# Patient Record
Sex: FEMALE | Race: WHITE | Employment: OTHER | URBAN - METROPOLITAN AREA
[De-identification: names, ages, dates, MRNs, and addresses within clinical notes are randomized per-mention and may not be internally consistent; named-entity substitution may affect disease eponyms.]

---

## 2019-09-10 ENCOUNTER — HOSPITAL ENCOUNTER (EMERGENCY)
Age: 64
Discharge: HOME | End: 2019-09-10
Attending: INTERNAL MEDICINE
Payer: MEDICARE

## 2019-09-10 ENCOUNTER — APPOINTMENT (OUTPATIENT)
Dept: GENERAL RADIOLOGY | Age: 64
End: 2019-09-10
Payer: MEDICARE

## 2019-09-10 VITALS
WEIGHT: 200 LBS | DIASTOLIC BLOOD PRESSURE: 58 MMHG | TEMPERATURE: 97.8 F | HEART RATE: 81 BPM | SYSTOLIC BLOOD PRESSURE: 126 MMHG | RESPIRATION RATE: 17 BRPM | HEIGHT: 62 IN | OXYGEN SATURATION: 100 % | BODY MASS INDEX: 36.8 KG/M2

## 2019-09-10 DIAGNOSIS — T45.1X5A CHEMOTHERAPY-INDUCED NEUTROPENIA (HCC): ICD-10-CM

## 2019-09-10 DIAGNOSIS — E16.2 HYPOGLYCEMIA: Primary | ICD-10-CM

## 2019-09-10 DIAGNOSIS — D70.1 CHEMOTHERAPY-INDUCED NEUTROPENIA (HCC): ICD-10-CM

## 2019-09-10 LAB
ANION GAP SERPL CALCULATED.3IONS-SCNC: 18 MEQ/L (ref 9–15)
ANISOCYTOSIS: PRESENT
BASOPHILS ABSOLUTE: 0 K/UL (ref 0–0.2)
BASOPHILS RELATIVE PERCENT: 0 %
BUN BLDV-MCNC: 16 MG/DL (ref 8–23)
CALCIUM SERPL-MCNC: 8.8 MG/DL (ref 8.5–9.9)
CHLORIDE BLD-SCNC: 100 MEQ/L (ref 95–107)
CHP ED QC CHECK: YES
CO2: 20 MEQ/L (ref 20–31)
CREAT SERPL-MCNC: 0.96 MG/DL (ref 0.5–0.9)
EKG ATRIAL RATE: 81 BPM
EKG P AXIS: 41 DEGREES
EKG P-R INTERVAL: 162 MS
EKG Q-T INTERVAL: 416 MS
EKG QRS DURATION: 82 MS
EKG QTC CALCULATION (BAZETT): 483 MS
EKG R AXIS: -38 DEGREES
EKG T AXIS: 29 DEGREES
EKG VENTRICULAR RATE: 81 BPM
EOSINOPHILS ABSOLUTE: 0 K/UL (ref 0–0.7)
EOSINOPHILS RELATIVE PERCENT: 2.2 %
GFR AFRICAN AMERICAN: >60
GFR NON-AFRICAN AMERICAN: 58.7
GLUCOSE BLD-MCNC: 112 MG/DL (ref 60–115)
GLUCOSE BLD-MCNC: 116 MG/DL (ref 60–115)
GLUCOSE BLD-MCNC: 160 MG/DL (ref 60–115)
GLUCOSE BLD-MCNC: 194 MG/DL
GLUCOSE BLD-MCNC: 194 MG/DL (ref 60–115)
GLUCOSE BLD-MCNC: 86 MG/DL (ref 70–99)
HCT VFR BLD CALC: 30.9 % (ref 37–47)
HEMOGLOBIN: 10.2 G/DL (ref 12–16)
LYMPHOCYTES ABSOLUTE: 0.7 K/UL (ref 1–4.8)
LYMPHOCYTES RELATIVE PERCENT: 32.2 %
MCH RBC QN AUTO: 28 PG (ref 27–31.3)
MCHC RBC AUTO-ENTMCNC: 33 % (ref 33–37)
MCV RBC AUTO: 84.7 FL (ref 82–100)
MONOCYTES ABSOLUTE: 0.6 K/UL (ref 0.2–0.8)
MONOCYTES RELATIVE PERCENT: 25.7 %
NEUTROPHILS ABSOLUTE: 0.9 K/UL (ref 1.4–6.5)
NEUTROPHILS RELATIVE PERCENT: 39.9 %
PDW BLD-RTO: 22.3 % (ref 11.5–14.5)
PERFORMED ON: ABNORMAL
PERFORMED ON: NORMAL
PLATELET # BLD: 178 K/UL (ref 130–400)
POTASSIUM SERPL-SCNC: 3.8 MEQ/L (ref 3.4–4.9)
RBC # BLD: 3.65 M/UL (ref 4.2–5.4)
SODIUM BLD-SCNC: 138 MEQ/L (ref 135–144)
WBC # BLD: 2.3 K/UL (ref 4.8–10.8)

## 2019-09-10 PROCEDURE — 96360 HYDRATION IV INFUSION INIT: CPT

## 2019-09-10 PROCEDURE — 93005 ELECTROCARDIOGRAM TRACING: CPT

## 2019-09-10 PROCEDURE — 36415 COLL VENOUS BLD VENIPUNCTURE: CPT

## 2019-09-10 PROCEDURE — 80048 BASIC METABOLIC PNL TOTAL CA: CPT

## 2019-09-10 PROCEDURE — 85025 COMPLETE CBC W/AUTO DIFF WBC: CPT

## 2019-09-10 PROCEDURE — 2580000003 HC RX 258: Performed by: INTERNAL MEDICINE

## 2019-09-10 PROCEDURE — 2580000003 HC RX 258: Performed by: EMERGENCY MEDICINE

## 2019-09-10 PROCEDURE — 99285 EMERGENCY DEPT VISIT HI MDM: CPT

## 2019-09-10 RX ORDER — 0.9 % SODIUM CHLORIDE 0.9 %
1000 INTRAVENOUS SOLUTION INTRAVENOUS ONCE
Status: COMPLETED | OUTPATIENT
Start: 2019-09-10 | End: 2019-09-10

## 2019-09-10 RX ORDER — GABAPENTIN 800 MG/1
800 TABLET ORAL 2 TIMES DAILY
COMMUNITY

## 2019-09-10 RX ORDER — DIAZEPAM 10 MG/1
10 TABLET ORAL EVERY 8 HOURS PRN
COMMUNITY

## 2019-09-10 RX ORDER — OMEPRAZOLE 20 MG/1
20 CAPSULE, DELAYED RELEASE ORAL DAILY
COMMUNITY

## 2019-09-10 RX ORDER — 0.9 % SODIUM CHLORIDE 0.9 %
500 INTRAVENOUS SOLUTION INTRAVENOUS ONCE
Status: COMPLETED | OUTPATIENT
Start: 2019-09-10 | End: 2019-09-10

## 2019-09-10 RX ADMIN — SODIUM CHLORIDE 500 ML: 9 INJECTION, SOLUTION INTRAVENOUS at 20:22

## 2019-09-10 RX ADMIN — SODIUM CHLORIDE 1000 ML: 9 INJECTION, SOLUTION INTRAVENOUS at 20:21

## 2019-09-10 SDOH — HEALTH STABILITY: MENTAL HEALTH: HOW OFTEN DO YOU HAVE A DRINK CONTAINING ALCOHOL?: NEVER

## 2019-09-10 NOTE — ED TRIAGE NOTES
Patient was camping and became unresponsive. Patient was found thrashing around in brush. Upon EMS arrival, gluc was 50. One amp D50 was given and patient became more alert, blood sugar up to 130. Gluc 112 upon arrival. Patient currently on chemo for lung ca, last treatment was 3 weeks ago. Patient is taking eliquis daily. A/0 x3, resps even and unlabored on room air. Answering questions appropriately. Reports right knee pain.

## 2019-09-10 NOTE — ED NOTES
Bed: 08  Expected date:   Expected time:   Means of arrival:   Comments:  REJI   63 YO F bill Wooten RN  09/10/19 [id number]

## 2019-09-11 PROCEDURE — 93010 ELECTROCARDIOGRAM REPORT: CPT | Performed by: INTERNAL MEDICINE

## 2019-09-11 NOTE — ED NOTES
Patient refuses right knee xray at this time. Patient states \"It's not necessary\".       Marti Ren RN  09/10/19 2126

## 2019-09-12 NOTE — ED PROVIDER NOTES
[facility] ED  EMERGENCY DEPARTMENT ENCOUNTER      Pt Name: Neno Ceja  MRN: [id number]  [place] 1955  Date of evaluation: 9/10/2019  Provider: Jenny Fernandez MD    CHIEF COMPLAINT       Chief Complaint   Patient presents with    Blood Sugar Problem         HISTORY OF PRESENT ILLNESS   (Location/Symptom, Timing/Onset, Context/Setting, Quality, Duration, Modifying Factors, Severity)  Note limiting factors. Neno Ceja is a 62 y.o. female who has a history of lung cancer undergoing chemo therapy, presents to the emergency department for evaluation and management of unresponsiveness and hypoglycemic episode while camping at [facility]. She was found thrashing in the brush. Her blood sugar dropped to 50. When EMS arrived they gave her D50. She stated that earlier today she had a cheeseburger and 2 coffees. Currently her blood sugar is 112. She sees an oncologist at the [facility] by the name of Dr. Rodo Finley. Her last chemotherapy treatment was 3 weeks ago. REVIEW OF SYSTEMS    Constitutional: Negative for fatigue and fever. Respiratory: Negative for cough, chest tightness and shortness of breath. Cardiovascular: Negative for chest pain, palpitations and leg swelling. Gastrointestinal: Negative for abdominal distention, abdominal pain, diarrhea, nausea and vomiting. Endocrine: Positive for hypoglycemia, negative for cold intolerance, heat intolerance, polydipsia, polyphagia and polyuria. Genitourinary: Negative for difficulty urinating, dysuria, hematuria and urgency. Musculoskeletal: Negative for arthralgias, back pain and neck pain. Skin: Negative for color change, pallor, rash and wound. Neurological: Positive for weakness, mental status change, negative for dizziness, speech difficulty, numbness and headaches. All other systems reviewed and are negative.     Except as noted above theremainder of the review of systems was reviewed and
Yosef Mckeon MD           Summation    Patient care transferred to Dr. Bowen Rose. New Prescriptions from this visit:    Discharge Medication List as of 9/10/2019  8:18 PM          Follow-up:  Usha Drummond, [place] [id number]    In 2 days      See your oncologist tomorrow the next day               (Please note that portions of this note werecompleted with a voice recognition program.  Efforts were made to edit the dictations but occasionally words are mis-transcribed.)    FINAL IMPRESSION      1. Hypoglycemia Stable   2.  Chemotherapy-induced neutropenia [facility])          DISPOSITION/PLAN   DISPOSITION Decision To Discharge 09/10/2019 08:17:21 PM      PATIENT REFERRED TO:  Usha Drummond MD  [address] [place] [address] [phone]    In 2 days      See your oncologist tomorrow the next day          Yosef Mckeon MD (electronically signed)  Attending Emergency Physician      ** Mary Lou Villegas MD  09/12/19 130 Tamy Carpio MD  09/12/19 0408       Yosef Mckeon MD  09/12/19 130 Tamy Carpio MD  09/12/19 0410       Yosef Mckeon MD  09/12/19 0553       Yosef Mckeon MD  09/12/19 1069       Yosef Mckeon MD  09/12/19 1936       Yosef Mckeon MD  09/12/19 130 Tamy Carpio MD  09/12/19 9256

## 2025-02-21 ENCOUNTER — APPOINTMENT (OUTPATIENT)
Dept: RADIOLOGY | Facility: HOSPITAL | Age: 70
End: 2025-02-21
Payer: MEDICARE

## 2025-02-21 ENCOUNTER — APPOINTMENT (OUTPATIENT)
Dept: CARDIOLOGY | Facility: HOSPITAL | Age: 70
End: 2025-02-21
Payer: MEDICARE

## 2025-02-21 ENCOUNTER — HOSPITAL ENCOUNTER (INPATIENT)
Facility: HOSPITAL | Age: 70
End: 2025-02-21
Attending: EMERGENCY MEDICINE | Admitting: INTERNAL MEDICINE
Payer: MEDICARE

## 2025-02-21 DIAGNOSIS — R41.82 ALTERED MENTAL STATUS, UNSPECIFIED ALTERED MENTAL STATUS TYPE: ICD-10-CM

## 2025-02-21 DIAGNOSIS — R65.20 SEPSIS WITH ENCEPHALOPATHY WITHOUT SEPTIC SHOCK, DUE TO UNSPECIFIED ORGANISM (MULTI): Primary | ICD-10-CM

## 2025-02-21 DIAGNOSIS — A41.9 SEPSIS WITH ENCEPHALOPATHY WITHOUT SEPTIC SHOCK, DUE TO UNSPECIFIED ORGANISM (MULTI): Primary | ICD-10-CM

## 2025-02-21 DIAGNOSIS — N30.00 ACUTE CYSTITIS WITHOUT HEMATURIA: ICD-10-CM

## 2025-02-21 DIAGNOSIS — I72.8 ANEURYSM OF OTHER SPECIFIED ARTERIES: ICD-10-CM

## 2025-02-21 DIAGNOSIS — G93.41 SEPSIS WITH ENCEPHALOPATHY WITHOUT SEPTIC SHOCK, DUE TO UNSPECIFIED ORGANISM (MULTI): Primary | ICD-10-CM

## 2025-02-21 DIAGNOSIS — R09.89 OTHER SPECIFIED SYMPTOMS AND SIGNS INVOLVING THE CIRCULATORY AND RESPIRATORY SYSTEMS: ICD-10-CM

## 2025-02-21 DIAGNOSIS — I48.91 ATRIAL FIBRILLATION, UNSPECIFIED TYPE (MULTI): ICD-10-CM

## 2025-02-21 DIAGNOSIS — Z51.5 HOSPICE CARE: ICD-10-CM

## 2025-02-21 DIAGNOSIS — I47.19 ATRIAL TACHYCARDIA (CMS-HCC): ICD-10-CM

## 2025-02-21 PROBLEM — J10.1 INFLUENZA A: Status: ACTIVE | Noted: 2025-02-21

## 2025-02-21 PROBLEM — I50.30 (HFPEF) HEART FAILURE WITH PRESERVED EJECTION FRACTION: Status: ACTIVE | Noted: 2025-02-21

## 2025-02-21 PROBLEM — I27.20 PULMONARY HTN (MULTI): Status: ACTIVE | Noted: 2025-02-21

## 2025-02-21 PROBLEM — R79.89 ELEVATED TROPONIN I LEVEL: Status: ACTIVE | Noted: 2025-02-21

## 2025-02-21 PROBLEM — I35.0 AS (AORTIC STENOSIS): Status: ACTIVE | Noted: 2025-02-21

## 2025-02-21 PROBLEM — J96.01 ACUTE HYPOXIC RESPIRATORY FAILURE (MULTI): Status: ACTIVE | Noted: 2025-02-21

## 2025-02-21 PROBLEM — Z87.09 HISTORY OF COPD: Status: ACTIVE | Noted: 2025-02-21

## 2025-02-21 PROBLEM — N39.0 UTI (URINARY TRACT INFECTION): Status: ACTIVE | Noted: 2025-02-21

## 2025-02-21 LAB
ALBUMIN SERPL BCP-MCNC: 3.5 G/DL (ref 3.4–5)
ALP SERPL-CCNC: 97 U/L (ref 33–136)
ALT SERPL W P-5'-P-CCNC: 16 U/L (ref 7–45)
ANION GAP BLDV CALCULATED.4IONS-SCNC: 6 MMOL/L (ref 10–25)
ANION GAP BLDV CALCULATED.4IONS-SCNC: 6 MMOL/L (ref 10–25)
ANION GAP SERPL CALC-SCNC: 15 MMOL/L (ref 10–20)
APPEARANCE UR: ABNORMAL
AST SERPL W P-5'-P-CCNC: 17 U/L (ref 9–39)
BACTERIA #/AREA URNS AUTO: ABNORMAL /HPF
BASE EXCESS BLDV CALC-SCNC: 5.5 MMOL/L (ref -2–3)
BASE EXCESS BLDV CALC-SCNC: 7.4 MMOL/L (ref -2–3)
BASOPHILS # BLD AUTO: 0.04 X10*3/UL (ref 0–0.1)
BASOPHILS NFR BLD AUTO: 0.2 %
BILIRUB SERPL-MCNC: 0.8 MG/DL (ref 0–1.2)
BILIRUB UR STRIP.AUTO-MCNC: NEGATIVE MG/DL
BNP SERPL-MCNC: 337 PG/ML (ref 0–99)
BODY TEMPERATURE: ABNORMAL
BODY TEMPERATURE: ABNORMAL
BUN SERPL-MCNC: 27 MG/DL (ref 6–23)
CA-I BLDV-SCNC: 1.38 MMOL/L (ref 1.1–1.33)
CA-I BLDV-SCNC: 1.43 MMOL/L (ref 1.1–1.33)
CALCIUM SERPL-MCNC: 10.8 MG/DL (ref 8.6–10.3)
CARDIAC TROPONIN I PNL SERPL HS: 43 NG/L (ref 0–13)
CARDIAC TROPONIN I PNL SERPL HS: 43 NG/L (ref 0–13)
CARDIAC TROPONIN I PNL SERPL HS: 46 NG/L (ref 0–13)
CHLORIDE BLDV-SCNC: 102 MMOL/L (ref 98–107)
CHLORIDE BLDV-SCNC: 103 MMOL/L (ref 98–107)
CHLORIDE SERPL-SCNC: 98 MMOL/L (ref 98–107)
CO2 SERPL-SCNC: 27 MMOL/L (ref 21–32)
COLOR UR: ABNORMAL
CREAT SERPL-MCNC: 1.36 MG/DL (ref 0.5–1.05)
EGFRCR SERPLBLD CKD-EPI 2021: 43 ML/MIN/1.73M*2
EOSINOPHIL # BLD AUTO: 0 X10*3/UL (ref 0–0.7)
EOSINOPHIL NFR BLD AUTO: 0 %
ERYTHROCYTE [DISTWIDTH] IN BLOOD BY AUTOMATED COUNT: 18.2 % (ref 11.5–14.5)
FLUAV RNA RESP QL NAA+PROBE: DETECTED
FLUBV RNA RESP QL NAA+PROBE: NOT DETECTED
GLUCOSE BLD MANUAL STRIP-MCNC: 288 MG/DL (ref 74–99)
GLUCOSE BLD MANUAL STRIP-MCNC: 340 MG/DL (ref 74–99)
GLUCOSE BLDV-MCNC: 318 MG/DL (ref 74–99)
GLUCOSE BLDV-MCNC: 349 MG/DL (ref 74–99)
GLUCOSE SERPL-MCNC: 290 MG/DL (ref 74–99)
GLUCOSE UR STRIP.AUTO-MCNC: NORMAL MG/DL
HCO3 BLDV-SCNC: 29.9 MMOL/L (ref 22–26)
HCO3 BLDV-SCNC: 32 MMOL/L (ref 22–26)
HCT VFR BLD AUTO: 39.5 % (ref 36–46)
HCT VFR BLD EST: 35 % (ref 36–46)
HCT VFR BLD EST: 37 % (ref 36–46)
HGB BLD-MCNC: 12 G/DL (ref 12–16)
HGB BLDV-MCNC: 11.7 G/DL (ref 12–16)
HGB BLDV-MCNC: 12.3 G/DL (ref 12–16)
HOLD SPECIMEN: NORMAL
IMM GRANULOCYTES # BLD AUTO: 0.09 X10*3/UL (ref 0–0.7)
IMM GRANULOCYTES NFR BLD AUTO: 0.5 % (ref 0–0.9)
INHALED O2 CONCENTRATION: 21 %
INHALED O2 CONCENTRATION: 21 %
KETONES UR STRIP.AUTO-MCNC: NEGATIVE MG/DL
LACTATE BLDV-SCNC: 1.3 MMOL/L (ref 0.4–2)
LACTATE BLDV-SCNC: 1.4 MMOL/L (ref 0.4–2)
LACTATE SERPL-SCNC: 1.2 MMOL/L (ref 0.4–2)
LEUKOCYTE ESTERASE UR QL STRIP.AUTO: ABNORMAL
LYMPHOCYTES # BLD AUTO: 0.71 X10*3/UL (ref 1.2–4.8)
LYMPHOCYTES NFR BLD AUTO: 4.2 %
MCH RBC QN AUTO: 26.3 PG (ref 26–34)
MCHC RBC AUTO-ENTMCNC: 30.4 G/DL (ref 32–36)
MCV RBC AUTO: 86 FL (ref 80–100)
MONOCYTES # BLD AUTO: 1.2 X10*3/UL (ref 0.1–1)
MONOCYTES NFR BLD AUTO: 7 %
MUCOUS THREADS #/AREA URNS AUTO: ABNORMAL /LPF
NEUTROPHILS # BLD AUTO: 15 X10*3/UL (ref 1.2–7.7)
NEUTROPHILS NFR BLD AUTO: 88.1 %
NITRITE UR QL STRIP.AUTO: NEGATIVE
NRBC BLD-RTO: 0 /100 WBCS (ref 0–0)
OXYHGB MFR BLDV: 88.6 % (ref 45–75)
OXYHGB MFR BLDV: 93.5 % (ref 45–75)
PCO2 BLDV: 42 MM HG (ref 41–51)
PCO2 BLDV: 44 MM HG (ref 41–51)
PH BLDV: 7.46 PH (ref 7.33–7.43)
PH BLDV: 7.47 PH (ref 7.33–7.43)
PH UR STRIP.AUTO: 6.5 [PH]
PLATELET # BLD AUTO: 225 X10*3/UL (ref 150–450)
PO2 BLDV: 63 MM HG (ref 35–45)
PO2 BLDV: 74 MM HG (ref 35–45)
POTASSIUM BLDV-SCNC: 4.8 MMOL/L (ref 3.5–5.3)
POTASSIUM BLDV-SCNC: 5.3 MMOL/L (ref 3.5–5.3)
POTASSIUM SERPL-SCNC: 4.3 MMOL/L (ref 3.5–5.3)
PROT SERPL-MCNC: 7.4 G/DL (ref 6.4–8.2)
PROT UR STRIP.AUTO-MCNC: ABNORMAL MG/DL
RBC # BLD AUTO: 4.57 X10*6/UL (ref 4–5.2)
RBC # UR STRIP.AUTO: ABNORMAL MG/DL
RBC #/AREA URNS AUTO: >20 /HPF
SAO2 % BLDV: 91 % (ref 45–75)
SAO2 % BLDV: 96 % (ref 45–75)
SARS-COV-2 RNA RESP QL NAA+PROBE: NOT DETECTED
SODIUM BLDV-SCNC: 134 MMOL/L (ref 136–145)
SODIUM BLDV-SCNC: 135 MMOL/L (ref 136–145)
SODIUM SERPL-SCNC: 136 MMOL/L (ref 136–145)
SP GR UR STRIP.AUTO: 1.02
SQUAMOUS #/AREA URNS AUTO: ABNORMAL /HPF
UROBILINOGEN UR STRIP.AUTO-MCNC: ABNORMAL MG/DL
WBC # BLD AUTO: 17 X10*3/UL (ref 4.4–11.3)
WBC #/AREA URNS AUTO: >50 /HPF
WBC CLUMPS #/AREA URNS AUTO: ABNORMAL /HPF
YEAST BUDDING #/AREA UR COMP ASSIST: PRESENT /HPF

## 2025-02-21 PROCEDURE — 84484 ASSAY OF TROPONIN QUANT: CPT | Performed by: EMERGENCY MEDICINE

## 2025-02-21 PROCEDURE — 87899 AGENT NOS ASSAY W/OPTIC: CPT | Mod: STJLAB | Performed by: PHYSICIAN ASSISTANT

## 2025-02-21 PROCEDURE — 87636 SARSCOV2 & INF A&B AMP PRB: CPT | Performed by: NURSE PRACTITIONER

## 2025-02-21 PROCEDURE — 36415 COLL VENOUS BLD VENIPUNCTURE: CPT | Performed by: EMERGENCY MEDICINE

## 2025-02-21 PROCEDURE — 82947 ASSAY GLUCOSE BLOOD QUANT: CPT

## 2025-02-21 PROCEDURE — 81001 URINALYSIS AUTO W/SCOPE: CPT | Performed by: EMERGENCY MEDICINE

## 2025-02-21 PROCEDURE — 83880 ASSAY OF NATRIURETIC PEPTIDE: CPT | Performed by: EMERGENCY MEDICINE

## 2025-02-21 PROCEDURE — 2060000001 HC INTERMEDIATE ICU ROOM DAILY

## 2025-02-21 PROCEDURE — 99291 CRITICAL CARE FIRST HOUR: CPT | Performed by: EMERGENCY MEDICINE

## 2025-02-21 PROCEDURE — 2500000005 HC RX 250 GENERAL PHARMACY W/O HCPCS: Performed by: PHYSICIAN ASSISTANT

## 2025-02-21 PROCEDURE — 96365 THER/PROPH/DIAG IV INF INIT: CPT | Mod: 59

## 2025-02-21 PROCEDURE — 2500000001 HC RX 250 WO HCPCS SELF ADMINISTERED DRUGS (ALT 637 FOR MEDICARE OP): Performed by: EMERGENCY MEDICINE

## 2025-02-21 PROCEDURE — 36415 COLL VENOUS BLD VENIPUNCTURE: CPT | Performed by: PHYSICIAN ASSISTANT

## 2025-02-21 PROCEDURE — 87086 URINE CULTURE/COLONY COUNT: CPT | Mod: STJLAB | Performed by: EMERGENCY MEDICINE

## 2025-02-21 PROCEDURE — 2500000002 HC RX 250 W HCPCS SELF ADMINISTERED DRUGS (ALT 637 FOR MEDICARE OP, ALT 636 FOR OP/ED): Performed by: EMERGENCY MEDICINE

## 2025-02-21 PROCEDURE — 96367 TX/PROPH/DG ADDL SEQ IV INF: CPT

## 2025-02-21 PROCEDURE — 84132 ASSAY OF SERUM POTASSIUM: CPT | Performed by: EMERGENCY MEDICINE

## 2025-02-21 PROCEDURE — 99285 EMERGENCY DEPT VISIT HI MDM: CPT | Mod: 25 | Performed by: EMERGENCY MEDICINE

## 2025-02-21 PROCEDURE — 83605 ASSAY OF LACTIC ACID: CPT | Performed by: EMERGENCY MEDICINE

## 2025-02-21 PROCEDURE — 99222 1ST HOSP IP/OBS MODERATE 55: CPT | Performed by: PHYSICIAN ASSISTANT

## 2025-02-21 PROCEDURE — 71045 X-RAY EXAM CHEST 1 VIEW: CPT

## 2025-02-21 PROCEDURE — 70450 CT HEAD/BRAIN W/O DYE: CPT | Performed by: STUDENT IN AN ORGANIZED HEALTH CARE EDUCATION/TRAINING PROGRAM

## 2025-02-21 PROCEDURE — 87449 NOS EACH ORGANISM AG IA: CPT | Mod: STJLAB | Performed by: PHYSICIAN ASSISTANT

## 2025-02-21 PROCEDURE — 84145 PROCALCITONIN (PCT): CPT | Mod: STJLAB | Performed by: PHYSICIAN ASSISTANT

## 2025-02-21 PROCEDURE — 96360 HYDRATION IV INFUSION INIT: CPT

## 2025-02-21 PROCEDURE — 96361 HYDRATE IV INFUSION ADD-ON: CPT

## 2025-02-21 PROCEDURE — 71045 X-RAY EXAM CHEST 1 VIEW: CPT | Performed by: RADIOLOGY

## 2025-02-21 PROCEDURE — 93005 ELECTROCARDIOGRAM TRACING: CPT

## 2025-02-21 PROCEDURE — 94640 AIRWAY INHALATION TREATMENT: CPT

## 2025-02-21 PROCEDURE — 84484 ASSAY OF TROPONIN QUANT: CPT | Performed by: PHYSICIAN ASSISTANT

## 2025-02-21 PROCEDURE — 84295 ASSAY OF SERUM SODIUM: CPT | Performed by: EMERGENCY MEDICINE

## 2025-02-21 PROCEDURE — 2500000004 HC RX 250 GENERAL PHARMACY W/ HCPCS (ALT 636 FOR OP/ED): Performed by: PHYSICIAN ASSISTANT

## 2025-02-21 PROCEDURE — 70450 CT HEAD/BRAIN W/O DYE: CPT

## 2025-02-21 PROCEDURE — 85025 COMPLETE CBC W/AUTO DIFF WBC: CPT | Performed by: EMERGENCY MEDICINE

## 2025-02-21 PROCEDURE — 87040 BLOOD CULTURE FOR BACTERIA: CPT | Mod: STJLAB | Performed by: EMERGENCY MEDICINE

## 2025-02-21 PROCEDURE — 2500000002 HC RX 250 W HCPCS SELF ADMINISTERED DRUGS (ALT 637 FOR MEDICARE OP, ALT 636 FOR OP/ED): Performed by: PHYSICIAN ASSISTANT

## 2025-02-21 PROCEDURE — 2500000005 HC RX 250 GENERAL PHARMACY W/O HCPCS: Performed by: EMERGENCY MEDICINE

## 2025-02-21 PROCEDURE — 2500000004 HC RX 250 GENERAL PHARMACY W/ HCPCS (ALT 636 FOR OP/ED): Performed by: EMERGENCY MEDICINE

## 2025-02-21 RX ORDER — ALUMINUM HYDROXIDE, MAGNESIUM HYDROXIDE, AND SIMETHICONE 1200; 120; 1200 MG/30ML; MG/30ML; MG/30ML
30 SUSPENSION ORAL EVERY 6 HOURS PRN
Status: DISCONTINUED | OUTPATIENT
Start: 2025-02-21 | End: 2025-02-27

## 2025-02-21 RX ORDER — ACETAMINOPHEN 325 MG/1
650 TABLET ORAL EVERY 4 HOURS PRN
Status: DISCONTINUED | OUTPATIENT
Start: 2025-02-21 | End: 2025-02-27

## 2025-02-21 RX ORDER — DEXTROMETHORPHAN HYDROBROMIDE, GUAIFENESIN 5; 100 MG/5ML; MG/5ML
650 LIQUID ORAL EVERY 6 HOURS PRN
COMMUNITY
End: 2025-02-27 | Stop reason: HOSPADM

## 2025-02-21 RX ORDER — NYSTATIN 100000 [USP'U]/ML
5 SUSPENSION ORAL 4 TIMES DAILY
Status: DISCONTINUED | OUTPATIENT
Start: 2025-02-21 | End: 2025-02-27 | Stop reason: HOSPADM

## 2025-02-21 RX ORDER — POTASSIUM CHLORIDE 750 MG/1
10 TABLET, FILM COATED, EXTENDED RELEASE ORAL DAILY
Status: DISCONTINUED | OUTPATIENT
Start: 2025-02-22 | End: 2025-02-27

## 2025-02-21 RX ORDER — BISACODYL 10 MG/1
10 SUPPOSITORY RECTAL DAILY PRN
COMMUNITY

## 2025-02-21 RX ORDER — INSULIN LISPRO 100 [IU]/ML
0-10 INJECTION, SOLUTION INTRAVENOUS; SUBCUTANEOUS
Status: DISCONTINUED | OUTPATIENT
Start: 2025-02-21 | End: 2025-02-21

## 2025-02-21 RX ORDER — BISACODYL 10 MG/1
10 SUPPOSITORY RECTAL DAILY PRN
Status: DISCONTINUED | OUTPATIENT
Start: 2025-02-21 | End: 2025-02-27 | Stop reason: HOSPADM

## 2025-02-21 RX ORDER — IPRATROPIUM BROMIDE AND ALBUTEROL SULFATE 2.5; .5 MG/3ML; MG/3ML
3 SOLUTION RESPIRATORY (INHALATION)
Status: DISCONTINUED | OUTPATIENT
Start: 2025-02-22 | End: 2025-02-27

## 2025-02-21 RX ORDER — ACETAMINOPHEN 650 MG/1
650 SUPPOSITORY RECTAL EVERY 4 HOURS PRN
Status: DISCONTINUED | OUTPATIENT
Start: 2025-02-21 | End: 2025-02-27

## 2025-02-21 RX ORDER — IPRATROPIUM BROMIDE AND ALBUTEROL SULFATE 2.5; .5 MG/3ML; MG/3ML
3 SOLUTION RESPIRATORY (INHALATION)
Status: DISCONTINUED | OUTPATIENT
Start: 2025-02-21 | End: 2025-02-21

## 2025-02-21 RX ORDER — ADHESIVE BANDAGE
30 BANDAGE TOPICAL DAILY PRN
COMMUNITY
End: 2025-02-27 | Stop reason: HOSPADM

## 2025-02-21 RX ORDER — DIAZEPAM 10 MG/1
10 TABLET ORAL EVERY 8 HOURS PRN
COMMUNITY
End: 2025-02-27 | Stop reason: HOSPADM

## 2025-02-21 RX ORDER — DEXTROSE 50 % IN WATER (D50W) INTRAVENOUS SYRINGE
25
Status: DISCONTINUED | OUTPATIENT
Start: 2025-02-21 | End: 2025-02-27

## 2025-02-21 RX ORDER — IPRATROPIUM BROMIDE 42 UG/1
2 SPRAY, METERED NASAL EVERY 6 HOURS PRN
Status: DISCONTINUED | OUTPATIENT
Start: 2025-02-21 | End: 2025-02-27

## 2025-02-21 RX ORDER — NITROGLYCERIN 0.4 MG/1
0.4 TABLET SUBLINGUAL EVERY 5 MIN PRN
COMMUNITY
End: 2025-02-27 | Stop reason: HOSPADM

## 2025-02-21 RX ORDER — ASPIRIN 81 MG/1
81 TABLET ORAL DAILY
Status: DISCONTINUED | OUTPATIENT
Start: 2025-02-22 | End: 2025-02-27

## 2025-02-21 RX ORDER — ADHESIVE BANDAGE
30 BANDAGE TOPICAL DAILY PRN
Status: DISCONTINUED | OUTPATIENT
Start: 2025-02-21 | End: 2025-02-27

## 2025-02-21 RX ORDER — IPRATROPIUM BROMIDE 42 UG/1
2 SPRAY, METERED NASAL EVERY 6 HOURS PRN
COMMUNITY
End: 2025-02-27 | Stop reason: HOSPADM

## 2025-02-21 RX ORDER — ATORVASTATIN CALCIUM 80 MG/1
80 TABLET, FILM COATED ORAL NIGHTLY
COMMUNITY
End: 2025-02-27 | Stop reason: HOSPADM

## 2025-02-21 RX ORDER — LOPERAMIDE HYDROCHLORIDE 2 MG/1
2 CAPSULE ORAL 4 TIMES DAILY PRN
COMMUNITY
End: 2025-02-27 | Stop reason: HOSPADM

## 2025-02-21 RX ORDER — FUROSEMIDE 10 MG/ML
20 INJECTION INTRAMUSCULAR; INTRAVENOUS ONCE
Status: DISCONTINUED | OUTPATIENT
Start: 2025-02-21 | End: 2025-02-21

## 2025-02-21 RX ORDER — ONDANSETRON HYDROCHLORIDE 2 MG/ML
4 INJECTION, SOLUTION INTRAVENOUS EVERY 8 HOURS PRN
Status: DISCONTINUED | OUTPATIENT
Start: 2025-02-21 | End: 2025-02-27

## 2025-02-21 RX ORDER — CEFTRIAXONE 1 G/50ML
1 INJECTION, SOLUTION INTRAVENOUS EVERY 24 HOURS
Status: DISCONTINUED | OUTPATIENT
Start: 2025-02-22 | End: 2025-02-22

## 2025-02-21 RX ORDER — POLYETHYLENE GLYCOL 3350 17 G/17G
17 POWDER, FOR SOLUTION ORAL DAILY PRN
Status: DISCONTINUED | OUTPATIENT
Start: 2025-02-21 | End: 2025-02-27

## 2025-02-21 RX ORDER — OSELTAMIVIR PHOSPHATE 30 MG/1
30 CAPSULE ORAL 2 TIMES DAILY
Status: DISCONTINUED | OUTPATIENT
Start: 2025-02-22 | End: 2025-02-26

## 2025-02-21 RX ORDER — POLYETHYLENE GLYCOL 3350 17 G/17G
17 POWDER, FOR SOLUTION ORAL DAILY PRN
COMMUNITY
End: 2025-02-27 | Stop reason: HOSPADM

## 2025-02-21 RX ORDER — POTASSIUM CHLORIDE 750 MG/1
10 TABLET, FILM COATED, EXTENDED RELEASE ORAL DAILY
COMMUNITY
End: 2025-02-27 | Stop reason: HOSPADM

## 2025-02-21 RX ORDER — BACLOFEN 5 MG/1
5 TABLET ORAL 2 TIMES DAILY
Status: DISCONTINUED | OUTPATIENT
Start: 2025-02-21 | End: 2025-02-26

## 2025-02-21 RX ORDER — ATORVASTATIN CALCIUM 80 MG/1
80 TABLET, FILM COATED ORAL NIGHTLY
Status: DISCONTINUED | OUTPATIENT
Start: 2025-02-21 | End: 2025-02-27

## 2025-02-21 RX ORDER — DIAZEPAM 5 MG/1
10 TABLET ORAL EVERY 8 HOURS PRN
Status: DISCONTINUED | OUTPATIENT
Start: 2025-02-21 | End: 2025-02-27

## 2025-02-21 RX ORDER — BACLOFEN 5 MG/1
5 TABLET ORAL 2 TIMES DAILY
COMMUNITY
End: 2025-02-27 | Stop reason: HOSPADM

## 2025-02-21 RX ORDER — PANTOPRAZOLE SODIUM 40 MG/1
40 TABLET, DELAYED RELEASE ORAL
Status: DISCONTINUED | OUTPATIENT
Start: 2025-02-22 | End: 2025-02-26

## 2025-02-21 RX ORDER — UMECLIDINIUM BROMIDE AND VILANTEROL TRIFENATATE 62.5; 25 UG/1; UG/1
1 POWDER RESPIRATORY (INHALATION) DAILY
COMMUNITY
End: 2025-02-27 | Stop reason: HOSPADM

## 2025-02-21 RX ORDER — DEXTROSE 50 % IN WATER (D50W) INTRAVENOUS SYRINGE
12.5
Status: DISCONTINUED | OUTPATIENT
Start: 2025-02-21 | End: 2025-02-27

## 2025-02-21 RX ORDER — LURASIDONE HYDROCHLORIDE 40 MG/1
120 TABLET, FILM COATED ORAL
Status: DISCONTINUED | OUTPATIENT
Start: 2025-02-22 | End: 2025-02-27

## 2025-02-21 RX ORDER — PANTOPRAZOLE SODIUM 40 MG/1
40 TABLET, DELAYED RELEASE ORAL
COMMUNITY
End: 2025-02-27 | Stop reason: HOSPADM

## 2025-02-21 RX ORDER — ALBUTEROL SULFATE 0.83 MG/ML
2.5 SOLUTION RESPIRATORY (INHALATION) EVERY 2 HOUR PRN
Status: DISCONTINUED | OUTPATIENT
Start: 2025-02-21 | End: 2025-02-27 | Stop reason: HOSPADM

## 2025-02-21 RX ORDER — CHOLECALCIFEROL (VITAMIN D3) 25 MCG
2000 TABLET ORAL DAILY
Status: DISCONTINUED | OUTPATIENT
Start: 2025-02-22 | End: 2025-02-27

## 2025-02-21 RX ORDER — ALUMINUM HYDROXIDE, MAGNESIUM HYDROXIDE, AND SIMETHICONE 1200; 120; 1200 MG/30ML; MG/30ML; MG/30ML
30 SUSPENSION ORAL EVERY 6 HOURS PRN
COMMUNITY
End: 2025-02-27 | Stop reason: HOSPADM

## 2025-02-21 RX ORDER — GABAPENTIN 100 MG/1
100 CAPSULE ORAL 2 TIMES DAILY
Status: DISCONTINUED | OUTPATIENT
Start: 2025-02-21 | End: 2025-02-26

## 2025-02-21 RX ORDER — ONDANSETRON 4 MG/1
4 TABLET, FILM COATED ORAL EVERY 6 HOURS PRN
COMMUNITY

## 2025-02-21 RX ORDER — TALC
3 POWDER (GRAM) TOPICAL NIGHTLY PRN
Status: DISCONTINUED | OUTPATIENT
Start: 2025-02-21 | End: 2025-02-27

## 2025-02-21 RX ORDER — CHOLECALCIFEROL (VITAMIN D3) 50 MCG
50 TABLET ORAL DAILY
COMMUNITY
End: 2025-02-27 | Stop reason: HOSPADM

## 2025-02-21 RX ORDER — ACETAMINOPHEN 650 MG/1
650 SUPPOSITORY RECTAL ONCE
Status: COMPLETED | OUTPATIENT
Start: 2025-02-21 | End: 2025-02-21

## 2025-02-21 RX ORDER — FUROSEMIDE 20 MG/1
20 TABLET ORAL DAILY
COMMUNITY
End: 2025-02-27 | Stop reason: HOSPADM

## 2025-02-21 RX ORDER — LURASIDONE HYDROCHLORIDE 120 MG/1
120 TABLET, FILM COATED ORAL
COMMUNITY
End: 2025-02-27 | Stop reason: HOSPADM

## 2025-02-21 RX ORDER — FUROSEMIDE 20 MG/1
20 TABLET ORAL DAILY
Status: DISCONTINUED | OUTPATIENT
Start: 2025-02-22 | End: 2025-02-27

## 2025-02-21 RX ORDER — INSULIN LISPRO 100 [IU]/ML
0-10 INJECTION, SOLUTION INTRAVENOUS; SUBCUTANEOUS EVERY 4 HOURS
Status: DISCONTINUED | OUTPATIENT
Start: 2025-02-21 | End: 2025-02-27

## 2025-02-21 RX ORDER — SODIUM CHLORIDE 9 MG/ML
75 INJECTION, SOLUTION INTRAVENOUS CONTINUOUS
Status: ACTIVE | OUTPATIENT
Start: 2025-02-21 | End: 2025-02-22

## 2025-02-21 RX ORDER — GABAPENTIN 100 MG/1
100 CAPSULE ORAL 2 TIMES DAILY
COMMUNITY
End: 2025-02-27 | Stop reason: HOSPADM

## 2025-02-21 RX ORDER — GLIMEPIRIDE 4 MG/1
4 TABLET ORAL
COMMUNITY
End: 2025-02-27 | Stop reason: HOSPADM

## 2025-02-21 RX ORDER — PANTOPRAZOLE SODIUM 40 MG/10ML
40 INJECTION, POWDER, LYOPHILIZED, FOR SOLUTION INTRAVENOUS
Status: DISCONTINUED | OUTPATIENT
Start: 2025-02-22 | End: 2025-02-26

## 2025-02-21 RX ORDER — HEPARIN SODIUM 5000 [USP'U]/ML
5000 INJECTION, SOLUTION INTRAVENOUS; SUBCUTANEOUS EVERY 8 HOURS
Status: DISCONTINUED | OUTPATIENT
Start: 2025-02-21 | End: 2025-02-24

## 2025-02-21 RX ORDER — OSELTAMIVIR PHOSPHATE 75 MG/1
75 CAPSULE ORAL ONCE
Status: COMPLETED | OUTPATIENT
Start: 2025-02-21 | End: 2025-02-21

## 2025-02-21 RX ORDER — ALBUTEROL SULFATE 0.83 MG/ML
2.5 SOLUTION RESPIRATORY (INHALATION) EVERY 6 HOURS PRN
COMMUNITY

## 2025-02-21 RX ORDER — ONDANSETRON 4 MG/1
4 TABLET, ORALLY DISINTEGRATING ORAL EVERY 8 HOURS PRN
Status: DISCONTINUED | OUTPATIENT
Start: 2025-02-21 | End: 2025-02-27 | Stop reason: HOSPADM

## 2025-02-21 RX ORDER — ASPIRIN 81 MG/1
81 TABLET ORAL DAILY
COMMUNITY
End: 2025-02-27 | Stop reason: HOSPADM

## 2025-02-21 RX ADMIN — POLYVINYL ALCOHOL, POVIDONE 1 DROP: 14; 6 SOLUTION/ DROPS OPHTHALMIC at 20:43

## 2025-02-21 RX ADMIN — INSULIN LISPRO 6 UNITS: 100 INJECTION, SOLUTION INTRAVENOUS; SUBCUTANEOUS at 20:43

## 2025-02-21 RX ADMIN — Medication 3 L/MIN: at 12:26

## 2025-02-21 RX ADMIN — OSELTAMAVIR PHOSPHATE 75 MG: 75 CAPSULE ORAL at 15:32

## 2025-02-21 RX ADMIN — AZITHROMYCIN MONOHYDRATE 500 MG: 500 INJECTION, POWDER, LYOPHILIZED, FOR SOLUTION INTRAVENOUS at 12:24

## 2025-02-21 RX ADMIN — ACETAMINOPHEN 650 MG: 650 SUPPOSITORY RECTAL at 12:36

## 2025-02-21 RX ADMIN — HEPARIN SODIUM 5000 UNITS: 5000 INJECTION, SOLUTION INTRAVENOUS; SUBCUTANEOUS at 20:44

## 2025-02-21 RX ADMIN — SODIUM CHLORIDE, POTASSIUM CHLORIDE, SODIUM LACTATE AND CALCIUM CHLORIDE 1000 ML: 600; 310; 30; 20 INJECTION, SOLUTION INTRAVENOUS at 11:29

## 2025-02-21 RX ADMIN — Medication 4 L/MIN: at 19:50

## 2025-02-21 RX ADMIN — CEFTRIAXONE 2 G: 2 INJECTION, POWDER, FOR SOLUTION INTRAMUSCULAR; INTRAVENOUS at 11:40

## 2025-02-21 RX ADMIN — IPRATROPIUM BROMIDE AND ALBUTEROL SULFATE 3 ML: 2.5; .5 SOLUTION RESPIRATORY (INHALATION) at 19:48

## 2025-02-21 RX ADMIN — SODIUM CHLORIDE 75 ML/HR: 9 INJECTION, SOLUTION INTRAVENOUS at 20:44

## 2025-02-21 SDOH — SOCIAL STABILITY: SOCIAL INSECURITY: DO YOU FEEL UNSAFE GOING BACK TO THE PLACE WHERE YOU ARE LIVING?: UNABLE TO ASSESS

## 2025-02-21 SDOH — SOCIAL STABILITY: SOCIAL INSECURITY: WITHIN THE LAST YEAR, HAVE YOU BEEN AFRAID OF YOUR PARTNER OR EX-PARTNER?: PATIENT UNABLE TO ANSWER

## 2025-02-21 SDOH — ECONOMIC STABILITY: FOOD INSECURITY
WITHIN THE PAST 12 MONTHS, THE FOOD YOU BOUGHT JUST DIDN'T LAST AND YOU DIDN'T HAVE MONEY TO GET MORE.: PATIENT UNABLE TO ANSWER

## 2025-02-21 SDOH — SOCIAL STABILITY: SOCIAL INSECURITY: HAVE YOU HAD THOUGHTS OF HARMING ANYONE ELSE?: NO

## 2025-02-21 SDOH — SOCIAL STABILITY: SOCIAL INSECURITY: DOES ANYONE TRY TO KEEP YOU FROM HAVING/CONTACTING OTHER FRIENDS OR DOING THINGS OUTSIDE YOUR HOME?: UNABLE TO ASSESS

## 2025-02-21 SDOH — SOCIAL STABILITY: SOCIAL INSECURITY: ARE YOU OR HAVE YOU BEEN THREATENED OR ABUSED PHYSICALLY, EMOTIONALLY, OR SEXUALLY BY ANYONE?: UNABLE TO ASSESS

## 2025-02-21 SDOH — ECONOMIC STABILITY: FOOD INSECURITY
WITHIN THE PAST 12 MONTHS, YOU WORRIED THAT YOUR FOOD WOULD RUN OUT BEFORE YOU GOT THE MONEY TO BUY MORE.: PATIENT UNABLE TO ANSWER

## 2025-02-21 SDOH — ECONOMIC STABILITY: HOUSING INSECURITY: AT ANY TIME IN THE PAST 12 MONTHS, WERE YOU HOMELESS OR LIVING IN A SHELTER (INCLUDING NOW)?: PATIENT UNABLE TO ANSWER

## 2025-02-21 SDOH — SOCIAL STABILITY: SOCIAL INSECURITY: ARE THERE ANY APPARENT SIGNS OF INJURIES/BEHAVIORS THAT COULD BE RELATED TO ABUSE/NEGLECT?: UNABLE TO ASSESS

## 2025-02-21 SDOH — ECONOMIC STABILITY: HOUSING INSECURITY
IN THE LAST 12 MONTHS, WAS THERE A TIME WHEN YOU WERE NOT ABLE TO PAY THE MORTGAGE OR RENT ON TIME?: PATIENT UNABLE TO ANSWER

## 2025-02-21 SDOH — ECONOMIC STABILITY: INCOME INSECURITY
IN THE PAST 12 MONTHS HAS THE ELECTRIC, GAS, OIL, OR WATER COMPANY THREATENED TO SHUT OFF SERVICES IN YOUR HOME?: PATIENT UNABLE TO ANSWER

## 2025-02-21 SDOH — SOCIAL STABILITY: SOCIAL INSECURITY: HAS ANYONE EVER THREATENED TO HURT YOUR FAMILY OR YOUR PETS?: UNABLE TO ASSESS

## 2025-02-21 SDOH — ECONOMIC STABILITY: HOUSING INSECURITY: IN THE PAST 12 MONTHS, HOW MANY TIMES HAVE YOU MOVED WHERE YOU WERE LIVING?: 0

## 2025-02-21 SDOH — SOCIAL STABILITY: SOCIAL INSECURITY
WITHIN THE LAST YEAR, HAVE YOU BEEN HUMILIATED OR EMOTIONALLY ABUSED IN OTHER WAYS BY YOUR PARTNER OR EX-PARTNER?: PATIENT UNABLE TO ANSWER

## 2025-02-21 SDOH — SOCIAL STABILITY: SOCIAL INSECURITY
WITHIN THE LAST YEAR, HAVE YOU BEEN KICKED, HIT, SLAPPED, OR OTHERWISE PHYSICALLY HURT BY YOUR PARTNER OR EX-PARTNER?: PATIENT UNABLE TO ANSWER

## 2025-02-21 SDOH — SOCIAL STABILITY: SOCIAL INSECURITY: HAVE YOU HAD ANY THOUGHTS OF HARMING ANYONE ELSE?: UNABLE TO ASSESS

## 2025-02-21 SDOH — SOCIAL STABILITY: SOCIAL INSECURITY: DO YOU FEEL ANYONE HAS EXPLOITED OR TAKEN ADVANTAGE OF YOU FINANCIALLY OR OF YOUR PERSONAL PROPERTY?: UNABLE TO ASSESS

## 2025-02-21 SDOH — ECONOMIC STABILITY: FOOD INSECURITY
HOW HARD IS IT FOR YOU TO PAY FOR THE VERY BASICS LIKE FOOD, HOUSING, MEDICAL CARE, AND HEATING?: PATIENT UNABLE TO ANSWER

## 2025-02-21 SDOH — SOCIAL STABILITY: SOCIAL INSECURITY: ABUSE: ADULT

## 2025-02-21 SDOH — SOCIAL STABILITY: SOCIAL INSECURITY: WERE YOU ABLE TO COMPLETE ALL THE BEHAVIORAL HEALTH SCREENINGS?: NO

## 2025-02-21 SDOH — ECONOMIC STABILITY: TRANSPORTATION INSECURITY
IN THE PAST 12 MONTHS, HAS LACK OF TRANSPORTATION KEPT YOU FROM MEDICAL APPOINTMENTS OR FROM GETTING MEDICATIONS?: PATIENT UNABLE TO ANSWER

## 2025-02-21 SDOH — SOCIAL STABILITY: SOCIAL INSECURITY
WITHIN THE LAST YEAR, HAVE YOU BEEN RAPED OR FORCED TO HAVE ANY KIND OF SEXUAL ACTIVITY BY YOUR PARTNER OR EX-PARTNER?: PATIENT UNABLE TO ANSWER

## 2025-02-21 ASSESSMENT — ACTIVITIES OF DAILY LIVING (ADL)
HEARING - LEFT EAR: FUNCTIONAL
BATHING: DEPENDENT
ADEQUATE_TO_COMPLETE_ADL: UNABLE TO ASSESS
JUDGMENT_ADEQUATE_SAFELY_COMPLETE_DAILY_ACTIVITIES: UNABLE TO ASSESS
TOILETING: DEPENDENT
LACK_OF_TRANSPORTATION: PATIENT UNABLE TO ANSWER
PATIENT'S MEMORY ADEQUATE TO SAFELY COMPLETE DAILY ACTIVITIES?: UNABLE TO ASSESS
WALKS IN HOME: DEPENDENT
HEARING - RIGHT EAR: FUNCTIONAL
LACK_OF_TRANSPORTATION: PATIENT UNABLE TO ANSWER
DRESSING YOURSELF: DEPENDENT
FEEDING YOURSELF: UNABLE TO ASSESS
GROOMING: DEPENDENT

## 2025-02-21 ASSESSMENT — COLUMBIA-SUICIDE SEVERITY RATING SCALE - C-SSRS
1. IN THE PAST MONTH, HAVE YOU WISHED YOU WERE DEAD OR WISHED YOU COULD GO TO SLEEP AND NOT WAKE UP?: NO
2. HAVE YOU ACTUALLY HAD ANY THOUGHTS OF KILLING YOURSELF?: NO
6. HAVE YOU EVER DONE ANYTHING, STARTED TO DO ANYTHING, OR PREPARED TO DO ANYTHING TO END YOUR LIFE?: NO

## 2025-02-21 ASSESSMENT — PAIN SCALES - GENERAL
PAINLEVEL_OUTOF10: 0 - NO PAIN
PAINLEVEL_OUTOF10: 0 - NO PAIN

## 2025-02-21 ASSESSMENT — PAIN - FUNCTIONAL ASSESSMENT
PAIN_FUNCTIONAL_ASSESSMENT: WONG-BAKER FACES
PAIN_FUNCTIONAL_ASSESSMENT: 0-10
PAIN_FUNCTIONAL_ASSESSMENT: 0-10

## 2025-02-21 ASSESSMENT — PATIENT HEALTH QUESTIONNAIRE - PHQ9
1. LITTLE INTEREST OR PLEASURE IN DOING THINGS: NOT AT ALL
2. FEELING DOWN, DEPRESSED OR HOPELESS: NOT AT ALL
SUM OF ALL RESPONSES TO PHQ9 QUESTIONS 1 & 2: 0

## 2025-02-21 ASSESSMENT — COGNITIVE AND FUNCTIONAL STATUS - GENERAL
HELP NEEDED FOR BATHING: TOTAL
PATIENT BASELINE BEDBOUND: UNABLE TO ASSESS AT THIS TIME
MOVING TO AND FROM BED TO CHAIR: TOTAL
DAILY ACTIVITIY SCORE: 6
MOVING FROM LYING ON BACK TO SITTING ON SIDE OF FLAT BED WITH BEDRAILS: TOTAL
TOILETING: TOTAL
DRESSING REGULAR LOWER BODY CLOTHING: TOTAL
WALKING IN HOSPITAL ROOM: TOTAL
CLIMB 3 TO 5 STEPS WITH RAILING: TOTAL
TURNING FROM BACK TO SIDE WHILE IN FLAT BAD: TOTAL
MOBILITY SCORE: 6
PERSONAL GROOMING: TOTAL
EATING MEALS: TOTAL
STANDING UP FROM CHAIR USING ARMS: TOTAL
DRESSING REGULAR UPPER BODY CLOTHING: TOTAL

## 2025-02-21 ASSESSMENT — LIFESTYLE VARIABLES
HOW OFTEN DO YOU HAVE A DRINK CONTAINING ALCOHOL: NEVER
HOW OFTEN DO YOU HAVE 6 OR MORE DRINKS ON ONE OCCASION: NEVER
AUDIT-C TOTAL SCORE: 0
HOW MANY STANDARD DRINKS CONTAINING ALCOHOL DO YOU HAVE ON A TYPICAL DAY: PATIENT DOES NOT DRINK
AUDIT-C TOTAL SCORE: 0
SKIP TO QUESTIONS 9-10: 1

## 2025-02-21 ASSESSMENT — PAIN SCALES - WONG BAKER: WONGBAKER_NUMERICALRESPONSE: NO HURT

## 2025-02-21 NOTE — CARE PLAN
The patient's goals for the shift include    Problem: Respiratory  Goal: Clear secretions with interventions this shift  Outcome: Progressing  Goal: No signs of respiratory distress (eg. Use of accessory muscles. Peds grunting)  Outcome: Progressing  Goal: Patent airway maintained this shift  Outcome: Progressing  Goal: Tolerate pulmonary toileting this shift  Outcome: Progressing     Problem: Safety - Adult  Goal: Free from fall injury  Outcome: Progressing     Problem: Chronic Conditions and Co-morbidities  Goal: Patient's chronic conditions and co-morbidity symptoms are monitored and maintained or improved  Outcome: Progressing     Problem: Nutrition  Goal: Nutrient intake appropriate for maintaining nutritional needs  Outcome: Progressing       The clinical goals for the shift include patient will remain hemodynamically stable

## 2025-02-21 NOTE — H&P
"History Of Present Illness  Adela Barrett is a 69 y.o. female  presented to [facility] on 2/21/2025 via EMS from [facility] at [facility] with a complaint of acute hypoxia and altered mental status with a recent diagnosis of Influenza A on 2/20/25. The patient's past medical history is significant for multiple ischemic CVAs (10/6/23) with left sided hemiplegia & hemiparesis, mild aortic stenosis, pulmonary HTN, carotid artery disease, intracranial atherosclerosis, cognitive communication deficit, CKD 2, non small cell lung cancer of LLL (Dx 2019, s/p carboplatin/paclitaxel/pembrolizumab and RT), former smoker history,  incidental PE (4/2020), COPD on PRN oxygen (2 liters nasal cannula prn), HFpEF, prolonged QT interval, type II diabetes (Hem A1C 6.4% April 2024), HTN, HLD, Bipolar affective disorder, anxiety with depression, contracture left elbow, oropharyngeal phase dysphagia, spinal stenosis, carpal tunnel syndrome, UTI, pneumonia, difficulty walking, falls, and neuropathy.    During my evaluation, the patient is very lethargic and sleepy. She is arousable but then falls back asleep. She was able to tell me her name and date of birth. When I asked her where she is, she initially said [facility] then said she is at the hospital. Her speech is slowed. When I asked her what brought her to the hospital, she just stared blankly at me and didn't give a verbal response.     The history is obtained from a review of nursing home paperwork, ED report, EMS report, and a review of the EMR. Per the ED provider note, the patient is normally A&O x4 and combative. She is now lethargic. She was found to be satting 80% on room air and placed on 2 liters nasal cannula by nursing home staff. She was diagnosed with Influenza A on 2/20/25.     EMS Report: \"[facility] M72 responded to 69hoF c/c tachycardic. UA pt found laying in bed. PT A&Ox2 to person and place but slow to respond. PT ABC's intact. Pt lungs clear " "bilaterally. PT skin warm, dry, and pink with MSP's intact. PT did not appear to be in any obvious distress. Staff sts pt tested positive for Influenza A yesterday. Staff sts pt is more lethargic today and typically is fighting and cursing at staff. Staff sts pt was in the 80's on her pulse ox this mornign and is not win the 90's on 2LPM. Staff sts pt is also tachycardic and the NP wants her sent out. Pt had no complaints. Pt denied any pain -cp -sob -abd pain -n/v -dizziness -headache -blurred vision. Upon further assessment, pt found to be tachycardic  with all other vitals wnl. ALS care provided as noted. IV attempted twice with both infiltrating when flushed. PT vitals remained stable en route. 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     Anxiety and depression     Bipolar 1 disorder (Multi)     Calculus of gallbladder without cholecystitis     Carotid artery disease (CMS-HCC)     Carpal tunnel syndrome     Cervical spondylosis     CHF (congestive heart failure)     CKD (chronic kidney disease)     CKD (chronic kidney disease), stage II     Contracture of left elbow     COPD (chronic obstructive pulmonary disease) (Multi)     CVA (cerebral vascular accident) (Multi)     Diabetes mellitus (Multi)     Hemiplegia and hemiparesis following cerebral infarction affecting left dominant side (Multi)     History of chemotherapy     History of therapeutic radiation     HLD (hyperlipidemia)     HTN (hypertension)     Intracranial atherosclerosis     Lung cancer (Multi)     Mild aortic stenosis     Neuropathy     Non-small cell lung cancer (Multi)     Left lower lobe dx 2019 got chemo and RT    Osteopenia     PNA (pneumonia)     Prolonged QT interval     Pulmonary air embolism (Multi)     Pulmonary HTN (Multi)     Spinal stenosis     Type II " diabetes mellitus (Multi)     UTI (urinary tract infection)        Surgical History  Past Surgical History:   Procedure Laterality Date    BREAST SURGERY      breast reduction    CARPAL TUNNEL RELEASE Left     CATARACT EXTRACTION      INJECTION      cervical spine    TONSILLECTOMY          Social History  Social History     Tobacco Use    Smoking status: Former     Types: Cigarettes   Vaping Use    Vaping status: Unknown   Substance Use Topics    Alcohol use: Not Currently    Drug use: Never        Family History  Family History   Adopted: Yes   Family history unknown: Yes        Allergies  Bee venom protein (honey bee), Carisoprodol, Diclofenac, Lamotrigine, Metformin, and Venom-wasp    Review of Systems   Unable to perform ROS: Mental status change       Physical Exam  Constitutional:       General: She is not in acute distress.     Appearance: She is overweight. She is ill-appearing. She is not toxic-appearing or diaphoretic.   HENT:      Head: Normocephalic and atraumatic.      Nose: Nose normal.      Mouth/Throat:      Mouth: Mucous membranes are dry.      Comments: Whitish film over roof of mouth and partially covering tongue; Possibly thrush?  Eyes:      General: No scleral icterus.     Comments: Eye lashes have some dried up debris and patient has difficulty opening left eye as the lashes are stuck to each other, able to open eye with using my fingers then patient's eye stayed open as the debris holding the lashes together had been loosened    Cardiovascular:      Rate and Rhythm: Regular rhythm. Tachycardia present.      Heart sounds: No murmur heard.  Pulmonary:      Effort: No respiratory distress.      Breath sounds: No stridor. No wheezing or rhonchi.   Abdominal:      General: There is no distension.      Palpations: Abdomen is soft.      Tenderness: There is no abdominal tenderness.      Comments: + bowel sounds  Abdominal striae    Musculoskeletal:         General: No swelling.      Cervical back:  "Neck supple.      Comments: Moves all extremities x4   Skin:     General: Skin is warm and dry.   Neurological:      General: No focal deficit present.      Mental Status: She is easily aroused. She is lethargic.      Comments: Very lethargic   Alert to person   Initially says [facility] is at [facility] then says she is at the hospital   When asked why she came to the hospital, she stares blankly and doesn't give an answer   Psychiatric:         Attention and Perception: She is inattentive.         Behavior: Behavior is slowed.         Cognition and Memory: Cognition is impaired.      Comments: Lethargic          Last Recorded Vitals  Visit Vitals  /62   Pulse (!) 120   Temp 36.9 °C (98.4 °F) (Temporal)   Resp 20   Ht 1.651 m (5' 5\")   Wt 91 kg (200 lb 9.9 oz)   SpO2 97%   BMI 33.38 kg/m²   Smoking Status Former   BSA 2.04 m²        Relevant Results  Results for orders placed or performed during the hospital encounter of 02/21/25 (from the past 24 hours)   CBC and Auto Differential   Result Value Ref Range    WBC 17.0 (H) 4.4 - 11.3 x10*3/uL    nRBC 0.0 0.0 - 0.0 /100 WBCs    RBC 4.57 4.00 - 5.20 x10*6/uL    Hemoglobin 12.0 12.0 - 16.0 g/dL    Hematocrit 39.5 36.0 - 46.0 %    MCV 86 80 - 100 fL    MCH 26.3 26.0 - 34.0 pg    MCHC 30.4 (L) 32.0 - 36.0 g/dL    RDW 18.2 (H) 11.5 - 14.5 %    Platelets 225 150 - 450 x10*3/uL    Neutrophils % 88.1 40.0 - 80.0 %    Immature Granulocytes %, Automated 0.5 0.0 - 0.9 %    Lymphocytes % 4.2 13.0 - 44.0 %    Monocytes % 7.0 2.0 - 10.0 %    Eosinophils % 0.0 0.0 - 6.0 %    Basophils % 0.2 0.0 - 2.0 %    Neutrophils Absolute 15.00 (H) 1.20 - 7.70 x10*3/uL    Immature Granulocytes Absolute, Automated 0.09 0.00 - 0.70 x10*3/uL    Lymphocytes Absolute 0.71 (L) 1.20 - 4.80 x10*3/uL    Monocytes Absolute 1.20 (H) 0.10 - 1.00 x10*3/uL    Eosinophils Absolute 0.00 0.00 - 0.70 x10*3/uL    Basophils Absolute 0.04 0.00 - 0.10 x10*3/uL   Comprehensive Metabolic Panel   Result Value Ref " Range    Glucose 290 (H) 74 - 99 mg/dL    Sodium 136 136 - 145 mmol/L    Potassium 4.3 3.5 - 5.3 mmol/L    Chloride 98 98 - 107 mmol/L    Bicarbonate 27 21 - 32 mmol/L    Anion Gap 15 10 - 20 mmol/L    Urea Nitrogen 27 (H) 6 - 23 mg/dL    Creatinine 1.36 (H) 0.50 - 1.05 mg/dL    eGFR 43 (L) >60 mL/min/1.73m*2    Calcium 10.8 (H) 8.6 - 10.3 mg/dL    Albumin 3.5 3.4 - 5.0 g/dL    Alkaline Phosphatase 97 33 - 136 U/L    Total Protein 7.4 6.4 - 8.2 g/dL    AST 17 9 - 39 U/L    Bilirubin, Total 0.8 0.0 - 1.2 mg/dL    ALT 16 7 - 45 U/L   Lactate   Result Value Ref Range    Lactate 1.2 0.4 - 2.0 mmol/L   Blood Culture    Specimen: Peripheral Venipuncture; Blood culture   Result Value Ref Range    Blood Culture Loaded on Instrument - Culture in progress    Blood Culture    Specimen: Peripheral Venipuncture; Blood culture   Result Value Ref Range    Blood Culture Loaded on Instrument - Culture in progress    Blood Gas Venous Full Panel   Result Value Ref Range    POCT pH, Venous 7.47 (H) 7.33 - 7.43 pH    POCT pCO2, Venous 44 41 - 51 mm Hg    POCT pO2, Venous 63 (H) 35 - 45 mm Hg    POCT SO2, Venous 91 (H) 45 - 75 %    POCT Oxy Hemoglobin, Venous 88.6 (H) 45.0 - 75.0 %    POCT Hematocrit Calculated, Venous 37.0 36.0 - 46.0 %    POCT Sodium, Venous 135 (L) 136 - 145 mmol/L    POCT Potassium, Venous 5.3 3.5 - 5.3 mmol/L    POCT Chloride, Venous 102 98 - 107 mmol/L    POCT Ionized Calicum, Venous 1.43 (H) 1.10 - 1.33 mmol/L    POCT Glucose, Venous 318 (H) 74 - 99 mg/dL    POCT Lactate, Venous 1.4 0.4 - 2.0 mmol/L    POCT Base Excess, Venous 7.4 (H) -2.0 - 3.0 mmol/L    POCT HCO3 Calculated, Venous 32.0 (H) 22.0 - 26.0 mmol/L    POCT Hemoglobin, Venous 12.3 12.0 - 16.0 g/dL    POCT Anion Gap, Venous 6.0 (L) 10.0 - 25.0 mmol/L    Patient Temperature      FiO2 21 %   Troponin I, High Sensitivity   Result Value Ref Range    Troponin I, High Sensitivity 46 (H) 0 - 13 ng/L   Light Blue Top   Result Value Ref Range    Extra Tube Hold  for add-ons.    B-type natriuretic peptide   Result Value Ref Range     (H) 0 - 99 pg/mL   BLOOD GAS VENOUS FULL PANEL   Result Value Ref Range    POCT pH, Venous 7.46 (H) 7.33 - 7.43 pH    POCT pCO2, Venous 42 41 - 51 mm Hg    POCT pO2, Venous 74 (H) 35 - 45 mm Hg    POCT SO2, Venous 96 (H) 45 - 75 %    POCT Oxy Hemoglobin, Venous 93.5 (H) 45.0 - 75.0 %    POCT Hematocrit Calculated, Venous 35.0 (L) 36.0 - 46.0 %    POCT Sodium, Venous 134 (L) 136 - 145 mmol/L    POCT Potassium, Venous 4.8 3.5 - 5.3 mmol/L    POCT Chloride, Venous 103 98 - 107 mmol/L    POCT Ionized Calicum, Venous 1.38 (H) 1.10 - 1.33 mmol/L    POCT Glucose, Venous 349 (H) 74 - 99 mg/dL    POCT Lactate, Venous 1.3 0.4 - 2.0 mmol/L    POCT Base Excess, Venous 5.5 (H) -2.0 - 3.0 mmol/L    POCT HCO3 Calculated, Venous 29.9 (H) 22.0 - 26.0 mmol/L    POCT Hemoglobin, Venous 11.7 (L) 12.0 - 16.0 g/dL    POCT Anion Gap, Venous 6.0 (L) 10.0 - 25.0 mmol/L    Patient Temperature      FiO2 21 %   Troponin I, High Sensitivity   Result Value Ref Range    Troponin I, High Sensitivity 43 (H) 0 - 13 ng/L   Urinalysis with Reflex Culture and Microscopic   Result Value Ref Range    Color, Urine Orange (N) Light-Yellow, Yellow, Dark-Yellow    Appearance, Urine Ex.Turbid (N) Clear    Specific Gravity, Urine 1.021 1.005 - 1.035    pH, Urine 6.5 5.0, 5.5, 6.0, 6.5, 7.0, 7.5, 8.0    Protein, Urine 200 (2+) (A) NEGATIVE, 10 (TRACE), 20 (TRACE) mg/dL    Glucose, Urine Normal Normal mg/dL    Blood, Urine OVER (3+) (A) NEGATIVE mg/dL    Ketones, Urine NEGATIVE NEGATIVE mg/dL    Bilirubin, Urine NEGATIVE NEGATIVE mg/dL    Urobilinogen, Urine 2 (1+) (A) Normal mg/dL    Nitrite, Urine NEGATIVE NEGATIVE    Leukocyte Esterase, Urine 500 Siddhartha/uL (A) NEGATIVE   Microscopic Only, Urine   Result Value Ref Range    WBC, Urine >50 (A) 1-5, NONE /HPF    WBC Clumps, Urine MANY Reference range not established. /HPF    RBC, Urine >20 (A) NONE, 1-2, 3-5 /HPF    Squamous Epithelial  Cells, Urine 1-9 (SPARSE) Reference range not established. /HPF    Bacteria, Urine 4+ (A) NONE SEEN /HPF    Budding Yeast, Urine PRESENT (A) NONE /HPF    Mucus, Urine 2+ Reference range not established. /LPF   Sars-CoV-2 and Influenza A/B PCR   Result Value Ref Range    Flu A Result Detected (A) Not Detected    Flu B Result Not Detected Not Detected    Coronavirus 2019, PCR Not Detected Not Detected   POCT GLUCOSE   Result Value Ref Range    POCT Glucose 340 (H) 74 - 99 mg/dL   Troponin I, High Sensitivity   Result Value Ref Range    Troponin I, High Sensitivity 43 (H) 0 - 13 ng/L   POCT GLUCOSE   Result Value Ref Range    POCT Glucose 288 (H) 74 - 99 mg/dL      Imaging:  CT head wo IV contrast   Final Result   Bilateral chronic parietal lobe ischemic infarct.        Moderate burden of supratentorial and infratentorial chronic small   vessel ischemic disease.        No acute intracranial abnormality.        MACRO:   None.        Signed by: Al Flores 2/21/2025 6:44 PM   Dictation workstation:   [id number]      XR chest 1 view   Final Result   Patient rotated to the left.        Findings suspicious for mild CHF. Clinical correlation needed.        MACRO:   None        Signed by: Al Best 2/21/2025 12:04 PM   Dictation workstation:   [id number]          EKG:  No results found for this or any previous visit (from the past 4464 hours).  Echo:  No results found for this or any previous visit.    Home Medications  Prior to Admission medications    Medication Sig Start Date End Date Taking? Authorizing Provider   acetaminophen (Tylenol 8 HOUR) 650 mg ER tablet Take 1 tablet (650 mg) by mouth every 6 hours if needed for mild pain (1 - 3).   Yes Historical Provider, MD   albuterol 2.5 mg /3 mL (0.083 %) nebulizer solution Take 3 mL (2.5 mg) by nebulization every 6 hours if needed for shortness of breath or wheezing.   Yes Historical Provider, MD   alum-mag hydroxide-simeth (Mylanta) 200-200-20 mg/5 mL oral  suspension Take 30 mL by mouth every 6 hours if needed for indigestion or heartburn.   Yes Historical Provider, MD   atorvastatin (Lipitor) 80 mg tablet Take 1 tablet (80 mg) by mouth once daily at bedtime.   Yes Historical Provider, MD   baclofen (Lioresal) 5 mg tablet Take 1 tablet (5 mg) by mouth 2 times a day.   Yes Historical Provider, MD   bisacodyl (Dulcolax) 10 mg suppository Insert 1 suppository (10 mg) into the rectum once daily as needed for constipation.   Yes Historical Provider, MD   cholecalciferol (Vitamin D-3) 50 MCG (2000 UT) tablet Take 1 tablet (50 mcg) by mouth once daily.   Yes Historical Provider, MD   diazePAM (Valium) 10 mg tablet Take 1 tablet (10 mg) by mouth every 8 hours if needed for anxiety.   Yes Historical Provider, MD   furosemide (Lasix) 20 mg tablet Take 1 tablet (20 mg) by mouth once daily.   Yes Historical Provider, MD   gabapentin (Neurontin) 100 mg capsule Take 1 capsule (100 mg) by mouth 2 times a day.   Yes Historical Provider, MD   glimepiride (Amaryl) 4 mg tablet Take 1 tablet (4 mg) by mouth once daily in the morning. Take before meals.   Yes Historical Provider, MD   ipratropium (Atrovent) 42 mcg (0.06 %) nasal spray Administer 2 sprays into each nostril every 6 hours if needed for rhinitis.   Yes Historical Provider, MD   loperamide (Imodium) 2 mg capsule Take 1 capsule (2 mg) by mouth 4 times a day as needed for diarrhea.   Yes Historical Provider, MD   lurasidone (Latuda) 120 mg tablet Take 1 tablet (120 mg) by mouth once daily with breakfast.   Yes Historical Provider, MD   magnesium hydroxide (Milk of Magnesia) 400 mg/5 mL suspension Take 30 mL by mouth once daily as needed for constipation.   Yes Historical Provider, MD   nitroglycerin (Nitrostat) 0.4 mg SL tablet Place 1 tablet (0.4 mg) under the tongue every 5 minutes if needed for chest pain.   Yes Historical Provider, MD   ondansetron (Zofran) 4 mg tablet Take 1 tablet (4 mg) by mouth every 6 hours if needed  for nausea or vomiting.   Yes Historical Provider, MD   pantoprazole (ProtoNix) 40 mg EC tablet Take 1 tablet (40 mg) by mouth once daily in the morning. Take before meals.   Yes Historical Provider, MD   polyethylene glycol (Glycolax, Miralax) 17 gram packet Take 17 g by mouth once daily as needed (for constipation).   Yes Historical Provider, MD   potassium chloride CR 10 mEq ER tablet Take 1 tablet (10 mEq) by mouth once daily.   Yes Historical Provider, MD   SITagliptin phosphate (Januvia) 25 mg tablet Take 1 tablet (25 mg) by mouth once daily.   Yes Historical Provider, MD   umeclidinium-vilanteroL (Anoro Ellipta) 62.5-25 mcg/actuation blister with device Inhale 1 puff once daily.   Yes Historical Provider, MD   vortioxetine (Trintellix) 20 mg tablet tablet Take 1 tablet (20 mg) by mouth once daily.   Yes Historical Provider, MD   aspirin 81 mg EC tablet Take 1 tablet (81 mg) by mouth once daily.   Yes Historical Provider, MD       Medications  Scheduled medications  [START ON 2/22/2025] aspirin, 81 mg, oral, Daily  atorvastatin, 80 mg, oral, Nightly  [START ON 2/22/2025] azithromycin, 500 mg, intravenous, q24h  [START ON 2/22/2025] azithromycin, 500 mg, intravenous, q24h  [Held by provider] baclofen, 5 mg, oral, BID  [START ON 2/22/2025] cefTRIAXone, 1 g, intravenous, q24h  [START ON 2/22/2025] cefTRIAXone, 2 g, intravenous, q24h  [START ON 2/22/2025] cholecalciferol, 2,000 Units, oral, Daily  [Held by provider] furosemide, 20 mg, oral, Daily  [Held by provider] gabapentin, 100 mg, oral, BID  heparin (porcine), 5,000 Units, subcutaneous, q8h  insulin lispro, 0-10 Units, subcutaneous, q4h  [START ON 2/22/2025] ipratropium-albuteroL, 3 mL, nebulization, TID  lubricating eye drops, 1 drop, Both Eyes, BID  [START ON 2/22/2025] lurasidone, 120 mg, oral, Daily with breakfast  nystatin, 5 mL, Swish & Swallow, 4x daily  [START ON 2/22/2025] oseltamivir, 30 mg, oral, BID  [START ON 2/22/2025] pantoprazole, 40 mg, oral,  Daily before breakfast   Or  [START ON 2/22/2025] pantoprazole, 40 mg, intravenous, Daily before breakfast  [START ON 2/22/2025] potassium chloride CR, 10 mEq, oral, Daily  [START ON 2/22/2025] vortioxetine, 20 mg, oral, Daily      Continuous medications  sodium chloride 0.9%, 75 mL/hr, Last Rate: 75 mL/hr (02/21/25 2044)      PRN medications  acetaminophen, 650 mg, q4h PRN   Or  acetaminophen, 650 mg, q4h PRN  albuterol, 2.5 mg, q2h PRN  alum-mag hydroxide-simeth, 30 mL, q6h PRN  bisacodyl, 10 mg, Daily PRN  dextrose, 12.5 g, q15 min PRN  dextrose, 25 g, q15 min PRN  [Held by provider] diazePAM, 10 mg, q8h PRN  glucagon, 1 mg, q15 min PRN  glucagon, 1 mg, q15 min PRN  [Held by provider] ipratropium, 2 spray, q6h PRN  magnesium hydroxide, 30 mL, Daily PRN  melatonin, 3 mg, Nightly PRN  ondansetron ODT, 4 mg, q8h PRN   Or  ondansetron, 4 mg, q8h PRN  oxygen, , Continuous PRN - O2/gases  polyethylene glycol, 17 g, Daily PRN        Assessment/Plan   Assessment & Plan  Sepsis with encephalopathy without septic shock, due to unspecified organism (Multi)    UTI (urinary tract infection)    Influenza A    Acute hypoxic respiratory failure (Multi)    History of COPD    (HFpEF) heart failure with preserved ejection fraction    Pulmonary HTN (Multi)    AS (aortic stenosis)    Altered mental status    Elevated troponin I level    Plan:    Consider ID, pulmonology, and cardiology consults  Trend troponin  Tele  Avoid nephrotoxic agents; Lasix held for now  Neurontin, baclofen, and valium held given Altered mental status  Pro-calcitonin   Treat empirically for PNA  Rocephin/azithro for PNA and UTI  Urinary antigens (legionella and strep pneum)  Tamiflu   Respiratory care consult   Bronchodilators   SLP consult; At nursing facility patient is on No added salt/consistent carb diet regular texture thin consistency   Patient failed RN bedside swallow so will keep NPO for now   Nystatin oral swish and swallow ordered for possible  thrush   Dry eye drops   Consider ordering Polymyxin BP and trimethoprim eye drops   PT/OT   Fall, aspiration, skin care precautions  AM labs  BNP elevated at 337        VTE prophylaxis:   DVT prophylaxis: subcutaneous Heparin and SCDs      See additional orders for further plan of care.   Further evaluation and management per attending and consulting physicians.      Code Status  Full code PER NURSING HOME PAPERWORK       I spent a total of 65 minutes on the date of the service in the professional and overall care of this patient.which included preparing to see the patient, face-to-face patient care, completing clinical documentation, and obtaining and/or reviewing separately obtained history.     Cali Ervin PA-C  [facility]  Pager [phone]             *Please note this report has been produced using speech recognition software and may contain errors related to that system including grammar, punctuation and spelling as well as words and phrases that may be inappropriate. If there are questions or concerns, please feel free to contact me to clarify.

## 2025-02-21 NOTE — ED PROVIDER NOTES
EMERGENCY DEPARTMENT ENCOUNTER      Pt Name: Adela Barrett  MRN: [id number]  Birthdate 1955  Date of evaluation: 2/21/2025  Provider: Victor Hugo Antoine DO    CHIEF COMPLAINT       Chief Complaint   Patient presents with    Altered Mental Status     Per EMS, patient is usually combative and A&O X4. Patient now lethargic and not acting like self    Influenza     Patient tested positive for flu A on 02/20/2025    Shortness of Breath     Patient found to be 80% on RA. Placed on 2L by SNF. Not normally on 02.     HISTORY OF PRESENT ILLNESS    Adela Barrett is a 69 y.o. year old female who presents to the ER for altetred mental status. Reportedly has flu. Usually combative, today less responsive. Satting 80's when EMS arrived. History limited by patient's altered mental status    PMH stroke, CKD 3A, lung cancer, DM2, bipolar, CHF, COPD on 2L PRN shortness of breath, HLD, obesity    Allergic to carisoprodol, diclofenac, lamotrigine, metformin     PAST MEDICAL HISTORY     Past Medical History:   Diagnosis Date    Bipolar 1 disorder (Multi)     CHF (congestive heart failure)     CKD (chronic kidney disease)     COPD (chronic obstructive pulmonary disease) (Multi)     CVA (cerebral vascular accident) (Multi)     Diabetes mellitus (Multi)     Lung cancer (Multi)      CURRENT MEDICATIONS       Current Discharge Medication List        CONTINUE these medications which have NOT CHANGED    Details   acetaminophen (Tylenol 8 HOUR) 650 mg ER tablet Take 1 tablet (650 mg) by mouth every 6 hours if needed for mild pain (1 - 3).      albuterol 2.5 mg /3 mL (0.083 %) nebulizer solution Take 3 mL (2.5 mg) by nebulization every 6 hours if needed for shortness of breath or wheezing.      alum-mag hydroxide-simeth (Mylanta) 200-200-20 mg/5 mL oral suspension Take 30 mL by mouth every 6 hours if needed for indigestion or heartburn.      aspirin 81 mg EC tablet Take 1 tablet (81 mg) by mouth once daily.      atorvastatin (Lipitor) 80 mg  tablet Take 1 tablet (80 mg) by mouth once daily at bedtime.      baclofen (Lioresal) 5 mg tablet Take 1 tablet (5 mg) by mouth 2 times a day.      bisacodyl (Dulcolax) 10 mg suppository Insert 1 suppository (10 mg) into the rectum once daily as needed for constipation.      cholecalciferol (Vitamin D-3) 50 MCG (2000 UT) tablet Take 1 tablet (50 mcg) by mouth once daily.      diazePAM (Valium) 10 mg tablet Take 1 tablet (10 mg) by mouth every 8 hours if needed for anxiety.      furosemide (Lasix) 20 mg tablet Take 1 tablet (20 mg) by mouth once daily.      gabapentin (Neurontin) 100 mg capsule Take 1 capsule (100 mg) by mouth 2 times a day.      glimepiride (Amaryl) 4 mg tablet Take 1 tablet (4 mg) by mouth once daily in the morning. Take before meals.      ipratropium (Atrovent) 42 mcg (0.06 %) nasal spray Administer 2 sprays into each nostril every 6 hours if needed for rhinitis.      loperamide (Imodium) 2 mg capsule Take 1 capsule (2 mg) by mouth 4 times a day as needed for diarrhea.      lurasidone (Latuda) 120 mg tablet Take 1 tablet (120 mg) by mouth once daily with breakfast.      magnesium hydroxide (Milk of Magnesia) 400 mg/5 mL suspension Take 30 mL by mouth once daily as needed for constipation.      nitroglycerin (Nitrostat) 0.4 mg SL tablet Place 1 tablet (0.4 mg) under the tongue every 5 minutes if needed for chest pain.      ondansetron (Zofran) 4 mg tablet Take 1 tablet (4 mg) by mouth every 6 hours if needed for nausea or vomiting.      pantoprazole (ProtoNix) 40 mg EC tablet Take 1 tablet (40 mg) by mouth once daily in the morning. Take before meals.      polyethylene glycol (Glycolax, Miralax) 17 gram packet Take 17 g by mouth once daily as needed (for constipation).      potassium chloride CR 10 mEq ER tablet Take 1 tablet (10 mEq) by mouth once daily.      SITagliptin phosphate (Januvia) 25 mg tablet Take 1 tablet (25 mg) by mouth once daily.      umeclidinium-vilanteroL (Anoro Ellipta) 62.5-25  mcg/actuation blister with device Inhale 1 puff once daily.      vortioxetine (Trintellix) 20 mg tablet tablet Take 1 tablet (20 mg) by mouth once daily.           SURGICAL HISTORY       Past Surgical History:   Procedure Laterality Date    CARPAL TUNNEL RELEASE Left     CATARACT EXTRACTION      TONSILLECTOMY       ALLERGIES     Bee venom protein (honey bee), Carisoprodol, Diclofenac, Lamotrigine, and Metformin  FAMILY HISTORY       Family History   Family history unknown: Yes     SOCIAL HISTORY       Social History     Tobacco Use    Smoking status: Former     Types: Cigarettes    Smokeless tobacco: Not on file   Substance Use Topics    Alcohol use: Not Currently    Drug use: Never     PHYSICAL EXAM  (up to 7 for level 4, 8 or more for level 5)     ED Triage Vitals [02/21/25 1110]   Temperature Heart Rate Respirations BP   37.7 °C (99.9 °F) (!) 125 (!) 25 135/70      Pulse Ox Temp Source Heart Rate Source Patient Position   96 % Tympanic Monitor Lying      BP Location FiO2 (%)     Right arm --       Physical Exam  Vitals and nursing note reviewed.   Constitutional:       General: She is in acute distress.      Appearance: Normal appearance. She is obese. She is ill-appearing and toxic-appearing.   HENT:      Head: Normocephalic and atraumatic. No raccoon eyes, Gutierrez's sign, contusion or laceration.      Jaw: No trismus or malocclusion.      Right Ear: External ear normal.      Left Ear: External ear normal.      Mouth/Throat:      Mouth: Mucous membranes are dry.      Pharynx: Oropharynx is clear.   Eyes:      Extraocular Movements: Extraocular movements intact.      Pupils: Pupils are equal, round, and reactive to light.   Neck:      Trachea: No tracheal deviation.   Cardiovascular:      Rate and Rhythm: Regular rhythm. Tachycardia present.      Pulses: Normal pulses.   Pulmonary:      Effort: Tachypnea and accessory muscle usage present.      Breath sounds: Rales (bibasilar, L>R) present. No wheezing or rhonchi.    Chest:      Chest wall: No tenderness.   Abdominal:      General: Abdomen is flat.      Palpations: Abdomen is soft. There is no mass.      Tenderness: There is no abdominal tenderness.   Musculoskeletal:         General: No tenderness or signs of injury.      Right lower leg: No edema.      Left lower leg: No edema.   Skin:     General: Skin is warm.      Capillary Refill: Capillary refill takes less than 2 seconds.      Coloration: Skin is not jaundiced or pale.      Findings: No petechiae, rash or wound.   Neurological:      Mental Status: She is lethargic.      GCS: GCS eye subscore is 3. GCS verbal subscore is 4. GCS motor subscore is 6.      Comments: Oriented to self        DIAGNOSTIC RESULTS   LABS:  Labs Reviewed   CBC WITH AUTO DIFFERENTIAL - Abnormal       Result Value    WBC 17.0 (*)     nRBC 0.0      RBC 4.57      Hemoglobin 12.0      Hematocrit 39.5      MCV 86      MCH 26.3      MCHC 30.4 (*)     RDW 18.2 (*)     Platelets 225      Neutrophils % 88.1      Immature Granulocytes %, Automated 0.5      Lymphocytes % 4.2      Monocytes % 7.0      Eosinophils % 0.0      Basophils % 0.2      Neutrophils Absolute 15.00 (*)     Immature Granulocytes Absolute, Automated 0.09      Lymphocytes Absolute 0.71 (*)     Monocytes Absolute 1.20 (*)     Eosinophils Absolute 0.00      Basophils Absolute 0.04     COMPREHENSIVE METABOLIC PANEL - Abnormal    Glucose 290 (*)     Sodium 136      Potassium 4.3      Chloride 98      Bicarbonate 27      Anion Gap 15      Urea Nitrogen 27 (*)     Creatinine 1.36 (*)     eGFR 43 (*)     Calcium 10.8 (*)     Albumin 3.5      Alkaline Phosphatase 97      Total Protein 7.4      AST 17      Bilirubin, Total 0.8      ALT 16     BLOOD GAS VENOUS FULL PANEL - Abnormal    POCT pH, Venous 7.47 (*)     POCT pCO2, Venous 44      POCT pO2, Venous 63 (*)     POCT SO2, Venous 91 (*)     POCT Oxy Hemoglobin, Venous 88.6 (*)     POCT Hematocrit Calculated, Venous 37.0      POCT Sodium, Venous  135 (*)     POCT Potassium, Venous 5.3      POCT Chloride, Venous 102      POCT Ionized Calicum, Venous 1.43 (*)     POCT Glucose, Venous 318 (*)     POCT Lactate, Venous 1.4      POCT Base Excess, Venous 7.4 (*)     POCT HCO3 Calculated, Venous 32.0 (*)     POCT Hemoglobin, Venous 12.3      POCT Anion Gap, Venous 6.0 (*)     Patient Temperature        FiO2 21     TROPONIN I, HIGH SENSITIVITY - Abnormal    Troponin I, High Sensitivity 46 (*)     Narrative:     Less than 99th percentile of normal range cutoff-  Female and children under 18 years old <14 ng/L; Male <21 ng/L: Negative  Repeat testing should be performed if clinically indicated.     Female and children under 18 years old 14-50 ng/L; Male 21-50 ng/L:  Consistent with possible cardiac damage and possible increased clinical   risk. Serial measurements may help to assess extent of myocardial damage.     >50 ng/L: Consistent with cardiac damage, increased clinical risk and  myocardial infarction. Serial measurements may help assess extent of   myocardial damage.      NOTE: Children less than 1 year old may have higher baseline troponin   levels and results should be interpreted in conjunction with the overall   clinical context.     NOTE: Troponin I testing is performed using a different   testing methodology at [facility] than at other   [facility]. Direct result comparisons should only   be made within the same method.   URINALYSIS WITH REFLEX CULTURE AND MICROSCOPIC - Abnormal    Color, Urine Orange (*)     Appearance, Urine Ex.Turbid (*)     Specific Gravity, Urine 1.021      pH, Urine 6.5      Protein, Urine 200 (2+) (*)     Glucose, Urine Normal      Blood, Urine OVER (3+) (*)     Ketones, Urine NEGATIVE      Bilirubin, Urine NEGATIVE      Urobilinogen, Urine 2 (1+) (*)     Nitrite, Urine NEGATIVE      Leukocyte Esterase, Urine 500 Siddhartha/uL (*)     Narrative:     OVER is reported when the result is greater than the clinically  reportable range.   B-TYPE NATRIURETIC PEPTIDE - Abnormal     (*)     Narrative:        <100 pg/mL - Heart failure unlikely  100-299 pg/mL - Intermediate probability of acute heart                  failure exacerbation. Correlate with clinical                  context and patient history.    >=300 pg/mL - Heart Failure likely. Correlate with clinical                  context and patient history.    BNP testing is performed using different testing methodology at [facility] than at other [facility]. Direct result comparisons should only be made within the same method.      BLOOD GAS VENOUS FULL PANEL - Abnormal    POCT pH, Venous 7.46 (*)     POCT pCO2, Venous 42      POCT pO2, Venous 74 (*)     POCT SO2, Venous 96 (*)     POCT Oxy Hemoglobin, Venous 93.5 (*)     POCT Hematocrit Calculated, Venous 35.0 (*)     POCT Sodium, Venous 134 (*)     POCT Potassium, Venous 4.8      POCT Chloride, Venous 103      POCT Ionized Calicum, Venous 1.38 (*)     POCT Glucose, Venous 349 (*)     POCT Lactate, Venous 1.3      POCT Base Excess, Venous 5.5 (*)     POCT HCO3 Calculated, Venous 29.9 (*)     POCT Hemoglobin, Venous 11.7 (*)     POCT Anion Gap, Venous 6.0 (*)     Patient Temperature        FiO2 21     TROPONIN I, HIGH SENSITIVITY - Abnormal    Troponin I, High Sensitivity 43 (*)     Narrative:     Less than 99th percentile of normal range cutoff-  Female and children under 18 years old <14 ng/L; Male <21 ng/L: Negative  Repeat testing should be performed if clinically indicated.     Female and children under 18 years old 14-50 ng/L; Male 21-50 ng/L:  Consistent with possible cardiac damage and possible increased clinical   risk. Serial measurements may help to assess extent of myocardial damage.     >50 ng/L: Consistent with cardiac damage, increased clinical risk and  myocardial infarction. Serial measurements may help assess extent of   myocardial damage.      NOTE: Children less than 1 year old  may have higher baseline troponin   levels and results should be interpreted in conjunction with the overall   clinical context.     NOTE: Troponin I testing is performed using a different   testing methodology at [facility] than at other   [facility]. Direct result comparisons should only   be made within the same method.   MICROSCOPIC ONLY, URINE - Abnormal    WBC, Urine >50 (*)     WBC Clumps, Urine MANY      RBC, Urine >20 (*)     Squamous Epithelial Cells, Urine 1-9 (SPARSE)      Bacteria, Urine 4+ (*)     Budding Yeast, Urine PRESENT (*)     Mucus, Urine 2+     SARS-COV-2 AND INFLUENZA A/B PCR - Abnormal    Flu A Result Detected (*)     Flu B Result Not Detected      Coronavirus 2019, PCR Not Detected      Narrative:     This assay is an FDA-cleared, in vitro diagnostic nucleic acid amplification test for the qualitative detection and differentiation of SARS CoV-2/ Influenza A/B from nasopharyngeal specimens collected from individuals with signs and symptoms of respiratory tract infections, and has been validated for use at [facility]. Negative results do not preclude COVID-19/ Influenza A/B infections and should not be used as the sole basis for diagnosis, treatment, or other management decisions. Testing for SARS CoV-2 is recommended only for patients who meet current clinical and/or epidemiological criteria defined by federal, state, or local public health directives.   POCT GLUCOSE - Abnormal    POCT Glucose 340 (*)    BLOOD CULTURE - Normal    Blood Culture Loaded on Instrument - Culture in progress     BLOOD CULTURE - Normal    Blood Culture Loaded on Instrument - Culture in progress     LACTATE - Normal    Lactate 1.2      Narrative:     Venipuncture immediately after or during the administration of Metamizole may lead to falsely low results. Testing should be performed immediately prior to Metamizole dosing.   URINE CULTURE   URINALYSIS WITH REFLEX CULTURE  AND MICROSCOPIC    Narrative:     The following orders were created for panel order Urinalysis with Reflex Culture and Microscopic.  Procedure                               Abnormality         Status                     ---------                               -----------         ------                     Urinalysis with Reflex C...[[id number]]  Abnormal            Final result               Extra Urine Gray Tube[[id number]]                            In process                   Please view results for these tests on the individual orders.   EXTRA URINE GRAY TUBE     All other labs were within normal range or not returned as of this dictation.  Imaging  XR chest 1 view   Final Result   Patient rotated to the left.        Findings suspicious for mild CHF. Clinical correlation needed.        MACRO:   None        Signed by: Al Best 2/21/2025 12:04 PM   Dictation workstation:   [id number]      CT head wo IV contrast    (Results Pending)      Procedure  Procedures  EMERGENCY DEPARTMENT COURSE/MDM:   Medical Decision Making    Vitals:    Vitals:    02/21/25 1400 02/21/25 1425 02/21/25 1559 02/21/25 1600   BP:  142/76 115/57 115/57   BP Location:  Left arm Left arm    Patient Position:  Lying Lying    Pulse: (!) 118 (!) 118 106    Resp: (!) 30 (!) 22 24    Temp:  36.7 °C (98.1 °F) 36.2 °C (97.2 °F)    TempSrc:  Tympanic Axillary    SpO2: 96% 94% 94% 93%   Weight:       Height:         Adela Barrett is a female 69 y.o. who presents to the ER for altered mental status. On arrival the patients vital signs were: Afebrile, Tachycardic, Normotensive, Tachypneic, and Normoxic on supplemental oxygen. History obtained from:  EMS .  Given altered mental status, recent flu diagnosis, plan for acetaminophen for antipyresis, altered mental status workup, empiric pneumonia coverage including Tamiflu, septic workup, 1 L LR for rehydration. Patient does appear to be protecting her airway, will obtain VBG to assess for type 2  respiratory failure.     ED Course as of 02/21/25 1805 Fri Feb 21, 2025   1126 CBC and Auto Differential(!)  Leukocytosis, otherwise no acute thrombocytosis, thrombocytopenia, or anemia [CB]   1348 Troponin I, High Sensitivity(!): 43  Not elevated up to 15 points relative to initial troponin, doubt doubt acs or myopericarditis [CB]   1349 XR chest 1 view  IMPRESSION:  Patient rotated to the left.      Findings suspicious for mild CHF. Clinical correlation needed.   [CB]   1349 Urinalysis with Reflex Culture and Microscopic(!)  4+ bacteria, greater than 50 WBCs, 500 leukocyte esterase consistent with UTI.  Will be covered by Rocephin [CB]   1351 On reassessment remains normotensive, tachycardic, tachypneic [CB]   1425 BLOOD GAS VENOUS FULL PANEL(!)  CO2 44->42, stable, BiPAP deferred. Not in respiratory failure at this time.  [CB]   1746 Flu A Result(!): Detected [CB]   1747 Flu B Result: Not Detected [CB]   1747 Coronavirus 2019, PCR: Not Detected [CB]   1803 Comprehensive Metabolic Panel(!)  Elevated glucose and renal function, no acute electrolyte or hepatic abnormality [CB]   1804 BNP(!): 337  Nonspecific elevation in the setting of known heart failure history, less than prior, doubt CHF exacerbation [CB]      ED Course User Index  [CB] Victor Hugo Antoine, DO         Diagnoses as of 02/21/25 1805   Sepsis with encephalopathy without septic shock, due to unspecified organism (Multi)   Acute cystitis without hematuria     External Records Reviewed: I reviewed recent and relevant outside records including inpatient notes, outpatient records    Shared decision making for disposition  Patient and/or patient´s representative was counseled regarding labs, imaging, likely diagnosis. All questions were answered. Recommendation was made   for Admission given the need for further escalation of care to inpatient management. Patient agreed and was admitted in stable condition. Admitting team was notified of any pending labs or  imaging to ensure continuity of care.     ED Medications administered this visit:    Medications   oxygen (O2) therapy (3 L/min inhalation Rate Verify Medical Gas 2/21/25 1226)   azithromycin (Zithromax) 500 mg in dextrose 5%  mL (has no administration in time range)   cefTRIAXone (Rocephin) 1 g in dextrose (iso) IV 50 mL (has no administration in time range)   oseltamivir (Tamiflu) capsule 30 mg (has no administration in time range)   lactated Ringer's bolus 1,000 mL (0 mL intravenous Stopped 2/21/25 1354)   cefTRIAXone (Rocephin) 2 g in sodium chloride 0.9% IV 50 mL (0 g intravenous Stopped 2/21/25 1228)   azithromycin (Zithromax) 500 mg in dextrose 5%  mL (0 mg intravenous Stopped 2/21/25 1354)   oseltamivir (Tamiflu) capsule 75 mg (75 mg oral Given 2/21/25 1532)   acetaminophen (Tylenol) suppository 650 mg (650 mg rectal Given 2/21/25 1236)        Final Impression:   1. Sepsis with encephalopathy without septic shock, due to unspecified organism (Multi)    2. Acute cystitis without hematuria          Please excuse any misspellings or unintended errors related to the Dragon speech recognition software used to dictate this note.    I reviewed the case with the attending ED physician. The attending ED physician agrees with the plan.      Victor Hugo Antoine DO  Resident  02/21/25 6616     errors related to the Dragon speech recognition software used to dictate this note.    I reviewed the case with the attending ED physician. The attending ED physician agrees with the plan.      Victor Hugo Antoine,   Resident  02/21/25 1642    I was present for the entirety of the procedure(s).     The patient was seen by the resident/fellow.  I have personally performed a substantive portion of the encounter.  I have seen and examined the patient; agree with the workup, evaluation, MDM, management and diagnosis.  The care plan has been discussed with the resident; I have reviewed the resident’s note and agree with the documented findings.    69-year-old female presenting to the emergency department.  Patient does have history of dementia, but has been more somnolent than normal.  On arrival she is tachycardic, tachypneic and hypoxic.  She is normotensive.  She is placed on oxygen via nasal cannula.  IV established septic workup was initiated.  We did start broad-spectrum antibiotics.  She was recently diagnosed with influenza.  She is given Tamiflu as well.  Patient would respond to commands and is protecting her airway at the current moment.    She is noted to be influenza A positive as was already reported.  She does have a positive urinalysis, she is already started on broad-spectrum antibiotics.  No evidence of CO2 retention on her VBG.  All other labs reviewed.  She is also noted to have leukocytosis with left shift consistent with her clinical presentation of sepsis.    Patient's sepsis with encephalopathy she will require admission to hospital for further evaluation management.                                              Jose Becker,   02/25/25 2194

## 2025-02-22 ENCOUNTER — APPOINTMENT (OUTPATIENT)
Dept: RADIOLOGY | Facility: HOSPITAL | Age: 70
End: 2025-02-22
Payer: MEDICARE

## 2025-02-22 LAB
ALBUMIN SERPL BCP-MCNC: 3.1 G/DL (ref 3.4–5)
ALP SERPL-CCNC: 73 U/L (ref 33–136)
ALT SERPL W P-5'-P-CCNC: 18 U/L (ref 7–45)
AMMONIA PLAS-SCNC: 20 UMOL/L (ref 16–53)
ANION GAP BLDA CALCULATED.4IONS-SCNC: 8 MMO/L (ref 10–25)
ANION GAP SERPL CALC-SCNC: 14 MMOL/L (ref 10–20)
ANION GAP SERPL CALC-SCNC: 15 MMOL/L (ref 10–20)
AST SERPL W P-5'-P-CCNC: 18 U/L (ref 9–39)
ATRIAL RATE: 127 BPM
BASE EXCESS BLDA CALC-SCNC: 6.4 MMOL/L (ref -2–3)
BASOPHILS # BLD AUTO: 0.03 X10*3/UL (ref 0–0.1)
BASOPHILS NFR BLD AUTO: 0.4 %
BILIRUB SERPL-MCNC: 0.5 MG/DL (ref 0–1.2)
BODY TEMPERATURE: ABNORMAL
BUN SERPL-MCNC: 32 MG/DL (ref 6–23)
BUN SERPL-MCNC: 32 MG/DL (ref 6–23)
CA-I BLDA-SCNC: 1.43 MMOL/L (ref 1.1–1.33)
CALCIUM SERPL-MCNC: 10.3 MG/DL (ref 8.6–10.3)
CALCIUM SERPL-MCNC: 9.9 MG/DL (ref 8.6–10.3)
CARDIAC TROPONIN I PNL SERPL HS: 38 NG/L (ref 0–13)
CARDIAC TROPONIN I PNL SERPL HS: 39 NG/L (ref 0–13)
CHLORIDE BLDA-SCNC: 105 MMOL/L (ref 98–107)
CHLORIDE SERPL-SCNC: 102 MMOL/L (ref 98–107)
CHLORIDE SERPL-SCNC: 105 MMOL/L (ref 98–107)
CO2 SERPL-SCNC: 27 MMOL/L (ref 21–32)
CO2 SERPL-SCNC: 29 MMOL/L (ref 21–32)
CREAT SERPL-MCNC: 1.21 MG/DL (ref 0.5–1.05)
CREAT SERPL-MCNC: 1.24 MG/DL (ref 0.5–1.05)
EGFRCR SERPLBLD CKD-EPI 2021: 48 ML/MIN/1.73M*2
EGFRCR SERPLBLD CKD-EPI 2021: 49 ML/MIN/1.73M*2
EOSINOPHIL # BLD AUTO: 0.01 X10*3/UL (ref 0–0.7)
EOSINOPHIL NFR BLD AUTO: 0.1 %
ERYTHROCYTE [DISTWIDTH] IN BLOOD BY AUTOMATED COUNT: 18.2 % (ref 11.5–14.5)
ERYTHROCYTE [DISTWIDTH] IN BLOOD BY AUTOMATED COUNT: 18.2 % (ref 11.5–14.5)
GLUCOSE BLD MANUAL STRIP-MCNC: 135 MG/DL (ref 74–99)
GLUCOSE BLD MANUAL STRIP-MCNC: 138 MG/DL (ref 74–99)
GLUCOSE BLD MANUAL STRIP-MCNC: 142 MG/DL (ref 74–99)
GLUCOSE BLD MANUAL STRIP-MCNC: 145 MG/DL (ref 74–99)
GLUCOSE BLD MANUAL STRIP-MCNC: 150 MG/DL (ref 74–99)
GLUCOSE BLD MANUAL STRIP-MCNC: 164 MG/DL (ref 74–99)
GLUCOSE BLD MANUAL STRIP-MCNC: 211 MG/DL (ref 74–99)
GLUCOSE BLD MANUAL STRIP-MCNC: 280 MG/DL (ref 74–99)
GLUCOSE BLDA-MCNC: 164 MG/DL (ref 74–99)
GLUCOSE SERPL-MCNC: 153 MG/DL (ref 74–99)
GLUCOSE SERPL-MCNC: 208 MG/DL (ref 74–99)
HCO3 BLDA-SCNC: 31.3 MMOL/L (ref 22–26)
HCT VFR BLD AUTO: 35.2 % (ref 36–46)
HCT VFR BLD AUTO: 38 % (ref 36–46)
HCT VFR BLD EST: 37 % (ref 36–46)
HGB BLD-MCNC: 10.7 G/DL (ref 12–16)
HGB BLD-MCNC: 11.2 G/DL (ref 12–16)
HGB BLDA-MCNC: 12.2 G/DL (ref 12–16)
HOLD SPECIMEN: NORMAL
IMM GRANULOCYTES # BLD AUTO: 0.03 X10*3/UL (ref 0–0.7)
IMM GRANULOCYTES NFR BLD AUTO: 0.4 % (ref 0–0.9)
INHALED O2 CONCENTRATION: 36 %
LACTATE BLDA-SCNC: 0.7 MMOL/L (ref 0.4–2)
LACTATE SERPL-SCNC: 0.8 MMOL/L (ref 0.4–2)
LEGIONELLA AG UR QL: NEGATIVE
LYMPHOCYTES # BLD AUTO: 0.7 X10*3/UL (ref 1.2–4.8)
LYMPHOCYTES NFR BLD AUTO: 8.3 %
MAGNESIUM SERPL-MCNC: 2.13 MG/DL (ref 1.6–2.4)
MCH RBC QN AUTO: 26.4 PG (ref 26–34)
MCH RBC QN AUTO: 26.4 PG (ref 26–34)
MCHC RBC AUTO-ENTMCNC: 29.5 G/DL (ref 32–36)
MCHC RBC AUTO-ENTMCNC: 30.4 G/DL (ref 32–36)
MCV RBC AUTO: 87 FL (ref 80–100)
MCV RBC AUTO: 90 FL (ref 80–100)
MONOCYTES # BLD AUTO: 0.92 X10*3/UL (ref 0.1–1)
MONOCYTES NFR BLD AUTO: 10.9 %
NEUTROPHILS # BLD AUTO: 6.77 X10*3/UL (ref 1.2–7.7)
NEUTROPHILS NFR BLD AUTO: 79.9 %
NRBC BLD-RTO: 0 /100 WBCS (ref 0–0)
NRBC BLD-RTO: 0 /100 WBCS (ref 0–0)
OXYHGB MFR BLDA: 96.6 % (ref 94–98)
P AXIS: 95 DEGREES
P OFFSET: 188 MS
P ONSET: 141 MS
PCO2 BLDA: 45 MM HG (ref 38–42)
PH BLDA: 7.45 PH (ref 7.38–7.42)
PLATELET # BLD AUTO: 175 X10*3/UL (ref 150–450)
PLATELET # BLD AUTO: 188 X10*3/UL (ref 150–450)
PO2 BLDA: 94 MM HG (ref 85–95)
POTASSIUM BLDA-SCNC: 3.6 MMOL/L (ref 3.5–5.3)
POTASSIUM SERPL-SCNC: 3.5 MMOL/L (ref 3.5–5.3)
POTASSIUM SERPL-SCNC: 3.9 MMOL/L (ref 3.5–5.3)
PR INTERVAL: 152 MS
PROCALCITONIN SERPL-MCNC: 1.75 NG/ML
PROT SERPL-MCNC: 6.6 G/DL (ref 6.4–8.2)
Q ONSET: 217 MS
QRS COUNT: 21 BEATS
QRS DURATION: 74 MS
QT INTERVAL: 302 MS
QTC CALCULATION(BAZETT): 438 MS
QTC FREDERICIA: 388 MS
R AXIS: -57 DEGREES
RBC # BLD AUTO: 4.05 X10*6/UL (ref 4–5.2)
RBC # BLD AUTO: 4.24 X10*6/UL (ref 4–5.2)
S PNEUM AG UR QL: NEGATIVE
SAO2 % BLDA: 99 % (ref 94–100)
SODIUM BLDA-SCNC: 141 MMOL/L (ref 136–145)
SODIUM SERPL-SCNC: 139 MMOL/L (ref 136–145)
SODIUM SERPL-SCNC: 145 MMOL/L (ref 136–145)
T AXIS: 96 DEGREES
T OFFSET: 368 MS
TSH SERPL-ACNC: 1.03 MIU/L (ref 0.44–3.98)
VENTRICULAR RATE: 127 BPM
WBC # BLD AUTO: 10.3 X10*3/UL (ref 4.4–11.3)
WBC # BLD AUTO: 8.5 X10*3/UL (ref 4.4–11.3)

## 2025-02-22 PROCEDURE — 70450 CT HEAD/BRAIN W/O DYE: CPT | Performed by: RADIOLOGY

## 2025-02-22 PROCEDURE — 2500000004 HC RX 250 GENERAL PHARMACY W/ HCPCS (ALT 636 FOR OP/ED): Performed by: PHYSICIAN ASSISTANT

## 2025-02-22 PROCEDURE — 94640 AIRWAY INHALATION TREATMENT: CPT

## 2025-02-22 PROCEDURE — 2500000002 HC RX 250 W HCPCS SELF ADMINISTERED DRUGS (ALT 637 FOR MEDICARE OP, ALT 636 FOR OP/ED): Performed by: INTERNAL MEDICINE

## 2025-02-22 PROCEDURE — 80048 BASIC METABOLIC PNL TOTAL CA: CPT | Performed by: PHYSICIAN ASSISTANT

## 2025-02-22 PROCEDURE — 82140 ASSAY OF AMMONIA: CPT

## 2025-02-22 PROCEDURE — 70450 CT HEAD/BRAIN W/O DYE: CPT

## 2025-02-22 PROCEDURE — 36415 COLL VENOUS BLD VENIPUNCTURE: CPT

## 2025-02-22 PROCEDURE — 85027 COMPLETE CBC AUTOMATED: CPT | Performed by: PHYSICIAN ASSISTANT

## 2025-02-22 PROCEDURE — 84484 ASSAY OF TROPONIN QUANT: CPT | Performed by: PHYSICIAN ASSISTANT

## 2025-02-22 PROCEDURE — 70496 CT ANGIOGRAPHY HEAD: CPT | Performed by: RADIOLOGY

## 2025-02-22 PROCEDURE — 2060000001 HC INTERMEDIATE ICU ROOM DAILY

## 2025-02-22 PROCEDURE — 2500000005 HC RX 250 GENERAL PHARMACY W/O HCPCS: Performed by: PHYSICIAN ASSISTANT

## 2025-02-22 PROCEDURE — 2550000001 HC RX 255 CONTRASTS: Performed by: INTERNAL MEDICINE

## 2025-02-22 PROCEDURE — 83735 ASSAY OF MAGNESIUM: CPT | Performed by: NURSE PRACTITIONER

## 2025-02-22 PROCEDURE — 85025 COMPLETE CBC W/AUTO DIFF WBC: CPT

## 2025-02-22 PROCEDURE — 2500000004 HC RX 250 GENERAL PHARMACY W/ HCPCS (ALT 636 FOR OP/ED): Performed by: NURSE PRACTITIONER

## 2025-02-22 PROCEDURE — 2500000001 HC RX 250 WO HCPCS SELF ADMINISTERED DRUGS (ALT 637 FOR MEDICARE OP): Performed by: NURSE PRACTITIONER

## 2025-02-22 PROCEDURE — 36415 COLL VENOUS BLD VENIPUNCTURE: CPT | Performed by: PHYSICIAN ASSISTANT

## 2025-02-22 PROCEDURE — 70498 CT ANGIOGRAPHY NECK: CPT | Performed by: RADIOLOGY

## 2025-02-22 PROCEDURE — 70498 CT ANGIOGRAPHY NECK: CPT

## 2025-02-22 PROCEDURE — 99291 CRITICAL CARE FIRST HOUR: CPT | Performed by: STUDENT IN AN ORGANIZED HEALTH CARE EDUCATION/TRAINING PROGRAM

## 2025-02-22 PROCEDURE — 83605 ASSAY OF LACTIC ACID: CPT

## 2025-02-22 PROCEDURE — 82947 ASSAY GLUCOSE BLOOD QUANT: CPT

## 2025-02-22 PROCEDURE — 2500000001 HC RX 250 WO HCPCS SELF ADMINISTERED DRUGS (ALT 637 FOR MEDICARE OP): Performed by: PHYSICIAN ASSISTANT

## 2025-02-22 PROCEDURE — 84132 ASSAY OF SERUM POTASSIUM: CPT

## 2025-02-22 PROCEDURE — 84443 ASSAY THYROID STIM HORMONE: CPT

## 2025-02-22 PROCEDURE — 84132 ASSAY OF SERUM POTASSIUM: CPT | Performed by: INTERNAL MEDICINE

## 2025-02-22 PROCEDURE — 83735 ASSAY OF MAGNESIUM: CPT | Performed by: PHYSICIAN ASSISTANT

## 2025-02-22 PROCEDURE — 84484 ASSAY OF TROPONIN QUANT: CPT

## 2025-02-22 PROCEDURE — 2500000002 HC RX 250 W HCPCS SELF ADMINISTERED DRUGS (ALT 637 FOR MEDICARE OP, ALT 636 FOR OP/ED): Performed by: PHYSICIAN ASSISTANT

## 2025-02-22 RX ORDER — FUROSEMIDE 10 MG/ML
20 INJECTION INTRAMUSCULAR; INTRAVENOUS DAILY
Status: DISCONTINUED | OUTPATIENT
Start: 2025-02-22 | End: 2025-02-26

## 2025-02-22 RX ORDER — POLYMYXIN B SULFATE AND TRIMETHOPRIM 1; 10000 MG/ML; [USP'U]/ML
1 SOLUTION OPHTHALMIC
Status: DISCONTINUED | OUTPATIENT
Start: 2025-02-22 | End: 2025-02-27

## 2025-02-22 RX ORDER — CEFTRIAXONE 1 G/50ML
1 INJECTION, SOLUTION INTRAVENOUS EVERY 24 HOURS
Status: DISCONTINUED | OUTPATIENT
Start: 2025-02-23 | End: 2025-02-22

## 2025-02-22 RX ADMIN — HEPARIN SODIUM 5000 UNITS: 5000 INJECTION, SOLUTION INTRAVENOUS; SUBCUTANEOUS at 04:26

## 2025-02-22 RX ADMIN — CEFTRIAXONE SODIUM 1 G: 1 INJECTION, SOLUTION INTRAVENOUS at 00:08

## 2025-02-22 RX ADMIN — HEPARIN SODIUM 5000 UNITS: 5000 INJECTION, SOLUTION INTRAVENOUS; SUBCUTANEOUS at 20:24

## 2025-02-22 RX ADMIN — PANTOPRAZOLE SODIUM 40 MG: 40 INJECTION, POWDER, FOR SOLUTION INTRAVENOUS at 06:13

## 2025-02-22 RX ADMIN — POLYVINYL ALCOHOL, POVIDONE 1 DROP: 14; 6 SOLUTION/ DROPS OPHTHALMIC at 20:24

## 2025-02-22 RX ADMIN — POLYMYXIN B SULFATE AND TRIMETHOPRIM 1 DROP: 10000; 1 SOLUTION OPHTHALMIC at 22:20

## 2025-02-22 RX ADMIN — IPRATROPIUM BROMIDE AND ALBUTEROL SULFATE 3 ML: 2.5; .5 SOLUTION RESPIRATORY (INHALATION) at 14:13

## 2025-02-22 RX ADMIN — Medication 4 L/MIN: at 19:26

## 2025-02-22 RX ADMIN — HEPARIN SODIUM 5000 UNITS: 5000 INJECTION, SOLUTION INTRAVENOUS; SUBCUTANEOUS at 12:56

## 2025-02-22 RX ADMIN — Medication 4 L/MIN: at 08:22

## 2025-02-22 RX ADMIN — IPRATROPIUM BROMIDE AND ALBUTEROL SULFATE 3 ML: 2.5; .5 SOLUTION RESPIRATORY (INHALATION) at 21:01

## 2025-02-22 RX ADMIN — FUROSEMIDE 20 MG: 10 INJECTION, SOLUTION INTRAVENOUS at 12:00

## 2025-02-22 RX ADMIN — INSULIN LISPRO 2 UNITS: 100 INJECTION, SOLUTION INTRAVENOUS; SUBCUTANEOUS at 08:57

## 2025-02-22 RX ADMIN — INSULIN LISPRO 4 UNITS: 100 INJECTION, SOLUTION INTRAVENOUS; SUBCUTANEOUS at 04:26

## 2025-02-22 RX ADMIN — ACETAMINOPHEN 650 MG: 650 SUPPOSITORY RECTAL at 08:59

## 2025-02-22 RX ADMIN — AZITHROMYCIN MONOHYDRATE 500 MG: 500 INJECTION, POWDER, LYOPHILIZED, FOR SOLUTION INTRAVENOUS at 00:49

## 2025-02-22 RX ADMIN — Medication 4 L/MIN: at 14:15

## 2025-02-22 RX ADMIN — IOHEXOL 70 ML: 350 INJECTION, SOLUTION INTRAVENOUS at 16:39

## 2025-02-22 RX ADMIN — POLYMYXIN B SULFATE AND TRIMETHOPRIM 1 DROP: 10000; 1 SOLUTION OPHTHALMIC at 18:30

## 2025-02-22 RX ADMIN — INSULIN LISPRO 6 UNITS: 100 INJECTION, SOLUTION INTRAVENOUS; SUBCUTANEOUS at 00:08

## 2025-02-22 ASSESSMENT — COGNITIVE AND FUNCTIONAL STATUS - GENERAL
WALKING IN HOSPITAL ROOM: TOTAL
MOBILITY SCORE: 6
TOILETING: TOTAL
PERSONAL GROOMING: TOTAL
STANDING UP FROM CHAIR USING ARMS: TOTAL
MOVING FROM LYING ON BACK TO SITTING ON SIDE OF FLAT BED WITH BEDRAILS: TOTAL
MOVING TO AND FROM BED TO CHAIR: TOTAL
DRESSING REGULAR UPPER BODY CLOTHING: TOTAL
TURNING FROM BACK TO SIDE WHILE IN FLAT BAD: TOTAL
HELP NEEDED FOR BATHING: TOTAL
EATING MEALS: TOTAL
DAILY ACTIVITIY SCORE: 6
CLIMB 3 TO 5 STEPS WITH RAILING: TOTAL
DRESSING REGULAR LOWER BODY CLOTHING: TOTAL

## 2025-02-22 ASSESSMENT — PAIN - FUNCTIONAL ASSESSMENT
PAIN_FUNCTIONAL_ASSESSMENT: 0-10

## 2025-02-22 ASSESSMENT — PAIN SCALES - GENERAL
PAINLEVEL_OUTOF10: 0 - NO PAIN

## 2025-02-22 NOTE — H&P
History Of Present Illness  Adela Barrett is a 69 y.o. female presenting with admitted to the hospital from nursing home related to hypoxia and change in mental status patient had a past medical history of multiple CVA with left-sided weakness history of pulmonary hypertension.  Patient seen in emergency room and tested positive for influenza patient continued to be mildly lethargic follow-up simple commands.     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, Anxiety and depression, Bipolar 1 disorder (Multi), Calculus of gallbladder without cholecystitis, Carotid artery disease (CMS-HCC), Carpal tunnel syndrome, Cervical spondylosis, CHF (congestive heart failure), CKD (chronic kidney disease), CKD (chronic kidney disease), stage II, Contracture of left elbow, COPD (chronic obstructive pulmonary disease) (Multi), CVA (cerebral vascular accident) (Multi), Diabetes mellitus (Multi), 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    Surgical History  She has a past surgical history that includes Carpal tunnel release (Left); Tonsillectomy; Cataract extraction; Breast surgery; and NM injection.     Social History  She reports that she has quit smoking. Her smoking use included cigarettes. She does not have any smokeless tobacco history on file. She reports that she does not currently use alcohol. She reports that she does not use  "drugs.    Family History  Family History   Adopted: Yes   Family history unknown: Yes        Allergies  Bee venom protein (honey bee), Carisoprodol, Diclofenac, Lamotrigine, Metformin, and Venom-wasp    Review of Systems   Cannot get review of system patient is mildly lethargic     Physical Exam  HENT:      Right Ear: External ear normal.      Left Ear: External ear normal.      Mouth/Throat:      Mouth: Mucous membranes are moist.   Cardiovascular:      Rate and Rhythm: Normal rate and regular rhythm.      Heart sounds: No murmur heard.     No friction rub. No gallop.   Pulmonary:      Effort: No accessory muscle usage or respiratory distress.      Breath sounds: No stridor. No wheezing or rhonchi.   Chest:      Chest wall: No tenderness.   Abdominal:      General: There is no distension.      Palpations: There is no mass.      Tenderness: There is no abdominal tenderness. There is no guarding or rebound.   Musculoskeletal:         General: No deformity or signs of injury.      Cervical back: No rigidity or tenderness. Normal range of motion.      Right lower leg: No edema.      Left lower leg: No edema.   Skin:     Coloration: Skin is not jaundiced or pale.      Findings: No lesion.   Neurological:   Mildly lethargic but arousable move extremities except the left side the weaker side related to her stroke       Last Recorded Vitals  Blood pressure 142/69, pulse 106, temperature (!) 38.1 °C (100.6 °F), temperature source Temporal, resp. rate 23, height 1.651 m (5' 5\"), weight 83.9 kg (185 lb), SpO2 92%.    Labs    Admission on 02/21/2025   Component Date Value Ref Range Status    WBC 02/21/2025 17.0 (H)  4.4 - 11.3 x10*3/uL Final    nRBC 02/21/2025 0.0  0.0 - 0.0 /100 WBCs Final    RBC 02/21/2025 4.57  4.00 - 5.20 x10*6/uL Final    Hemoglobin 02/21/2025 12.0  12.0 - 16.0 g/dL Final    Hematocrit 02/21/2025 39.5  36.0 - 46.0 % Final    MCV 02/21/2025 86  80 - 100 fL Final    MCH 02/21/2025 26.3  26.0 - 34.0 pg Final "    MCHC 02/21/2025 30.4 (L)  32.0 - 36.0 g/dL Final    RDW 02/21/2025 18.2 (H)  11.5 - 14.5 % Final    Platelets 02/21/2025 225  150 - 450 x10*3/uL Final    Neutrophils % 02/21/2025 88.1  40.0 - 80.0 % Final    Immature Granulocytes %, Automated 02/21/2025 0.5  0.0 - 0.9 % Final    Immature Granulocyte Count (IG) includes promyelocytes, myelocytes and metamyelocytes but does not include bands. Percent differential counts (%) should be interpreted in the context of the absolute cell counts (cells/UL).    Lymphocytes % 02/21/2025 4.2  13.0 - 44.0 % Final    Monocytes % 02/21/2025 7.0  2.0 - 10.0 % Final    Eosinophils % 02/21/2025 0.0  0.0 - 6.0 % Final    Basophils % 02/21/2025 0.2  0.0 - 2.0 % Final    Neutrophils Absolute 02/21/2025 15.00 (H)  1.20 - 7.70 x10*3/uL Final    Percent differential counts (%) should be interpreted in the context of the absolute cell counts (cells/uL).    Immature Granulocytes Absolute, Au* 02/21/2025 0.09  0.00 - 0.70 x10*3/uL Final    Lymphocytes Absolute 02/21/2025 0.71 (L)  1.20 - 4.80 x10*3/uL Final    Monocytes Absolute 02/21/2025 1.20 (H)  0.10 - 1.00 x10*3/uL Final    Eosinophils Absolute 02/21/2025 0.00  0.00 - 0.70 x10*3/uL Final    Basophils Absolute 02/21/2025 0.04  0.00 - 0.10 x10*3/uL Final    Glucose 02/21/2025 290 (H)  74 - 99 mg/dL Final    Sodium 02/21/2025 136  136 - 145 mmol/L Final    Potassium 02/21/2025 4.3  3.5 - 5.3 mmol/L Final    Chloride 02/21/2025 98  98 - 107 mmol/L Final    Bicarbonate 02/21/2025 27  21 - 32 mmol/L Final    Anion Gap 02/21/2025 15  10 - 20 mmol/L Final    Urea Nitrogen 02/21/2025 27 (H)  6 - 23 mg/dL Final    Creatinine 02/21/2025 1.36 (H)  0.50 - 1.05 mg/dL Final    eGFR 02/21/2025 43 (L)  >60 mL/min/1.73m*2 Final    Calculations of estimated GFR are performed using the 2021 CKD-EPI Study Refit equation without the race variable for the IDMS-Traceable creatinine methods.  https://jasn.asnjournals.org/content/early/2021/09/22/ASN.1241916198     Calcium 02/21/2025 10.8 (H)  8.6 - 10.3 mg/dL Final    Albumin 02/21/2025 3.5  3.4 - 5.0 g/dL Final    Alkaline Phosphatase 02/21/2025 97  33 - 136 U/L Final    Total Protein 02/21/2025 7.4  6.4 - 8.2 g/dL Final    AST 02/21/2025 17  9 - 39 U/L Final    Bilirubin, Total 02/21/2025 0.8  0.0 - 1.2 mg/dL Final    ALT 02/21/2025 16  7 - 45 U/L Final    Patients treated with Sulfasalazine may generate falsely decreased results for ALT.    Lactate 02/21/2025 1.2  0.4 - 2.0 mmol/L Final    Blood Culture 02/21/2025 Loaded on Instrument - Culture in progress   Preliminary    Blood Culture 02/21/2025 Loaded on Instrument - Culture in progress   Preliminary    POCT pH, Venous 02/21/2025 7.47 (H)  7.33 - 7.43 pH Final    POCT pCO2, Venous 02/21/2025 44  41 - 51 mm Hg Final    POCT pO2, Venous 02/21/2025 63 (H)  35 - 45 mm Hg Final    POCT SO2, Venous 02/21/2025 91 (H)  45 - 75 % Final    POCT Oxy Hemoglobin, Venous 02/21/2025 88.6 (H)  45.0 - 75.0 % Final    POCT Hematocrit Calculated, Venous 02/21/2025 37.0  36.0 - 46.0 % Final    POCT Sodium, Venous 02/21/2025 135 (L)  136 - 145 mmol/L Final    POCT Potassium, Venous 02/21/2025 5.3  3.5 - 5.3 mmol/L Final    POCT Chloride, Venous 02/21/2025 102  98 - 107 mmol/L Final    POCT Ionized Calicum, Venous 02/21/2025 1.43 (H)  1.10 - 1.33 mmol/L Final    POCT Glucose, Venous 02/21/2025 318 (H)  74 - 99 mg/dL Final    POCT Lactate, Venous 02/21/2025 1.4  0.4 - 2.0 mmol/L Final    POCT Base Excess, Venous 02/21/2025 7.4 (H)  -2.0 - 3.0 mmol/L Final    POCT HCO3 Calculated, Venous 02/21/2025 32.0 (H)  22.0 - 26.0 mmol/L Final    POCT Hemoglobin, Venous 02/21/2025 12.3  12.0 - 16.0 g/dL Final    POCT Anion Gap, Venous 02/21/2025 6.0 (L)  10.0 - 25.0 mmol/L Final    Patient Temperature 02/21/2025    Final    NOTE: Patient Results are Not Corrected for Temperature    FiO2 02/21/2025 21  % Final    Troponin I, High Sensitivity 02/21/2025 46 (H)  0 - 13 ng/L Final    Color, Urine  02/21/2025 Orange (N)  Light-Yellow, Yellow, Dark-Yellow Final    Appearance, Urine 02/21/2025 Ex.Turbid (N)  Clear Final    Specific Gravity, Urine 02/21/2025 1.021  1.005 - 1.035 Final    pH, Urine 02/21/2025 6.5  5.0, 5.5, 6.0, 6.5, 7.0, 7.5, 8.0 Final    Protein, Urine 02/21/2025 200 (2+) (A)  NEGATIVE, 10 (TRACE), 20 (TRACE) mg/dL Final    Glucose, Urine 02/21/2025 Normal  Normal mg/dL Final    Blood, Urine 02/21/2025 OVER (3+) (A)  NEGATIVE mg/dL Final    Ketones, Urine 02/21/2025 NEGATIVE  NEGATIVE mg/dL Final    Bilirubin, Urine 02/21/2025 NEGATIVE  NEGATIVE mg/dL Final    Urobilinogen, Urine 02/21/2025 2 (1+) (A)  Normal mg/dL Final    Due to a manufacturing issue, low positive urobilinogen results may be falsely positive. Correlate with urine bilirubin and additional clinical/laboratory findings to assess the risk of hemolytic anemia or liver disease. If clinically indicated, repeat testing with an alternate method is available by contacting the laboratory within 24 hours.    Some pigments and medications may cause a false positive urobilinogen.    Nitrite, Urine 02/21/2025 NEGATIVE  NEGATIVE Final    Leukocyte Esterase, Urine 02/21/2025 500 Siddhartha/uL (A)  NEGATIVE Final    Extra Tube 02/21/2025 Hold for add-ons.   Final    Auto resulted.    Extra Tube 02/21/2025 Hold for add-ons.   Final    Auto resulted.    BNP 02/21/2025 337 (H)  0 - 99 pg/mL Final    POCT pH, Venous 02/21/2025 7.46 (H)  7.33 - 7.43 pH Final    POCT pCO2, Venous 02/21/2025 42  41 - 51 mm Hg Final    POCT pO2, Venous 02/21/2025 74 (H)  35 - 45 mm Hg Final    POCT SO2, Venous 02/21/2025 96 (H)  45 - 75 % Final    POCT Oxy Hemoglobin, Venous 02/21/2025 93.5 (H)  45.0 - 75.0 % Final    POCT Hematocrit Calculated, Venous 02/21/2025 35.0 (L)  36.0 - 46.0 % Final    POCT Sodium, Venous 02/21/2025 134 (L)  136 - 145 mmol/L Final    POCT Potassium, Venous 02/21/2025 4.8  3.5 - 5.3 mmol/L Final    POCT Chloride, Venous 02/21/2025 103  98 - 107  mmol/L Final    POCT Ionized Calicum, Venous 02/21/2025 1.38 (H)  1.10 - 1.33 mmol/L Final    POCT Glucose, Venous 02/21/2025 349 (H)  74 - 99 mg/dL Final    POCT Lactate, Venous 02/21/2025 1.3  0.4 - 2.0 mmol/L Final    POCT Base Excess, Venous 02/21/2025 5.5 (H)  -2.0 - 3.0 mmol/L Final    POCT HCO3 Calculated, Venous 02/21/2025 29.9 (H)  22.0 - 26.0 mmol/L Final    POCT Hemoglobin, Venous 02/21/2025 11.7 (L)  12.0 - 16.0 g/dL Final    POCT Anion Gap, Venous 02/21/2025 6.0 (L)  10.0 - 25.0 mmol/L Final    Patient Temperature 02/21/2025    Final    NOTE: Patient Results are Not Corrected for Temperature    FiO2 02/21/2025 21  % Final    Troponin I, High Sensitivity 02/21/2025 43 (H)  0 - 13 ng/L Final    WBC, Urine 02/21/2025 >50 (A)  1-5, NONE /HPF Final    WBC Clumps, Urine 02/21/2025 MANY  Reference range not established. /HPF Final    RBC, Urine 02/21/2025 >20 (A)  NONE, 1-2, 3-5 /HPF Final    Squamous Epithelial Cells, Urine 02/21/2025 1-9 (SPARSE)  Reference range not established. /HPF Final    Bacteria, Urine 02/21/2025 4+ (A)  NONE SEEN /HPF Final    Budding Yeast, Urine 02/21/2025 PRESENT (A)  NONE /HPF Final    Mucus, Urine 02/21/2025 2+  Reference range not established. /LPF Final    Flu A Result 02/21/2025 Detected (A)  Not Detected Final    Flu B Result 02/21/2025 Not Detected  Not Detected Final    Coronavirus 2019, PCR 02/21/2025 Not Detected  Not Detected Final    POCT Glucose 02/21/2025 340 (H)  74 - 99 mg/dL Final    WBC 02/22/2025 10.3  4.4 - 11.3 x10*3/uL Final    nRBC 02/22/2025 0.0  0.0 - 0.0 /100 WBCs Final    RBC 02/22/2025 4.24  4.00 - 5.20 x10*6/uL Final    Hemoglobin 02/22/2025 11.2 (L)  12.0 - 16.0 g/dL Final    Hematocrit 02/22/2025 38.0  36.0 - 46.0 % Final    MCV 02/22/2025 90  80 - 100 fL Final    MCH 02/22/2025 26.4  26.0 - 34.0 pg Final    MCHC 02/22/2025 29.5 (L)  32.0 - 36.0 g/dL Final    RDW 02/22/2025 18.2 (H)  11.5 - 14.5 % Final    Platelets 02/22/2025 175  150 - 450 x10*3/uL  Final    Glucose 02/22/2025 208 (H)  74 - 99 mg/dL Final    Sodium 02/22/2025 139  136 - 145 mmol/L Final    Potassium 02/22/2025 3.9  3.5 - 5.3 mmol/L Final    Chloride 02/22/2025 102  98 - 107 mmol/L Final    Bicarbonate 02/22/2025 27  21 - 32 mmol/L Final    Anion Gap 02/22/2025 14  10 - 20 mmol/L Final    Urea Nitrogen 02/22/2025 32 (H)  6 - 23 mg/dL Final    Creatinine 02/22/2025 1.24 (H)  0.50 - 1.05 mg/dL Final    eGFR 02/22/2025 48 (L)  >60 mL/min/1.73m*2 Final    Calculations of estimated GFR are performed using the 2021 CKD-EPI Study Refit equation without the race variable for the IDMS-Traceable creatinine methods.  https://jasn.asnjournals.org/content/early/2021/09/22/ASN.7566023157    Calcium 02/22/2025 10.3  8.6 - 10.3 mg/dL Final    Magnesium 02/22/2025 2.13  1.60 - 2.40 mg/dL Final    L. pneumophila Urine Ag 02/21/2025 Negative  Negative Final    Streptococcus pneumoniae Ag, Urine 02/21/2025 Negative  Negative Final    Troponin I, High Sensitivity 02/21/2025 43 (H)  0 - 13 ng/L Final    Troponin I, High Sensitivity 02/22/2025 38 (H)  0 - 13 ng/L Final    POCT Glucose 02/21/2025 288 (H)  74 - 99 mg/dL Final    POCT Glucose 02/22/2025 280 (H)  74 - 99 mg/dL Final    POCT Glucose 02/22/2025 211 (H)  74 - 99 mg/dL Final    POCT Glucose 02/22/2025 164 (H)  74 - 99 mg/dL Final    Ventricular Rate 02/21/2025 127  BPM Preliminary    Atrial Rate 02/21/2025 127  BPM Preliminary    NC Interval 02/21/2025 152  ms Preliminary    QRS Duration 02/21/2025 74  ms Preliminary    QT Interval 02/21/2025 302  ms Preliminary    QTC Calculation(Bazett) 02/21/2025 438  ms Preliminary    P [place] 02/21/2025 95  degrees Preliminary    R Axis 02/21/2025 -57  degrees Preliminary    T [place] 02/21/2025 96  degrees Preliminary    QRS Count 02/21/2025 21  beats Preliminary    Q Onset 02/21/2025 217  ms Preliminary    P Onset 02/21/2025 141  ms Preliminary    P Offset 02/21/2025 188  ms Preliminary    T Offset 02/21/2025 368  ms  Preliminary    QTC Ha 02/21/2025 388  ms Preliminary         Imaging     Electrocardiogram, 12-lead PRN ACS symptoms  Sinus tachycardia  Left axis deviation  Inferior infarct , age undetermined  Possible Anterior infarct , age undetermined  T wave abnormality, consider lateral ischemia  Abnormal ECG  No previous ECGs available       Patient Active Problem List   Diagnosis    Sepsis with encephalopathy without septic shock, due to unspecified organism (Multi)    UTI (urinary tract infection)    Influenza A    Acute hypoxic respiratory failure (Multi)    History of COPD    (HFpEF) heart failure with preserved ejection fraction    Pulmonary HTN (Multi)    AS (aortic stenosis)    Altered mental status    Elevated troponin I level         Assessment/Plan   Assessment & Plan  Sepsis with encephalopathy without septic shock, due to unspecified organism (Multi)    UTI (urinary tract infection)    Influenza A    Acute hypoxic respiratory failure (Multi)    History of COPD    (HFpEF) heart failure with preserved ejection fraction    Pulmonary HTN (Multi)    AS (aortic stenosis)    Altered mental status    Elevated troponin I level      Acute respiratory failure related to influenza A x-ray was reviewed showed mild CHF start patient on Lasix  Start patient on Tamiflu  Change in mental status related to acute metabolic encephalopathy from acute respiratory failure and influenza A  Acute diastolic congestive heart failure x-ray showed mild pulmonary edema start patient on Lasix  Elevation of troponin and significant supply and demand   Sepsis related to influenza A mild no organ dysfunction    I spent 55 minutes in the professional and overall care of this patient.      CHIN Mercer MD

## 2025-02-22 NOTE — NURSING NOTE
1557 This RN noticed change is mentation from baseline. NP notified and en route.   1600 NP at bedside.   1607 Brain attack called.

## 2025-02-22 NOTE — NURSING NOTE
Abg attempt 2x on right radial with positive allens test, unsuccessful. Compression held and bleeding stopped. Will attempt again after CT as per MD

## 2025-02-22 NOTE — CARE PLAN
Patient remains in ST on tele and on 4LNC. Fatigued and lethargic throughout shift. VSS, no c/o pain. IV rocephin and zithromax given as ordered. NS running at 75 mL/hr. Failed nursing bedside swallow, patient NPO. Safety maintained. Call light within reach.   Problem: Safety - Adult  Goal: Free from fall injury  Outcome: Progressing     Problem: Chronic Conditions and Co-morbidities  Goal: Patient's chronic conditions and co-morbidity symptoms are monitored and maintained or improved  Outcome: Progressing     Problem: Nutrition  Goal: Nutrient intake appropriate for maintaining nutritional needs  Outcome: Progressing     Problem: Pain - Adult  Goal: Verbalizes/displays adequate comfort level or baseline comfort level  Outcome: Progressing     Problem: Discharge Planning  Goal: Discharge to home or other facility with appropriate resources  Outcome: Progressing     Problem: Skin  Goal: Participates in plan/prevention/treatment measures  Outcome: Progressing  Flowsheets (Taken 2/21/2025 2314)  Participates in plan/prevention/treatment measures: Elevate heels  Goal: Prevent/manage excess moisture  Outcome: Progressing  Flowsheets (Taken 2/21/2025 2314)  Prevent/manage excess moisture:   Cleanse incontinence/protect with barrier cream   Monitor for/manage infection if present  Goal: Prevent/minimize sheer/friction injuries  Outcome: Progressing  Flowsheets (Taken 2/21/2025 2314)  Prevent/minimize sheer/friction injuries:   Use pull sheet   HOB 30 degrees or less  Goal: Promote/optimize nutrition  Outcome: Progressing  Flowsheets (Taken 2/21/2025 2314)  Promote/optimize nutrition: Discuss with provider if NPO > 2 days  Goal: Promote skin healing  Outcome: Progressing  Flowsheets (Taken 2/21/2025 2314)  Promote skin healing:   Assess skin/pad under line(s)/device(s)   Protective dressings over bony prominences   Turn/reposition every 2 hours/use positioning/transfer devices   Ensure correct size (line/device) and apply  per  instructions   Rotate device position/do not position patient on device

## 2025-02-22 NOTE — CARE PLAN
The patient's goals for the shift include    Problem: Respiratory  Goal: Clear secretions with interventions this shift  Outcome: Progressing  Goal: No signs of respiratory distress (eg. Use of accessory muscles. Peds grunting)  Outcome: Progressing  Goal: Patent airway maintained this shift  Outcome: Progressing  Goal: Tolerate pulmonary toileting this shift  Outcome: Progressing     Problem: Safety - Adult  Goal: Free from fall injury  Outcome: Progressing     Problem: Chronic Conditions and Co-morbidities  Goal: Patient's chronic conditions and co-morbidity symptoms are monitored and maintained or improved  Outcome: Progressing     Problem: Nutrition  Goal: Nutrient intake appropriate for maintaining nutritional needs  Outcome: Progressing     Problem: Pain - Adult  Goal: Verbalizes/displays adequate comfort level or baseline comfort level  Outcome: Progressing     Problem: Discharge Planning  Goal: Discharge to home or other facility with appropriate resources  Outcome: Progressing     Problem: Skin  Goal: Participates in plan/prevention/treatment measures  Outcome: Progressing  Goal: Prevent/manage excess moisture  Outcome: Progressing  Goal: Prevent/minimize sheer/friction injuries  Outcome: Progressing  Goal: Promote/optimize nutrition  Outcome: Progressing  Goal: Promote skin healing  Outcome: Progressing       The clinical goals for the shift include Patient will remain hds by end of shift 2/22/25 by 0700.

## 2025-02-22 NOTE — PROGRESS NOTES
"Nutrition Initial Assessment:   Nutrition Assessment    Reason for Assessment: Admission nursing screening (MST of 2)    Patient is a 69 y.o. female presenting with lethargy at LT and found to be influenza A positive.    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, mild aortic stenosis, pulmonary HTN, carotid artery disease, intracranial atherosclerosis, cognitive communication deficit, CKD 2, non small cell lung cancer of LLL (Dx 2019, s/p carboplatin/paclitaxel/pembrolizumab and RT), former smoker history,  incidental PE (4/2020), COPD on PRN oxygen (2 liters nasal cannula prn), HFpEF, prolonged QT interval, type II diabetes (Hem A1C 6.4% April 2024), HTN, HLD, Bipolar affective disorder, anxiety with depression, contracture left elbow, oropharyngeal phase dysphagia, spinal stenosis, carpal tunnel syndrome, UTI, pneumonia, difficulty walking, falls, and neuropathy     Nutrition History:  Energy Intake:  (NPO)  Food and Nutrient History: Per floor nurse pt failed RN bedside swallow last evening.  [facility] transfer paperwork indicates pt was on WILL/carb consistent regular texture diet prior to admission.  Past medical history indicating history of dysphagia dating back to 2023 in archives however after talking to SNF it does not appear she has recently been on a dysphagia diet at the facility.  They did note that she typicall eats best at breakfast and eats/drinks very slowly.  Typically takes meds whole in pudding.  Vitamin/Herbal Supplement Use: januvia, vit D       Anthropometrics:  Height: 165.1 cm (5' 5\")   Weight: 83.9 kg (185 lb)   BMI (Calculated): 30.79             Weight History:    02/21/25 02/22/25 04:00   Weight 91 kg (200 lb 9.9 oz) 83.9 kg (185 lb)   Discrepancies noted since admission, called SNF to obtain more weights and could only get most recent weight of 218.1# on 2/10/25.  Most other weights in archives are >200#.  Weight Change %:  Weight History / % " Weight Change: unknown due to weight discrepencies however it appears weight had recently trended down    Nutrition Focused Physical Exam Findings:    Subcutaneous Fat Loss:   Defer Subcutaneous Fat Loss Assessment: Defer all  Defer All Reason: not appropriate at this time  Muscle Wasting:  Defer Muscle Wasting Assessment: Defer all  Defer All Reason: not appropriate at this time  Edema:  Edema: +1 trace  Edema Location: BLE per nursing assessment  Physical Findings:  Hair: Negative  Eyes: Negative  Nails: Negative  Skin: Negative  Mouth Findings: Dysphagia  Teeth Findings: Edentulous    Nutrition Significant Labs:  BMP Trend:   Results from last 7 days   Lab Units 02/22/25  0510 02/21/25  1117   GLUCOSE mg/dL 208* 290*   CALCIUM mg/dL 10.3 10.8*   SODIUM mmol/L 139 136   POTASSIUM mmol/L 3.9 4.3   CO2 mmol/L 27 27   CHLORIDE mmol/L 102 98   BUN mg/dL 32* 27*   CREATININE mg/dL 1.24* 1.36*    , A1C:  Lab Results   Component Value Date    HGBA1C 6.4 (H) 04/30/2024       Nutrition Specific Medications:  aspirin, 81 mg, oral, Daily  atorvastatin, 80 mg, oral, Nightly  [Held by provider] baclofen, 5 mg, oral, BID  [START ON 2/23/2025] cefTRIAXone, 2 g, intravenous, q24h  cholecalciferol, 2,000 Units, oral, Daily  furosemide, 20 mg, intravenous, Daily  [Held by provider] furosemide, 20 mg, oral, Daily  [Held by provider] gabapentin, 100 mg, oral, BID  heparin (porcine), 5,000 Units, subcutaneous, q8h  insulin lispro, 0-10 Units, subcutaneous, q4h  ipratropium-albuteroL, 3 mL, nebulization, TID  lubricating eye drops, 1 drop, Both Eyes, BID  lurasidone, 120 mg, oral, Daily with breakfast  nystatin, 5 mL, Swish & Swallow, 4x daily  oseltamivir, 30 mg, oral, BID  pantoprazole, 40 mg, oral, Daily before breakfast   Or  pantoprazole, 40 mg, intravenous, Daily before breakfast  potassium chloride CR, 10 mEq, oral, Daily  vortioxetine, 20 mg, oral, Daily      Dietary Orders (From admission, onward)       Start     Ordered     02/21/25 1902  NPO Diet; Effective now  Diet effective now        Comments: Please let medical house know if she passes RN bedside swallow    02/21/25 1904 02/21/25 1639  May Not Participate in Room Service  ( ROOM SERVICE MAY NOT PARTICIPATE)  Once        Question:  .  Answer:  Yes    02/21/25 1638                     Estimated Needs:   Method for Estimating Needs: 1710-2000kcal or 30-35kcal/kg IBW of 57kg  Method for Estimating 24 Hour Protein Needs: 68-86g or 1.2-1.5g/kg IBW  Method for Estimating 24 Hour Fluid Needs: 1ml/kcal or per MD recs  Ideal body weight: 57 kg (125 lb 10.6 oz)  Adjusted ideal body weight: 67.8 kg (149 lb 6.4 oz)          Nutrition Diagnosis   Malnutrition Diagnosis  Patient has Malnutrition Diagnosis: No    Nutrition Diagnosis  Patient has Nutrition Diagnosis: Yes  Diagnosis Status (1): New  Nutrition Diagnosis 1: Swallowing difficulty  Related to (1): history of dysphagia  As Evidenced by (1): pt with failed nursing swallow eval, ST eval pending.  Additional Nutrition Diagnosis: Diagnosis 2  Diagnosis Status (2): New  Nutrition Diagnosis 2: Inadequate energy intake  Related to (2): history of poor intakes after breakfast  As Evidenced by (2): currently inability to consume safe PO diet.       Nutrition Interventions/Recommendations   Nutrition prescription for enteral nutrition    Nutrition Recommendations:  Individualized Nutrition Prescription Provided for : Continue NPO orders, if warranted and aligns with established goals of care would suggest obtaining enteral access and initiation of the regimen outlined below.    Nutrition Interventions/Goals:   Interventions: Enteral intake  Enteral Intake: Other (Comment)  Goal: Recommend Jevity 1.5 at goal of  55mL/hr x 24 hours to provide 1980kcal, 84g pro, and 1003mL formula free water. --- consider initiate EN at 15ml/hr and advance by 10ml/hr every 6-8hrs until goal rate is met  - adjust water flush pending fluid needs. Recommend water  flush of 165mL 6x/day to provide 1993mL volume (formula + flush)  Coordination of Care with Providers: Nursing, Provider  Goal: Dipti RN, Ellen Yee APRN-CNP  Additional Interventions: Recommend ST consult, will follow for recommendations       Nutrition Monitoring and Evaluation   Food/Nutrient Related History Monitoring  Monitoring and Evaluation Plan: Enteral and parenteral nutrition intake determination  Enteral and Parenteral Nutrition Intake Determination: Enteral nutrition intake - To meet > 75% estimated energy needs (if warranted and utilized)         Biochemical Data, Medical Tests and Procedures  Monitoring and Evaluation Plan: Electrolyte/renal panel  Electrolyte and Renal Panel: Phosphorus, Magnesium, Chloride, Sodium, BUN  Criteria: Maintain within acceptable range         Goal Status: New goal(s) identified    Time Spent (min): 60 minutes

## 2025-02-22 NOTE — SIGNIFICANT EVENT
SIGNIFICANT EVENT  BRIEF BRAIN ATTACK DOCUMENTATION:     History/Exam limitations: mental status.   Additional history was obtained from per chart review    SUBJECTIVE:     Adela Barrett is a 69 y.o. with a history of multiple CVA with left-sided weakness was found to have altered mental status.  She is admitted due to acute hypoxic respiratory failure and altered mental status.  we were called by bedside nurse because patient was found obtunded, lethargic.  Upon my assessment, she was able to squeeze my hands but unable to open up her eyes.  Unable to obtain an NIH score due to altered mental status.  Initial vitals 37.2, , /74 and saturating well on 4 L.      Last Known Well Time: 3:30     ------------------------------------------------------------------------------------------------------------------------------------------     Physical Exam:    Vitals:    02/22/25 1613   BP: 148/74   Pulse: (!) 116   Resp: 17   Temp:    SpO2: 97%       Neurological Exam  Mental Status  Arousable to verbal stimuli. Orientation: Unable to assess. Patient is nonverbal. Language: Unable to assess.    Cranial Nerves  CN II: Vision test: Pupils are responsive and reactive.  - Unable to assess neurological status.  Has left-sided weakness as a baseline deficits..    Physical Exam  Constitutional:       Appearance: She is ill-appearing and toxic-appearing.   HENT:      Head: Normocephalic and atraumatic.   Eyes:      Comments: Pupils are responsive and reactive   Cardiovascular:      Rate and Rhythm: Tachycardia present. Rhythm irregular.      Pulses: Normal pulses.   Skin:     General: Skin is warm.      Capillary Refill: Capillary refill takes less than 2 seconds.   Neurological:      Comments: - Unable to assess neurological status.  Has left-sided weakness as a baseline deficits.          Interval: Baseline  Time: 6:06 PM  Person Administering Scale: Chilo Villa MD     NIH score: We were unable to obtain any  examination due to obtunded state.  VAN: VAN: Negative     ------------------------------------------------------------------------------------------------------------------------------------------     Medical Decision Making:   Metabolic encephalopathy    Discussion of Management with Other Providers:   I discussed the patient/results with: Dr. Jasso neurology     IV Thrombolysis:    No Thrombolysis contraindication reason: Working diagnosis is NOT a suspected ischemic stroke    Plan:   Upon further assessment a brain attack was called and patient was taken to CT head and CT head and neck to rule out large vessel occlusion.  Accu-Chek showed glucose of 153.  Workup for metabolic encephalopathy was obtained with CBC, CMP, lactate, troponin, TSH, ammonia.  ABG was obtained that showed pH 7.45, pCO2 45, pO2 94.  We spoke with neurologist on-call Dr. Jasso who reviewed the scan and deemed that the patient is not a candidate for TNK.  Repeated scan showed no large vessel occlusion and with no hemorrhagic or acute ischemic stroke findings.    Dr. Subramanian was present throughout the brain attack.    Assessment and plan discussed with my attending.   Chilo Villa MD   Internal Medicine, PGY-2 .

## 2025-02-23 ENCOUNTER — APPOINTMENT (OUTPATIENT)
Dept: CARDIOLOGY | Facility: HOSPITAL | Age: 70
End: 2025-02-23
Payer: MEDICARE

## 2025-02-23 ENCOUNTER — APPOINTMENT (OUTPATIENT)
Dept: RADIOLOGY | Facility: HOSPITAL | Age: 70
End: 2025-02-23
Payer: MEDICARE

## 2025-02-23 LAB
ANION GAP SERPL CALC-SCNC: 16 MMOL/L (ref 10–20)
APPARATUS: ABNORMAL
ARTERIAL PATENCY WRIST A: POSITIVE
BACTERIA BLD CULT: NORMAL
BACTERIA BLD CULT: NORMAL
BASE EXCESS BLDA CALC-SCNC: 6.8 MMOL/L (ref -2–3)
BODY TEMPERATURE: ABNORMAL
BUN SERPL-MCNC: 30 MG/DL (ref 6–23)
CALCIUM SERPL-MCNC: 10.2 MG/DL (ref 8.6–10.3)
CHLORIDE SERPL-SCNC: 106 MMOL/L (ref 98–107)
CO2 SERPL-SCNC: 28 MMOL/L (ref 21–32)
CREAT SERPL-MCNC: 1.05 MG/DL (ref 0.5–1.05)
EGFRCR SERPLBLD CKD-EPI 2021: 58 ML/MIN/1.73M*2
ERYTHROCYTE [DISTWIDTH] IN BLOOD BY AUTOMATED COUNT: 18.4 % (ref 11.5–14.5)
GLUCOSE BLD MANUAL STRIP-MCNC: 125 MG/DL (ref 74–99)
GLUCOSE BLD MANUAL STRIP-MCNC: 127 MG/DL (ref 74–99)
GLUCOSE BLD MANUAL STRIP-MCNC: 152 MG/DL (ref 74–99)
GLUCOSE BLD MANUAL STRIP-MCNC: 174 MG/DL (ref 74–99)
GLUCOSE SERPL-MCNC: 130 MG/DL (ref 74–99)
HCO3 BLDA-SCNC: 31.3 MMOL/L (ref 22–26)
HCT VFR BLD AUTO: 37.8 % (ref 36–46)
HGB BLD-MCNC: 11.3 G/DL (ref 12–16)
INHALED O2 CONCENTRATION: 40 %
MAGNESIUM SERPL-MCNC: 2.04 MG/DL (ref 1.6–2.4)
MAGNESIUM SERPL-MCNC: 2.12 MG/DL (ref 1.6–2.4)
MCH RBC QN AUTO: 26.3 PG (ref 26–34)
MCHC RBC AUTO-ENTMCNC: 29.9 G/DL (ref 32–36)
MCV RBC AUTO: 88 FL (ref 80–100)
NRBC BLD-RTO: 0 /100 WBCS (ref 0–0)
OXYHGB MFR BLDA: 95.2 % (ref 94–98)
PCO2 BLDA: 43 MM HG (ref 38–42)
PH BLDA: 7.47 PH (ref 7.38–7.42)
PLATELET # BLD AUTO: 216 X10*3/UL (ref 150–450)
PO2 BLDA: 79 MM HG (ref 85–95)
POTASSIUM SERPL-SCNC: 3.9 MMOL/L (ref 3.5–5.3)
RBC # BLD AUTO: 4.3 X10*6/UL (ref 4–5.2)
SAO2 % BLDA: 98 % (ref 94–100)
SODIUM SERPL-SCNC: 146 MMOL/L (ref 136–145)
SPECIMEN DRAWN FROM PATIENT: ABNORMAL
WBC # BLD AUTO: 8.1 X10*3/UL (ref 4.4–11.3)

## 2025-02-23 PROCEDURE — 80048 BASIC METABOLIC PNL TOTAL CA: CPT | Performed by: PHYSICIAN ASSISTANT

## 2025-02-23 PROCEDURE — 2500000002 HC RX 250 W HCPCS SELF ADMINISTERED DRUGS (ALT 637 FOR MEDICARE OP, ALT 636 FOR OP/ED): Performed by: INTERNAL MEDICINE

## 2025-02-23 PROCEDURE — 83735 ASSAY OF MAGNESIUM: CPT | Performed by: PHYSICIAN ASSISTANT

## 2025-02-23 PROCEDURE — 82947 ASSAY GLUCOSE BLOOD QUANT: CPT

## 2025-02-23 PROCEDURE — 2060000001 HC INTERMEDIATE ICU ROOM DAILY

## 2025-02-23 PROCEDURE — 85027 COMPLETE CBC AUTOMATED: CPT | Performed by: PHYSICIAN ASSISTANT

## 2025-02-23 PROCEDURE — 93005 ELECTROCARDIOGRAM TRACING: CPT

## 2025-02-23 PROCEDURE — 71045 X-RAY EXAM CHEST 1 VIEW: CPT | Performed by: RADIOLOGY

## 2025-02-23 PROCEDURE — 94640 AIRWAY INHALATION TREATMENT: CPT

## 2025-02-23 PROCEDURE — 82805 BLOOD GASES W/O2 SATURATION: CPT | Performed by: NURSE PRACTITIONER

## 2025-02-23 PROCEDURE — 2500000004 HC RX 250 GENERAL PHARMACY W/ HCPCS (ALT 636 FOR OP/ED): Performed by: NURSE PRACTITIONER

## 2025-02-23 PROCEDURE — 92610 EVALUATE SWALLOWING FUNCTION: CPT | Mod: GN | Performed by: SPEECH-LANGUAGE PATHOLOGIST

## 2025-02-23 PROCEDURE — 2500000005 HC RX 250 GENERAL PHARMACY W/O HCPCS: Performed by: PHYSICIAN ASSISTANT

## 2025-02-23 PROCEDURE — 71045 X-RAY EXAM CHEST 1 VIEW: CPT

## 2025-02-23 PROCEDURE — 2500000004 HC RX 250 GENERAL PHARMACY W/ HCPCS (ALT 636 FOR OP/ED): Performed by: PHYSICIAN ASSISTANT

## 2025-02-23 PROCEDURE — 36415 COLL VENOUS BLD VENIPUNCTURE: CPT | Performed by: PHYSICIAN ASSISTANT

## 2025-02-23 PROCEDURE — 2500000002 HC RX 250 W HCPCS SELF ADMINISTERED DRUGS (ALT 637 FOR MEDICARE OP, ALT 636 FOR OP/ED): Performed by: PHYSICIAN ASSISTANT

## 2025-02-23 RX ORDER — METOPROLOL TARTRATE 1 MG/ML
5 INJECTION, SOLUTION INTRAVENOUS EVERY 6 HOURS
Status: DISCONTINUED | OUTPATIENT
Start: 2025-02-23 | End: 2025-02-24

## 2025-02-23 RX ORDER — METOPROLOL TARTRATE 25 MG/1
25 TABLET, FILM COATED ORAL 2 TIMES DAILY
Status: DISCONTINUED | OUTPATIENT
Start: 2025-02-23 | End: 2025-02-27

## 2025-02-23 RX ORDER — POTASSIUM CHLORIDE 14.9 MG/ML
20 INJECTION INTRAVENOUS
Status: COMPLETED | OUTPATIENT
Start: 2025-02-23 | End: 2025-02-23

## 2025-02-23 RX ORDER — FUROSEMIDE 10 MG/ML
20 INJECTION INTRAMUSCULAR; INTRAVENOUS ONCE
Status: COMPLETED | OUTPATIENT
Start: 2025-02-23 | End: 2025-02-23

## 2025-02-23 RX ADMIN — METOPROLOL TARTRATE 5 MG: 5 INJECTION INTRAVENOUS at 17:22

## 2025-02-23 RX ADMIN — HEPARIN SODIUM 5000 UNITS: 5000 INJECTION, SOLUTION INTRAVENOUS; SUBCUTANEOUS at 12:34

## 2025-02-23 RX ADMIN — INSULIN LISPRO 2 UNITS: 100 INJECTION, SOLUTION INTRAVENOUS; SUBCUTANEOUS at 21:21

## 2025-02-23 RX ADMIN — POLYMYXIN B SULFATE AND TRIMETHOPRIM 1 DROP: 10000; 1 SOLUTION OPHTHALMIC at 10:01

## 2025-02-23 RX ADMIN — INSULIN LISPRO 2 UNITS: 100 INJECTION, SOLUTION INTRAVENOUS; SUBCUTANEOUS at 16:25

## 2025-02-23 RX ADMIN — POLYMYXIN B SULFATE AND TRIMETHOPRIM 1 DROP: 10000; 1 SOLUTION OPHTHALMIC at 17:22

## 2025-02-23 RX ADMIN — INSULIN LISPRO 2 UNITS: 100 INJECTION, SOLUTION INTRAVENOUS; SUBCUTANEOUS at 12:34

## 2025-02-23 RX ADMIN — POLYMYXIN B SULFATE AND TRIMETHOPRIM 1 DROP: 10000; 1 SOLUTION OPHTHALMIC at 02:20

## 2025-02-23 RX ADMIN — POLYMYXIN B SULFATE AND TRIMETHOPRIM 1 DROP: 10000; 1 SOLUTION OPHTHALMIC at 21:22

## 2025-02-23 RX ADMIN — POTASSIUM CHLORIDE 20 MEQ: 14.9 INJECTION, SOLUTION INTRAVENOUS at 05:38

## 2025-02-23 RX ADMIN — PANTOPRAZOLE SODIUM 40 MG: 40 INJECTION, POWDER, FOR SOLUTION INTRAVENOUS at 06:20

## 2025-02-23 RX ADMIN — Medication 4 L/MIN: at 19:54

## 2025-02-23 RX ADMIN — POTASSIUM CHLORIDE 20 MEQ: 14.9 INJECTION, SOLUTION INTRAVENOUS at 03:48

## 2025-02-23 RX ADMIN — METOPROLOL TARTRATE 5 MG: 5 INJECTION INTRAVENOUS at 12:34

## 2025-02-23 RX ADMIN — HEPARIN SODIUM 5000 UNITS: 5000 INJECTION, SOLUTION INTRAVENOUS; SUBCUTANEOUS at 04:22

## 2025-02-23 RX ADMIN — FUROSEMIDE 20 MG: 10 INJECTION, SOLUTION INTRAVENOUS at 04:21

## 2025-02-23 RX ADMIN — IPRATROPIUM BROMIDE AND ALBUTEROL SULFATE 3 ML: 2.5; .5 SOLUTION RESPIRATORY (INHALATION) at 14:47

## 2025-02-23 RX ADMIN — POLYMYXIN B SULFATE AND TRIMETHOPRIM 1 DROP: 10000; 1 SOLUTION OPHTHALMIC at 13:26

## 2025-02-23 RX ADMIN — CEFTRIAXONE 2 G: 2 INJECTION, POWDER, FOR SOLUTION INTRAMUSCULAR; INTRAVENOUS at 05:05

## 2025-02-23 RX ADMIN — IPRATROPIUM BROMIDE AND ALBUTEROL SULFATE 3 ML: 2.5; .5 SOLUTION RESPIRATORY (INHALATION) at 21:14

## 2025-02-23 RX ADMIN — POLYVINYL ALCOHOL, POVIDONE 1 DROP: 14; 6 SOLUTION/ DROPS OPHTHALMIC at 21:21

## 2025-02-23 RX ADMIN — FUROSEMIDE 20 MG: 10 INJECTION, SOLUTION INTRAVENOUS at 10:00

## 2025-02-23 RX ADMIN — POLYMYXIN B SULFATE AND TRIMETHOPRIM 1 DROP: 10000; 1 SOLUTION OPHTHALMIC at 05:10

## 2025-02-23 RX ADMIN — IPRATROPIUM BROMIDE AND ALBUTEROL SULFATE 3 ML: 2.5; .5 SOLUTION RESPIRATORY (INHALATION) at 07:54

## 2025-02-23 RX ADMIN — POLYVINYL ALCOHOL, POVIDONE 1 DROP: 14; 6 SOLUTION/ DROPS OPHTHALMIC at 10:00

## 2025-02-23 RX ADMIN — HEPARIN SODIUM 5000 UNITS: 5000 INJECTION, SOLUTION INTRAVENOUS; SUBCUTANEOUS at 21:21

## 2025-02-23 RX ADMIN — Medication 4 L/MIN: at 07:55

## 2025-02-23 RX ADMIN — Medication 4 L/MIN: at 14:48

## 2025-02-23 ASSESSMENT — COGNITIVE AND FUNCTIONAL STATUS - GENERAL
DRESSING REGULAR UPPER BODY CLOTHING: TOTAL
STANDING UP FROM CHAIR USING ARMS: TOTAL
DRESSING REGULAR LOWER BODY CLOTHING: TOTAL
CLIMB 3 TO 5 STEPS WITH RAILING: TOTAL
EATING MEALS: TOTAL
TOILETING: TOTAL
TURNING FROM BACK TO SIDE WHILE IN FLAT BAD: TOTAL
MOVING FROM LYING ON BACK TO SITTING ON SIDE OF FLAT BED WITH BEDRAILS: TOTAL
MOBILITY SCORE: 6
WALKING IN HOSPITAL ROOM: TOTAL
HELP NEEDED FOR BATHING: TOTAL
MOVING TO AND FROM BED TO CHAIR: TOTAL
DAILY ACTIVITIY SCORE: 6
PERSONAL GROOMING: TOTAL

## 2025-02-23 ASSESSMENT — PAIN SCALES - GENERAL
PAINLEVEL_OUTOF10: 0 - NO PAIN

## 2025-02-23 ASSESSMENT — PAIN - FUNCTIONAL ASSESSMENT
PAIN_FUNCTIONAL_ASSESSMENT: 0-10

## 2025-02-23 NOTE — PROGRESS NOTES
"Subjective  Patient had uneventful night does not complain about any chest pain patient had change in mental status overnight CAT scan was ordered on her patient continued to be tachycardic looks like sinus tachycardia on the monitor  Nursing staff was interviewed  Objectives    Last Recorded Vitals  Blood pressure 148/85, pulse (!) 116, temperature 36.2 °C (97.2 °F), temperature source Temporal, resp. rate (!) 28, height 1.651 m (5' 5\"), weight 83.6 kg (184 lb 3.2 oz), SpO2 96%.    Physical Exam  HENT:      Right Ear: External ear normal.      Left Ear: External ear normal.      Mouth/Throat:      Mouth: Mucous membranes are moist.   Cardiovascular:      Rate and Rhythm: Normal rate and regular rhythm.      Heart sounds: No murmur heard.     No friction rub. No gallop.   Pulmonary:      Effort: No accessory muscle usage or respiratory distress.      Breath sounds: No stridor. No wheezing or rhonchi.   Chest:      Chest wall: No tenderness.   Abdominal:      General: There is no distension.      Palpations: There is no mass.      Tenderness: There is no abdominal tenderness. There is no guarding or rebound.   Musculoskeletal:         General: No deformity or signs of injury.      Cervical back: No rigidity or tenderness. Normal range of motion.      Right lower leg: No edema.      Left lower leg: No edema.   Skin:     Coloration: Skin is not jaundiced or pale.      Findings: No lesion.   Neurological:   More alert follow-up simple commands weakness over the left side       Labs    No results displayed because visit has over 200 results.            Imaging     XR chest 1 view  Narrative: Interpreted By:  Tyler Concepcion,   STUDY:  XR CHEST 1 VIEW; 2/23/2025 4:08 am      INDICATION:  CLINICAL INFORMATION: Signs/Symptoms:influenza, worsening hypoxia.      COMPARISON:  02/21/2025      ACCESSION NUMBER(S):  [id number]      ORDERING CLINICIAN:  JAKI FIELD      TECHNIQUE:  Portable chest one view.      FINDINGS:  The " cardiac size is indeterminate in view of the AP projection.  There is prominence of the left hilum for which CT chest is  recommended to rule out a left hilar mass. No infiltrates or  effusions are identified.  Left basilar atelectasis is present.      Impression: 1. Questionable left hilar mass for which CT chest with intravenous  contrast is recommended for more definitive assessment.  2. Left basilar subsegmental atelectasis.      MACRO:  none      Signed by: Tyler Concepcion 2/23/2025 7:08 AM  Dictation workstation:   [id number]       Patient Active Problem List   Diagnosis    Sepsis with encephalopathy without septic shock, due to unspecified organism (Multi)    UTI (urinary tract infection)    Influenza A    Acute hypoxic respiratory failure (Multi)    History of COPD    (HFpEF) heart failure with preserved ejection fraction    Pulmonary HTN (Multi)    AS (aortic stenosis)    Altered mental status    Elevated troponin I level         Assessment/Plan   Assessment & Plan  Sepsis with encephalopathy without septic shock, due to unspecified organism (Multi)    UTI (urinary tract infection)    Influenza A    Acute hypoxic respiratory failure (Multi)    History of COPD    (HFpEF) heart failure with preserved ejection fraction    Pulmonary HTN (Multi)    AS (aortic stenosis)    Altered mental status    Elevated troponin I level      Sepsis with acute metabolic encephalopathy with influenza A and urinary tract infection continue with antibiotic waiting for culture   X-ray showed left hilar mass CAT scan was ordered on the patient  Questionable aneurysm over the CAT scan of the brain waiting for neurosurgery input  I spent 25 minutes in the professional and overall care of this patient.  High blood pressure and mild tachycardia start patient on Lopressor monitor heart rate and blood pressure x-ray no pneumonia still holding on antibiotics procalcitonin is elevated related to influenza A    CHIN Mercer MD

## 2025-02-23 NOTE — CARE PLAN
Patient ST on monitor, one episode of afib rvr HR in the 180s, only lasted a few seconds. EKG taken, notified [facility]. Now on 6L NC, notified shayla of increased oxygen demand d/t desatting on 4LNC, CXR done and IV lasix given. IV rocephin and potassium given as ordered. Mouth care provided. A&Ox2, oriented to self and time. Remains NPO, speech to see patient. Safety maintained. Call light within reach.   Problem: Safety - Adult  Goal: Free from fall injury  Outcome: Progressing     Problem: Chronic Conditions and Co-morbidities  Goal: Patient's chronic conditions and co-morbidity symptoms are monitored and maintained or improved  Outcome: Progressing     Problem: Nutrition  Goal: Nutrient intake appropriate for maintaining nutritional needs  Outcome: Progressing     Problem: Pain - Adult  Goal: Verbalizes/displays adequate comfort level or baseline comfort level  Outcome: Progressing     Problem: Discharge Planning  Goal: Discharge to home or other facility with appropriate resources  Outcome: Progressing     Problem: Skin  Goal: Participates in plan/prevention/treatment measures  Outcome: Progressing  Flowsheets (Taken 2/22/2025 2357)  Participates in plan/prevention/treatment measures: Elevate heels  Goal: Prevent/manage excess moisture  Outcome: Progressing  Flowsheets (Taken 2/22/2025 2357)  Prevent/manage excess moisture:   Monitor for/manage infection if present   Moisturize dry skin   Cleanse incontinence/protect with barrier cream  Goal: Prevent/minimize sheer/friction injuries  Outcome: Progressing  Flowsheets (Taken 2/22/2025 2357)  Prevent/minimize sheer/friction injuries:   Use pull sheet   Turn/reposition every 2 hours/use positioning/transfer devices   HOB 30 degrees or less  Goal: Promote/optimize nutrition  Outcome: Progressing  Flowsheets (Taken 2/22/2025 2357)  Promote/optimize nutrition: Discuss with provider if NPO > 2 days  Goal: Promote skin healing  Outcome: Progressing  Flowsheets (Taken  2/22/2025 0290)  Promote skin healing:   Assess skin/pad under line(s)/device(s)   Protective dressings over bony prominences   Turn/reposition every 2 hours/use positioning/transfer devices   Ensure correct size (line/device) and apply per  instructions   Rotate device position/do not position patient on device

## 2025-02-23 NOTE — PROGRESS NOTES
Speech-Language Pathology    SLP Adult Inpatient Speech-Language Pathology Clinical Swallow Evaluation    Patient Name: Adela Barrett  MRN: [id number]  Today's Date: 2/23/2025   Time Calculation  Start Time: 1113  Stop Time: 1124  Time Calculation (min): 11 min         Current Problem:   1. Sepsis with encephalopathy without septic shock, due to unspecified organism (Multi)        2. Acute cystitis without hematuria          SLP Assessment:  Pt presents with severe dysphagia characterized by wet respiratory quality, intermittently wet vocal quality, bolus holding, delayed swallow initiation, anterior spillage, slowed oral manipulation, and decreased laryngeal elevation.    Skilled SLP services are warranted in order to facilitate pt safety with PO intake, pt tolerance of LRD, and reduce risk for aspiration/penetration.     SLP TX Intervention Outcomes: Goals Established  Prognosis: Good  Treatment Tolerance: Patient tolerated treatment well  Medical Staff Made Aware: Yes  Strengths: Cooperation  Barriers: Medical Status    Plan:  Inpatient/Swing Bed or Outpatient: Inpatient  Treatment/Interventions:   SLP Plan: Skilled SLP  SLP Frequency: 3x per week  Duration: 30 days  Diet Recommendations:   Solid Consistency: NPO  Liquid Consistency: NPO  Discussed POC: Patient, Nursing  Discussed Risks/Benefits: Yes  Patient/Caregiver Agreeable: Yes    Goals:  Pt will participate in MBS in order to determine LRD and rule out aspiration/penetration.  Pt will participate in oral care 2-3x per day.  Pt will demonstrate tolerance of small, single sips of thin water and ice chips with SLP ONLY with no overt s/s aspiration/penetration.    Subjective   Current Problem:  69 y.o. female presenting with admitted to the hospital from nursing home related to hypoxia and change in mental status patient.  Patient seen in emergency room and tested positive for influenza.    General Visit Information:  Chart Reviewed: Yes  Patient Class:  Inpatient  Ordering Physician: Ruslan  Reason for Referral: dysphagia  Referred By: Ruslan  Past Medical History Relevant to Rehab: multiple CVA with left-sided weakness history of pulmonary hypertension  Prior Level of Function: WFL  [employer] Diet: Regular/Thin  Current Diet : NPO  Prior to Session Communication: Nurse    Objective:  Pain:  Pain Assessment: 0-10  Pain Score: 0  Dysphagia Diagnosis: severe dysphagia    Baseline Assessment:  Respiratory Status: O2 via NC  Behavior/Cognition: Impaired  Patient Positioning: Upright in bed  Baseline Vocal Quality: weak, intermittently aphonic  Volitional Cough: unable to elicit  Volitional Swallow: unable to elicit      Oral/Motor Assessment:  Vocal Quality: weak, intermittently aphonic  Breath Support: intermittently impaired  Management of secretions: WFL  Lingual ROM and strength: unable to assess due to cognitive deficits limiting ability to complete oral mech exam  Labial ROM and strength: unable to assess due to cognitive deficits limiting ability to complete oral mech exam  Dentition: edentulous- pt reports does not have dentures    Consistencies Trialed:  Consistencies Trialed: Yes  Consistencies Trialed: Puree, Thin (straw and cup), nectar/mildly thick, honey/moderately thick      Clinical Observations:  Patient Positioning: Upright in bed  Management of Oral Secretions: WFL  Was The 3 oz Swallow Protocol Completed: Yes- Fail  Signs/Symptoms of Aspiration: cough, wet respiratory quality  Prolonged Oral Manipulation: thin, nectar/mildly thick, honey/moderately thick, puree  Immediate Cough: N/A  Delayed Cough: thin    Inpatient Education:  Education Documentation  Pt educated on results of evaluation and recommendations.  Education Comments  Pt reported understanding however SLP suspects poor understanding due to impaired cognitive status.     Recommendations:  Risk for Aspiration: Yes  Additional Recommendations: MBS  Solid Diet Recommendations :  NPO  Liquid Diet Recommendations: NPO  Compensatory Swallowing Strategies: N/A d/t NPO status    Consultations/Referrals/Coordination of Services: N/A

## 2025-02-23 NOTE — CONSULTS
Consults    69-year-old lady.    Alteration in mental status.    She was admitted with hypoxic respiratory failure.  Patient was obtunded lethargic.  She was able to squeeze the physician's hands but unable to open her eyes.  Difficult to evaluate NIH stroke scale due to altered mental status.  Afebrile heart rate 112 blood pressure 148/74.  Arousable to verbal stimuli.  Nonverbal.  Pupils responsive reactive.  Baseline left-sided weakness.  Brain attack was called.  Patient was taken to CT head and CT angiogram of the head and neck.  Glucose 153.  pH 7.45 pCO2 45 pO2 94.  Prior to this she was oriented x 2 and slightly confused.  Failed a swallowing evaluation.  She required sternal rub to arouse her.    Came in from a nursing facility initially.  She had influenza a diagnosed 2/20/2025.  She has had multiple strokes in the past October 2023 left side hemiplegia mild aortic stenosis pulmonary hypertension carotid artery disease intracranial atherosclerotic disease cognitive impairment for communication deficit chronic kidney disease non-small cell lung cancer-diagnosed 2019-had received chemotherapy and radiation therapy  Previous smoker  Pulmonary embolism  Chronic obstructive lung disease.  QT prolongation.  Type 2 diabetes  Hypertension  Hyperlipidemia  Anxiety, depression, contracture left elbow.  Dysphagia  Spinal stenosis gait difficulty neuropathy fall risk.  When she came in she was lethargic but arousable.  Able to give her name and date of birth.  [facility] and then said she was at the [facility].  Slow speech.    Note stated that she was adopted.  Initial pulse was 120 bpm.    WBC 17K.  Hemoglobin 12.  Platelet count 225,000  Glucose 290-elevated.  Sodium 136 potassium 4.3  BUN 27 creatinine 1.3  Calcium 10.8  Albumin 3.5  Lactate 1.2    Medications--baclofen and gabapentin, Valium were stopped  She is on aspirin atorvastatin ceftriaxone furosemide  Lurasidone 120 mg  metoprolol Tamiflu vortioxetine    Examination:    Neurologic exam:  Mental status : Nonverbal.  Alert.  Follows some commands.  Protruded tongue to command.  Did not track with her eyes.  Cranial nerves: cranial nerves were examined.  Pupils were equal and reactive to light.  External ocular movements could not be evaluated.  Visual fields were normal.-Visual threat stimulus.  Face was symmetric.  Tongue was in the midline.  Soft palate move normally.  Facial sensations were normal.  Hearing was normal.  Neck : supple.  Gait and Station : Not evaluated  Strength : Difficult to evaluate.  She had left hemiplegia but right upper and lower limb strength difficult to evaluate.  She gave me a weak squeeze with the right hand.  Moved her foot and toes on the right spontaneously and to command.  Did not  lift the right upper and lower limbs off the bed to command.  Muscle tone : normal.  Abnormal involuntary movements : none.  Atrophy/fasciculations : none.  Muscle stretch reflexes : normal.  On the left-upper limb and variable right upper limb.  Difficult to obtain reflexes in the lower limbs  Pathologic reflexes : absent.  Sensory : normal.-Felt pinch in all the 4 limbs-grimacing.  Coordination : normal.  Cardiovascular : normal first and second heart sounds.  No murmur.  No cranial or cervical bruits.    Chest : chest expansion was normal.  Breath sounds normal.  No adventitious sounds.    Multiple strokes in the past.  Bilateral parietal.  Significant left hemiplegia.  Recent worsening of mental status-respiratory failure, systemic infection with influenza.  Causing general debility as well as systemic infections can worsen previous disability from strokes.  CT brain did not show any acute infarct.    Supportive treatment  Physical and Occupational Therapy evaluation  Risk factor modification including diabetes, hypertension and hyperlipidemia.    Continue aspirin  Continue statin  She is on ceftriaxone.  Agree with  holding potentially sedating medications and medication that can cause weakness including gabapentin, baclofen, Valium    Differential diagnosis pathogenesis prognosis test results plan discussed with the patient  I communicated with the admitting physician

## 2025-02-23 NOTE — PROGRESS NOTES
Physical Therapy                 Therapy Communication Note    Patient Name: Adela Barrett  MRN: [id number]  Department: [facility] 2  Room: 2105/2105-A  Today's Date: 2/23/2025     Discipline: Physical Therapy    PT Missed Visit: Yes     Missed Visit Reason: Missed Visit Reason: Patient placed on medical hold    Missed Time: Attempt    Comment:PT orders rec'd and chart reviewed. Pt with acute mental status change yesterday. Imaging shows possible aneurysm and neurosurgery consult is pending. Will hold PT eval pending results of consult.

## 2025-02-23 NOTE — CARE PLAN
The patient's goals for the shift include    Problem: Respiratory  Goal: Clear secretions with interventions this shift  Outcome: Progressing  Goal: No signs of respiratory distress (eg. Use of accessory muscles. Peds grunting)  Outcome: Progressing  Goal: Patent airway maintained this shift  Outcome: Progressing  Goal: Tolerate pulmonary toileting this shift  Outcome: Progressing     Problem: Safety - Adult  Goal: Free from fall injury  Outcome: Progressing     Problem: Chronic Conditions and Co-morbidities  Goal: Patient's chronic conditions and co-morbidity symptoms are monitored and maintained or improved  Outcome: Progressing     Problem: Nutrition  Goal: Nutrient intake appropriate for maintaining nutritional needs  Outcome: Progressing     Problem: Pain - Adult  Goal: Verbalizes/displays adequate comfort level or baseline comfort level  Outcome: Progressing     Problem: Discharge Planning  Goal: Discharge to home or other facility with appropriate resources  Outcome: Progressing     Problem: Skin  Goal: Participates in plan/prevention/treatment measures  Outcome: Progressing  Goal: Prevent/manage excess moisture  Outcome: Progressing  Goal: Prevent/minimize sheer/friction injuries  Outcome: Progressing  Goal: Promote/optimize nutrition  Outcome: Progressing  Goal: Promote skin healing  Outcome: Progressing       The clinical goals for the shift include Patient will remain hds by end of shift 2/23/25 by 0700.

## 2025-02-24 ENCOUNTER — APPOINTMENT (OUTPATIENT)
Dept: CARDIOLOGY | Facility: HOSPITAL | Age: 70
End: 2025-02-24
Payer: MEDICARE

## 2025-02-24 ENCOUNTER — APPOINTMENT (OUTPATIENT)
Dept: RADIOLOGY | Facility: HOSPITAL | Age: 70
End: 2025-02-24
Payer: MEDICARE

## 2025-02-24 VITALS
DIASTOLIC BLOOD PRESSURE: 73 MMHG | OXYGEN SATURATION: 96 % | BODY MASS INDEX: 30.69 KG/M2 | WEIGHT: 184.2 LBS | RESPIRATION RATE: 31 BRPM | HEART RATE: 116 BPM | SYSTOLIC BLOOD PRESSURE: 154 MMHG | HEIGHT: 65 IN | TEMPERATURE: 98.1 F

## 2025-02-24 LAB
ANION GAP SERPL CALC-SCNC: 17 MMOL/L (ref 10–20)
AORTIC VALVE MEAN GRADIENT: 10 MMHG
AORTIC VALVE PEAK VELOCITY: 2.05 M/S
ATRIAL RATE: 110 BPM
ATRIAL RATE: 114 BPM
ATRIAL RATE: 166 BPM
AV PEAK GRADIENT: 17 MMHG
AVA (PEAK VEL): 1.87 CM2
AVA (VTI): 1.95 CM2
BUN SERPL-MCNC: 37 MG/DL (ref 6–23)
CALCIUM SERPL-MCNC: 10.6 MG/DL (ref 8.6–10.3)
CARDIAC TROPONIN I PNL SERPL HS: 64 NG/L (ref 0–13)
CHLORIDE SERPL-SCNC: 108 MMOL/L (ref 98–107)
CO2 SERPL-SCNC: 28 MMOL/L (ref 21–32)
CREAT SERPL-MCNC: 1.14 MG/DL (ref 0.5–1.05)
EGFRCR SERPLBLD CKD-EPI 2021: 53 ML/MIN/1.73M*2
EJECTION FRACTION APICAL 4 CHAMBER: 39
EJECTION FRACTION: 53 %
ERYTHROCYTE [DISTWIDTH] IN BLOOD BY AUTOMATED COUNT: 18.6 % (ref 11.5–14.5)
GLUCOSE BLD MANUAL STRIP-MCNC: 147 MG/DL (ref 74–99)
GLUCOSE BLD MANUAL STRIP-MCNC: 165 MG/DL (ref 74–99)
GLUCOSE BLD MANUAL STRIP-MCNC: 185 MG/DL (ref 74–99)
GLUCOSE BLD MANUAL STRIP-MCNC: 196 MG/DL (ref 74–99)
GLUCOSE BLD MANUAL STRIP-MCNC: 229 MG/DL (ref 74–99)
GLUCOSE BLD MANUAL STRIP-MCNC: 281 MG/DL (ref 74–99)
GLUCOSE BLD MANUAL STRIP-MCNC: 293 MG/DL (ref 74–99)
GLUCOSE BLD MANUAL STRIP-MCNC: 302 MG/DL (ref 74–99)
GLUCOSE SERPL-MCNC: 173 MG/DL (ref 74–99)
HCT VFR BLD AUTO: 39.6 % (ref 36–46)
HGB BLD-MCNC: 11.6 G/DL (ref 12–16)
HOLD SPECIMEN: NORMAL
LACTATE SERPL-SCNC: 1.7 MMOL/L (ref 0.4–2)
LEFT VENTRICLE INTERNAL DIMENSION DIASTOLE: 3.32 CM (ref 3.5–6)
LEFT VENTRICULAR OUTFLOW TRACT DIAMETER: 2.26 CM
LV EJECTION FRACTION BIPLANE: 39 %
MAGNESIUM SERPL-MCNC: 2.38 MG/DL (ref 1.6–2.4)
MCH RBC QN AUTO: 26 PG (ref 26–34)
MCHC RBC AUTO-ENTMCNC: 29.3 G/DL (ref 32–36)
MCV RBC AUTO: 89 FL (ref 80–100)
MITRAL VALVE E/A RATIO: 0.8
NRBC BLD-RTO: 0 /100 WBCS (ref 0–0)
P AXIS: 67 DEGREES
P AXIS: 81 DEGREES
P OFFSET: 194 MS
P OFFSET: 196 MS
P ONSET: 141 MS
P ONSET: 141 MS
PLATELET # BLD AUTO: 219 X10*3/UL (ref 150–450)
POTASSIUM SERPL-SCNC: 3.6 MMOL/L (ref 3.5–5.3)
PR INTERVAL: 148 MS
PR INTERVAL: 152 MS
Q ONSET: 215 MS
Q ONSET: 217 MS
Q ONSET: 217 MS
QRS COUNT: 18 BEATS
QRS COUNT: 19 BEATS
QRS COUNT: 25 BEATS
QRS DURATION: 74 MS
QRS DURATION: 82 MS
QRS DURATION: 84 MS
QT INTERVAL: 306 MS
QT INTERVAL: 326 MS
QT INTERVAL: 342 MS
QTC CALCULATION(BAZETT): 449 MS
QTC CALCULATION(BAZETT): 462 MS
QTC CALCULATION(BAZETT): 481 MS
QTC FREDERICIA: 403 MS
QTC FREDERICIA: 414 MS
QTC FREDERICIA: 418 MS
R AXIS: -56 DEGREES
R AXIS: -56 DEGREES
R AXIS: -58 DEGREES
RBC # BLD AUTO: 4.46 X10*6/UL (ref 4–5.2)
RIGHT VENTRICLE FREE WALL PEAK S': 11 CM/S
RIGHT VENTRICLE PEAK SYSTOLIC PRESSURE: 21.8 MMHG
SODIUM SERPL-SCNC: 149 MMOL/L (ref 136–145)
T AXIS: 54 DEGREES
T AXIS: 73 DEGREES
T AXIS: 90 DEGREES
T OFFSET: 370 MS
T OFFSET: 378 MS
T OFFSET: 388 MS
TRICUSPID ANNULAR PLANE SYSTOLIC EXCURSION: 1.4 CM
VENTRICULAR RATE: 110 BPM
VENTRICULAR RATE: 114 BPM
VENTRICULAR RATE: 149 BPM
WBC # BLD AUTO: 9 X10*3/UL (ref 4.4–11.3)

## 2025-02-24 PROCEDURE — 85027 COMPLETE CBC AUTOMATED: CPT | Performed by: PHYSICIAN ASSISTANT

## 2025-02-24 PROCEDURE — 2500000005 HC RX 250 GENERAL PHARMACY W/O HCPCS: Performed by: PHYSICIAN ASSISTANT

## 2025-02-24 PROCEDURE — 93306 TTE W/DOPPLER COMPLETE: CPT | Performed by: INTERNAL MEDICINE

## 2025-02-24 PROCEDURE — 93880 EXTRACRANIAL BILAT STUDY: CPT | Performed by: SURGERY

## 2025-02-24 PROCEDURE — 99232 SBSQ HOSP IP/OBS MODERATE 35: CPT | Performed by: NURSE PRACTITIONER

## 2025-02-24 PROCEDURE — 80048 BASIC METABOLIC PNL TOTAL CA: CPT | Performed by: PHYSICIAN ASSISTANT

## 2025-02-24 PROCEDURE — 94640 AIRWAY INHALATION TREATMENT: CPT

## 2025-02-24 PROCEDURE — 83605 ASSAY OF LACTIC ACID: CPT | Performed by: NURSE PRACTITIONER

## 2025-02-24 PROCEDURE — 93005 ELECTROCARDIOGRAM TRACING: CPT

## 2025-02-24 PROCEDURE — 2500000002 HC RX 250 W HCPCS SELF ADMINISTERED DRUGS (ALT 637 FOR MEDICARE OP, ALT 636 FOR OP/ED): Performed by: PHYSICIAN ASSISTANT

## 2025-02-24 PROCEDURE — 2500000001 HC RX 250 WO HCPCS SELF ADMINISTERED DRUGS (ALT 637 FOR MEDICARE OP): Performed by: PHYSICIAN ASSISTANT

## 2025-02-24 PROCEDURE — 2500000002 HC RX 250 W HCPCS SELF ADMINISTERED DRUGS (ALT 637 FOR MEDICARE OP, ALT 636 FOR OP/ED): Performed by: INTERNAL MEDICINE

## 2025-02-24 PROCEDURE — 2500000004 HC RX 250 GENERAL PHARMACY W/ HCPCS (ALT 636 FOR OP/ED): Performed by: NURSE PRACTITIONER

## 2025-02-24 PROCEDURE — 36415 COLL VENOUS BLD VENIPUNCTURE: CPT

## 2025-02-24 PROCEDURE — 82947 ASSAY GLUCOSE BLOOD QUANT: CPT

## 2025-02-24 PROCEDURE — 99223 1ST HOSP IP/OBS HIGH 75: CPT | Performed by: INTERNAL MEDICINE

## 2025-02-24 PROCEDURE — 74018 RADEX ABDOMEN 1 VIEW: CPT | Performed by: RADIOLOGY

## 2025-02-24 PROCEDURE — 2500000004 HC RX 250 GENERAL PHARMACY W/ HCPCS (ALT 636 FOR OP/ED): Performed by: PHYSICIAN ASSISTANT

## 2025-02-24 PROCEDURE — 2500000001 HC RX 250 WO HCPCS SELF ADMINISTERED DRUGS (ALT 637 FOR MEDICARE OP): Performed by: NURSE PRACTITIONER

## 2025-02-24 PROCEDURE — 84484 ASSAY OF TROPONIN QUANT: CPT

## 2025-02-24 PROCEDURE — 74018 RADEX ABDOMEN 1 VIEW: CPT

## 2025-02-24 PROCEDURE — 36415 COLL VENOUS BLD VENIPUNCTURE: CPT | Performed by: PHYSICIAN ASSISTANT

## 2025-02-24 PROCEDURE — 99222 1ST HOSP IP/OBS MODERATE 55: CPT | Performed by: PHYSICIAN ASSISTANT

## 2025-02-24 PROCEDURE — 2500000005 HC RX 250 GENERAL PHARMACY W/O HCPCS: Performed by: INTERNAL MEDICINE

## 2025-02-24 PROCEDURE — 2060000001 HC INTERMEDIATE ICU ROOM DAILY

## 2025-02-24 PROCEDURE — 93306 TTE W/DOPPLER COMPLETE: CPT

## 2025-02-24 PROCEDURE — 83735 ASSAY OF MAGNESIUM: CPT | Performed by: PHYSICIAN ASSISTANT

## 2025-02-24 PROCEDURE — 93880 EXTRACRANIAL BILAT STUDY: CPT

## 2025-02-24 RX ORDER — METOPROLOL TARTRATE 1 MG/ML
5 INJECTION, SOLUTION INTRAVENOUS ONCE
Status: COMPLETED | OUTPATIENT
Start: 2025-02-24 | End: 2025-02-24

## 2025-02-24 RX ORDER — LIDOCAINE HYDROCHLORIDE 20 MG/ML
1 JELLY TOPICAL ONCE
Status: DISCONTINUED | OUTPATIENT
Start: 2025-02-24 | End: 2025-02-27

## 2025-02-24 RX ORDER — METOPROLOL TARTRATE 1 MG/ML
10 INJECTION, SOLUTION INTRAVENOUS EVERY 6 HOURS
Status: DISCONTINUED | OUTPATIENT
Start: 2025-02-24 | End: 2025-02-24

## 2025-02-24 RX ORDER — HEPARIN SODIUM 10000 [USP'U]/100ML
0-4500 INJECTION, SOLUTION INTRAVENOUS CONTINUOUS
Status: DISCONTINUED | OUTPATIENT
Start: 2025-02-24 | End: 2025-02-24

## 2025-02-24 RX ORDER — METOPROLOL TARTRATE 1 MG/ML
5 INJECTION, SOLUTION INTRAVENOUS EVERY 6 HOURS
Status: DISCONTINUED | OUTPATIENT
Start: 2025-02-24 | End: 2025-02-26

## 2025-02-24 RX ORDER — METOPROLOL TARTRATE 1 MG/ML
2.5 INJECTION, SOLUTION INTRAVENOUS ONCE
Status: COMPLETED | OUTPATIENT
Start: 2025-02-24 | End: 2025-02-24

## 2025-02-24 RX ORDER — ENOXAPARIN SODIUM 100 MG/ML
1 INJECTION SUBCUTANEOUS EVERY 12 HOURS
Status: DISCONTINUED | OUTPATIENT
Start: 2025-02-24 | End: 2025-02-27

## 2025-02-24 RX ORDER — DEXTROSE MONOHYDRATE 50 MG/ML
100 INJECTION, SOLUTION INTRAVENOUS CONTINUOUS
Status: ACTIVE | OUTPATIENT
Start: 2025-02-24 | End: 2025-02-25

## 2025-02-24 RX ORDER — POTASSIUM CHLORIDE 14.9 MG/ML
20 INJECTION INTRAVENOUS
Status: COMPLETED | OUTPATIENT
Start: 2025-02-24 | End: 2025-02-24

## 2025-02-24 RX ORDER — METOPROLOL TARTRATE 1 MG/ML
7.5 INJECTION, SOLUTION INTRAVENOUS ONCE
Status: DISCONTINUED | OUTPATIENT
Start: 2025-02-24 | End: 2025-02-24

## 2025-02-24 RX ADMIN — DEXTROSE MONOHYDRATE 5 MG/HR: 50 INJECTION, SOLUTION INTRAVENOUS at 10:13

## 2025-02-24 RX ADMIN — IPRATROPIUM BROMIDE AND ALBUTEROL SULFATE 3 ML: 2.5; .5 SOLUTION RESPIRATORY (INHALATION) at 22:49

## 2025-02-24 RX ADMIN — FUROSEMIDE 20 MG: 10 INJECTION, SOLUTION INTRAVENOUS at 09:00

## 2025-02-24 RX ADMIN — INSULIN LISPRO 4 UNITS: 100 INJECTION, SOLUTION INTRAVENOUS; SUBCUTANEOUS at 21:03

## 2025-02-24 RX ADMIN — ENOXAPARIN SODIUM 80 MG: 80 INJECTION SUBCUTANEOUS at 10:51

## 2025-02-24 RX ADMIN — ENOXAPARIN SODIUM 80 MG: 80 INJECTION SUBCUTANEOUS at 22:59

## 2025-02-24 RX ADMIN — INSULIN LISPRO 2 UNITS: 100 INJECTION, SOLUTION INTRAVENOUS; SUBCUTANEOUS at 04:40

## 2025-02-24 RX ADMIN — POLYMYXIN B SULFATE AND TRIMETHOPRIM 1 DROP: 10000; 1 SOLUTION OPHTHALMIC at 18:13

## 2025-02-24 RX ADMIN — DEXTROSE MONOHYDRATE 100 ML/HR: 50 INJECTION, SOLUTION INTRAVENOUS at 06:33

## 2025-02-24 RX ADMIN — INSULIN LISPRO 2 UNITS: 100 INJECTION, SOLUTION INTRAVENOUS; SUBCUTANEOUS at 08:06

## 2025-02-24 RX ADMIN — POLYMYXIN B SULFATE AND TRIMETHOPRIM 1 DROP: 10000; 1 SOLUTION OPHTHALMIC at 06:33

## 2025-02-24 RX ADMIN — POLYVINYL ALCOHOL, POVIDONE 1 DROP: 14; 6 SOLUTION/ DROPS OPHTHALMIC at 21:03

## 2025-02-24 RX ADMIN — POLYMYXIN B SULFATE AND TRIMETHOPRIM 1 DROP: 10000; 1 SOLUTION OPHTHALMIC at 01:46

## 2025-02-24 RX ADMIN — POLYMYXIN B SULFATE AND TRIMETHOPRIM 1 DROP: 10000; 1 SOLUTION OPHTHALMIC at 21:04

## 2025-02-24 RX ADMIN — METOPROLOL TARTRATE 5 MG: 5 INJECTION INTRAVENOUS at 00:08

## 2025-02-24 RX ADMIN — INSULIN LISPRO 6 UNITS: 100 INJECTION, SOLUTION INTRAVENOUS; SUBCUTANEOUS at 23:56

## 2025-02-24 RX ADMIN — Medication 12 L/MIN: at 23:38

## 2025-02-24 RX ADMIN — AMIODARONE HYDROCHLORIDE 1 MG/MIN: 1.8 INJECTION, SOLUTION INTRAVENOUS at 12:16

## 2025-02-24 RX ADMIN — AMIODARONE HYDROCHLORIDE 0.5 MG/MIN: 1.8 INJECTION, SOLUTION INTRAVENOUS at 18:11

## 2025-02-24 RX ADMIN — METOPROLOL TARTRATE 5 MG: 5 INJECTION INTRAVENOUS at 18:18

## 2025-02-24 RX ADMIN — METOPROLOL TARTRATE 5 MG: 5 INJECTION INTRAVENOUS at 11:23

## 2025-02-24 RX ADMIN — METOPROLOL TARTRATE 5 MG: 5 INJECTION INTRAVENOUS at 23:56

## 2025-02-24 RX ADMIN — PANTOPRAZOLE SODIUM 40 MG: 40 INJECTION, POWDER, FOR SOLUTION INTRAVENOUS at 06:33

## 2025-02-24 RX ADMIN — CEFTRIAXONE 2 G: 2 INJECTION, POWDER, FOR SOLUTION INTRAMUSCULAR; INTRAVENOUS at 04:40

## 2025-02-24 RX ADMIN — HEPARIN SODIUM 5000 UNITS: 5000 INJECTION, SOLUTION INTRAVENOUS; SUBCUTANEOUS at 04:40

## 2025-02-24 RX ADMIN — Medication 4 L/MIN: at 19:49

## 2025-02-24 RX ADMIN — POLYVINYL ALCOHOL, POVIDONE 1 DROP: 14; 6 SOLUTION/ DROPS OPHTHALMIC at 10:50

## 2025-02-24 RX ADMIN — INSULIN LISPRO 8 UNITS: 100 INJECTION, SOLUTION INTRAVENOUS; SUBCUTANEOUS at 12:30

## 2025-02-24 RX ADMIN — Medication 6 L/MIN: at 22:49

## 2025-02-24 RX ADMIN — METOPROLOL TARTRATE 5 MG: 5 INJECTION INTRAVENOUS at 06:33

## 2025-02-24 RX ADMIN — POLYMYXIN B SULFATE AND TRIMETHOPRIM 1 DROP: 10000; 1 SOLUTION OPHTHALMIC at 10:45

## 2025-02-24 RX ADMIN — METOPROLOL TARTRATE 5 MG: 5 INJECTION INTRAVENOUS at 08:51

## 2025-02-24 RX ADMIN — POTASSIUM CHLORIDE 20 MEQ: 14.9 INJECTION, SOLUTION INTRAVENOUS at 08:06

## 2025-02-24 RX ADMIN — POTASSIUM CHLORIDE 20 MEQ: 14.9 INJECTION, SOLUTION INTRAVENOUS at 10:13

## 2025-02-24 RX ADMIN — DEXTROSE MONOHYDRATE 100 ML/HR: 50 INJECTION, SOLUTION INTRAVENOUS at 18:13

## 2025-02-24 RX ADMIN — METOPROLOL TARTRATE 2.5 MG: 5 INJECTION, SOLUTION INTRAVENOUS at 11:45

## 2025-02-24 RX ADMIN — NYSTATIN 500000 UNITS: 100000 SUSPENSION ORAL at 16:13

## 2025-02-24 RX ADMIN — AMIODARONE HYDROCHLORIDE 150 MG: 1.5 INJECTION, SOLUTION INTRAVENOUS at 11:56

## 2025-02-24 RX ADMIN — INSULIN LISPRO 6 UNITS: 100 INJECTION, SOLUTION INTRAVENOUS; SUBCUTANEOUS at 16:19

## 2025-02-24 ASSESSMENT — PAIN SCALES - GENERAL
PAINLEVEL_OUTOF10: 0 - NO PAIN

## 2025-02-24 ASSESSMENT — PAIN - FUNCTIONAL ASSESSMENT
PAIN_FUNCTIONAL_ASSESSMENT: 0-10

## 2025-02-24 ASSESSMENT — COGNITIVE AND FUNCTIONAL STATUS - GENERAL
TURNING FROM BACK TO SIDE WHILE IN FLAT BAD: TOTAL
MOVING FROM LYING ON BACK TO SITTING ON SIDE OF FLAT BED WITH BEDRAILS: TOTAL
CLIMB 3 TO 5 STEPS WITH RAILING: TOTAL
TOILETING: TOTAL
DAILY ACTIVITIY SCORE: 6
STANDING UP FROM CHAIR USING ARMS: TOTAL
PERSONAL GROOMING: TOTAL
DRESSING REGULAR LOWER BODY CLOTHING: TOTAL
EATING MEALS: TOTAL
DRESSING REGULAR UPPER BODY CLOTHING: TOTAL
MOBILITY SCORE: 6
MOVING TO AND FROM BED TO CHAIR: TOTAL
HELP NEEDED FOR BATHING: TOTAL
WALKING IN HOSPITAL ROOM: TOTAL

## 2025-02-24 NOTE — ASSESSMENT & PLAN NOTE
Influenza A patient continued to be n.p.o. related to poor mentation [facility]  about her poor prognosis  Urinary tract infection continue with antibiotics  Tachycardia will order EKG rule out atrial fibrillation increase beta-blocker dose  Acute kidney injury and hypernatremia increase IV fluid

## 2025-02-24 NOTE — PROGRESS NOTES
Occupational Therapy                 Therapy Communication Note    Patient Name: Adela Barrett  MRN: [id number]  Department: [facility] 2  Room: 2105/2105-A  Today's Date: 2/24/2025     Discipline: Occupational Therapy          Missed Visit Reason:  OT orders received and chart reviewed. Awaiting neuro and neurosurg consult. Per MD notes pt. Minimally responsive with poor prognosis. Will hold and attempt again as medically appropriate.     Missed Time: Attempt    Comment:

## 2025-02-24 NOTE — PROGRESS NOTES
Physical Therapy                 Therapy Communication Note    Patient Name: Adela Barrett  MRN: [id number]  Today's Date: 2/24/2025     Discipline: Physical Therapy    Missed Visit Reason: PT order received, chart reviewed. Pt is minimally responsive at this time and not appropriate for therapy. Will hold PT evaluation and reattempt as appropriate.     Missed Time: Attempt

## 2025-02-24 NOTE — CONSULTS
[facility] Cardiology In-patient Consult Note    Consulting Cardiologist: Alex Schmidt MD  Primary Cardiologist:    Reason for Consult: Atrial fibrillation    Assessment/Plan:    Persistent atrial fibrillation: As best I can tell this is first episode of atrial fibrillation.  She was in sinus rhythm when she presented to the hospital so we know the onset time.  She is anticoagulated with full dose enoxaparin.  Given the signs of poor perfusion I recommended IV amiodarone in an attempt to chemically cardiovert patient.  This worked.  Recommend continue IV amiodarone for at least 24 hours.  Will need to determine whether or not she can consistently take oral amiodarone moving forward.  Okay to continue enoxaparin for now and can determine long-term oral anticoagulation options at a later date.  2.  Chronic combined systolic and diastolic heart failure: Last echo assessment for EF was apparently 2023 showing moderate LV systolic dysfunction EF 35-40%.  At that time had moderate left ventricular diastolic dysfunction.  There were wall motion abnormalities consistent with possible coronary artery disease.  BNP is only mildly elevated.  Serum sodium is elevated suggesting probably some free water deficit.  No overt signs of failure at this point.  Chest x-ray without obvious pulmonary edema.  Patient is on small dose of IV furosemide.  Okay to continue this to try to maintain euvolemia given she is on chronic oral furosemide.  3.  Pulmonary embolism is in her past medical history unclear why she may not be on chronic oral anticoagulation other than potential for intracranial hemorrhage given prior cerebral infarction.      History Of Present Illness:    Adela Barrett is a 69 y.o. female presenting with respiratory failure I believe positive flu and mental status changes.  She was originally in sinus rhythm when presenting but has converted to atrial fibrillation with rapid ventricular spots while here in the  hospital.  She was quite tachycardic this morning despite IV diltiazem and IV metoprolol.  I recommended amiodarone given signs of poor skin perfusion as reported by nursing with skin mottling.  She is nonverbal at this point and perhaps responding with her eyes to verbal and tactile stimuli.       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, Anxiety and depression, Bipolar 1 disorder (Multi), Calculus of gallbladder without cholecystitis, Carotid artery disease (CMS-[facility]), Carpal tunnel syndrome, Cervical spondylosis, CHF (congestive heart failure), CKD (chronic kidney disease), CKD (chronic kidney disease), stage II, Contracture of left elbow, COPD (chronic obstructive pulmonary disease) (Multi), CVA (cerebral vascular accident) (Multi), Diabetes mellitus (Multi), 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    Past Surgical History:  She has a past surgical history that includes Carpal tunnel release (Left); Tonsillectomy; Cataract extraction; Breast surgery; and NM injection.    Problem List:  Patient Active Problem List   Diagnosis    Sepsis with encephalopathy without septic shock, due to unspecified organism (Multi)    UTI (urinary tract infection)    Influenza A    Acute hypoxic respiratory failure (Multi)    History of COPD    (HFpEF) heart failure with preserved ejection fraction    Pulmonary HTN (Multi)    AS (aortic  stenosis)    Altered mental status    Elevated troponin I level       Social History:  She reports that she has quit smoking. Her smoking use included cigarettes. She does not have any smokeless tobacco history on file. She reports that she does not currently use alcohol. She reports that she does not use drugs.    Family History:  Family History   Adopted: Yes   Family history unknown: Yes        Allergies:  Bee venom protein (honey bee), Carisoprodol, Diclofenac, Lamotrigine, Metformin, and Venom-wasp    Inpatient Medications:  Scheduled medications   Medication Dose Route Frequency    aspirin  81 mg oral Daily    atorvastatin  80 mg oral Nightly    [Held by provider] baclofen  5 mg oral BID    cefTRIAXone  2 g intravenous q24h    cholecalciferol  2,000 Units oral Daily    enoxaparin  1 mg/kg subcutaneous q12h    furosemide  20 mg intravenous Daily    [Held by provider] furosemide  20 mg oral Daily    [Held by provider] gabapentin  100 mg oral BID    insulin lispro  0-10 Units subcutaneous q4h    ipratropium-albuteroL  3 mL nebulization TID    lidocaine  1 Application Topical Once    lubricating eye drops  1 drop Both Eyes BID    lurasidone  120 mg oral Daily with breakfast    metoprolol  5 mg intravenous q6h    [Held by provider] metoprolol tartrate  25 mg oral BID    nystatin  5 mL Swish & Swallow 4x daily    oseltamivir  30 mg oral BID    pantoprazole  40 mg oral Daily before breakfast    Or    pantoprazole  40 mg intravenous Daily before breakfast    polymyxin B sulf-trimethoprim  1 drop Both Eyes q4h DEMOND    potassium chloride CR  10 mEq oral Daily    vortioxetine  20 mg oral Daily     PRN medications   Medication    acetaminophen    Or    acetaminophen    albuterol    alum-mag hydroxide-simeth    bisacodyl    dextrose    dextrose    [Held by provider] diazePAM    glucagon    glucagon    [Held by provider] ipratropium    magnesium hydroxide    melatonin    ondansetron ODT    Or    ondansetron    oxygen     polyethylene glycol     Continuous Medications   Medication Dose Last Rate    amiodarone  1 mg/min 1 mg/min (02/24/25 1216)    Followed by    amiodarone  0.5 mg/min      dextrose 5%  100 mL/hr 100 mL/hr (02/24/25 0633)       Outpatient Medications:  Current Outpatient Medications   Medication Instructions    acetaminophen (TYLENOL 8 HOUR) 650 mg, Every 6 hours PRN    albuterol 2.5 mg, nebulization, Every 6 hours PRN    alum-mag hydroxide-simeth (Mylanta) 200-200-20 mg/5 mL oral suspension 30 mL, Every 6 hours PRN    aspirin 81 mg, Daily    atorvastatin (LIPITOR) 80 mg, Nightly    baclofen (LIORESAL) 5 mg, 2 times daily    bisacodyl (DULCOLAX) 10 mg, Daily PRN    cholecalciferol (VITAMIN D-3) 50 mcg, Daily    diazePAM (VALIUM) 10 mg, Every 8 hours PRN    furosemide (LASIX) 20 mg, Daily    gabapentin (NEURONTIN) 100 mg, 2 times daily    glimepiride (AMARYL) 4 mg, Daily before breakfast    ipratropium (Atrovent) 42 mcg (0.06 %) nasal spray 2 sprays, Every 6 hours PRN    loperamide (IMODIUM) 2 mg, 4 times daily PRN    lurasidone (LATUDA) 120 mg, Daily with breakfast    magnesium hydroxide (Milk of Magnesia) 400 mg/5 mL suspension 30 mL, Daily PRN    nitroglycerin (NITROSTAT) 0.4 mg, Every 5 min PRN    ondansetron (ZOFRAN) 4 mg, Every 6 hours PRN    pantoprazole (PROTONIX) 40 mg, Daily before breakfast    polyethylene glycol (GLYCOLAX, MIRALAX) 17 g, Daily PRN    potassium chloride CR 10 mEq ER tablet 10 mEq, Daily    SITagliptin phosphate (JANUVIA) 25 mg, Daily    umeclidinium-vilanteroL (Anoro Ellipta) 62.5-25 mcg/actuation blister with device 1 puff, inhalation, Daily    vortioxetine (TRINTELLIX) 20 mg, Daily       Last Recorded Vitals:  Vitals:    02/24/25 0800 02/24/25 0900 02/24/25 1000 02/24/25 1200   BP: 145/80 146/62 140/74 123/62   Pulse: (!) 176 (!) 160 (!) 168 (!) 160   Resp: (!) 32 (!) 27 (!) 30 (!) 33   Temp:       TempSrc:       SpO2: 96% 96% 98% 98%   Weight:       Height:         Last I/O:  I/O last 3  completed shifts:  In: 150 (1.8 mL/kg) [IV Piggyback:150]  Out: 1150 (13.8 mL/kg) [Urine:1150 (0.4 mL/kg/hr)]  Weight: 83.6 kg     Physical Exam  Vitals reviewed.   Constitutional:       Appearance: She is ill-appearing.      Comments: Nonresponsive to verbal and tactile stimuli with the exception of subtle eye movements   Eyes:      Pupils: Pupils are equal, round, and reactive to light.   Neck:      Vascular: No JVD.   Cardiovascular:      Rate and Rhythm: Normal rate and regular rhythm.      Pulses: Normal pulses.      Heart sounds: Heart sounds are distant. No murmur heard.     No gallop.      Comments: At the time of my exam patient had already converted to sinus rhythm.    Trace bilateral pedal edema.  Pulmonary:      Effort: No respiratory distress.      Breath sounds: Examination of the right-lower field reveals decreased breath sounds. Examination of the left-lower field reveals decreased breath sounds. Decreased breath sounds present. No wheezing or rales.   Abdominal:      General: Abdomen is flat. There is no distension.      Palpations: Abdomen is soft.   Musculoskeletal:         General: No swelling.      Right lower leg: No edema.      Left lower leg: No edema.   Neurological:      General: No focal deficit present.      Mental Status: She is lethargic.   Psychiatric:         Mood and Affect: Mood normal.            Last Labs:  CBC - 2/24/2025:  5:19 AM  9.0 11.6 219    39.6      CMP - 2/24/2025:  5:19 AM  10.6 6.6 18 --- 0.5   _ 3.1 18 73      Troponin I, High Sensitivity   Date/Time Value Ref Range Status   02/22/2025 05:06 PM 39 (H) 0 - 13 ng/L Final   02/22/2025 05:10 AM 38 (H) 0 - 13 ng/L Final   02/21/2025 07:21 PM 43 (H) 0 - 13 ng/L Final     BNP   Date/Time Value Ref Range Status   02/21/2025 11:17  (H) 0 - 99 pg/mL Final     Hemoglobin A1C   Date/Time Value Ref Range Status   04/30/2024 08:41 AM 6.4 (H) 4.3 - 5.6 % Final     Comment:     American Diabetes Association guidelines indicate  that patients with HgbA1c in the range 5.7-6.4% are at increased risk for development of diabetes, and intervention by lifestyle modification may be beneficial. HgbA1c greater or equal to 6.5% is considered diagnostic of diabetes.   11/08/2023 08:05 AM 7.6 (H) 4.3 - 5.6 % Final     Comment:     American Diabetes Association guidelines indicate that patients with HgbA1c in the range 5.7-6.4% are at increased risk for development of diabetes, and intervention by lifestyle modification may be beneficial. HgbA1c greater or equal to 6.5% is considered diagnostic of diabetes.       EKG:   Encounter Date: 02/21/25   Electrocardiogram, 12-lead PRN ACS symptoms   Result Value    Ventricular Rate 149    Atrial Rate 166    QRS Duration 82    QT Interval 306    QTC Calculation(Bazett) 481    R Axis -58    T Axis 90    QRS Count 25    Q Onset 217    T Offset 370    QTC Fredericia 414    Narrative    ** Poor data quality, interpretation may be adversely affected  Atrial fibrillation with rapid ventricular response with premature ventricular or aberrantly conducted complexes  Left axis deviation  Inferior infarct (cited on or before 21-FEB-2025)  Anterior infarct , age undetermined  Abnormal ECG  When compared with ECG of 23-FEB-2025 03:36, (unconfirmed)  Atrial fibrillation has replaced Sinus rhythm  Anterior infarct is now Present     ECHO:    CT Results:  No results found for this or any previous visit from the past 365 days.          Alex Schmidt MD

## 2025-02-24 NOTE — PROGRESS NOTES
"Subjective  Patient still not doing well minimally responsive  Nursing staff was interviewed  Objectives    Last Recorded Vitals  Blood pressure 145/69, pulse (!) 176, temperature 36.9 °C (98.4 °F), temperature source Temporal, resp. rate (!) 32, height 1.651 m (5' 5\"), weight 83.6 kg (184 lb 4.9 oz), SpO2 96%.    Physical Exam  HENT:      Right Ear: External ear normal.      Left Ear: External ear normal.      Mouth/Throat:      Mouth: Mucous membranes are moist.   Cardiovascular:      Rate and Rhythm: Normal rate and regular rhythm.      Heart sounds: No murmur heard.     No friction rub. No gallop.   Pulmonary:      Effort: No accessory muscle usage or respiratory distress.      Breath sounds: No stridor. No wheezing or rhonchi.   Chest:      Chest wall: No tenderness.   Abdominal:      General: There is no distension.      Palpations: There is no mass.      Tenderness: There is no abdominal tenderness. There is no guarding or rebound.   Musculoskeletal:         General: No deformity or signs of injury.      Cervical back: No rigidity or tenderness. Normal range of motion.      Right lower leg: No edema.      Left lower leg: No edema.   Skin:     Coloration: Skin is not jaundiced or pale.      Findings: No lesion.   Neurological:   Lethargic confused     Labs    No results displayed because visit has over 200 results.            Imaging     XR chest 1 view  Narrative: Interpreted By:  Tyler Concepcion,   STUDY:  XR CHEST 1 VIEW; 2/23/2025 4:08 am      INDICATION:  CLINICAL INFORMATION: Signs/Symptoms:influenza, worsening hypoxia.      COMPARISON:  02/21/2025      ACCESSION NUMBER(S):  [id number]      ORDERING CLINICIAN:  JAKI FIELD      TECHNIQUE:  Portable chest one view.      FINDINGS:  The cardiac size is indeterminate in view of the AP projection.  There is prominence of the left hilum for which CT chest is  recommended to rule out a left hilar mass. No infiltrates or  effusions are identified.  Left " basilar atelectasis is present.      Impression: 1. Questionable left hilar mass for which CT chest with intravenous  contrast is recommended for more definitive assessment.  2. Left basilar subsegmental atelectasis.      MACRO:  none      Signed by: Tyler Concepcion 2/23/2025 7:08 AM  Dictation workstation:   [id number]       Patient Active Problem List   Diagnosis    Sepsis with encephalopathy without septic shock, due to unspecified organism (Multi)    UTI (urinary tract infection)    Influenza A    Acute hypoxic respiratory failure (Multi)    History of COPD    (HFpEF) heart failure with preserved ejection fraction    Pulmonary HTN (Multi)    AS (aortic stenosis)    Altered mental status    Elevated troponin I level         Assessment/Plan   Assessment & Plan  Sepsis with encephalopathy without septic shock, due to unspecified organism (Multi)    UTI (urinary tract infection)    Influenza A    Acute hypoxic respiratory failure (Multi)    History of COPD    (HFpEF) heart failure with preserved ejection fraction    Pulmonary HTN (Multi)    AS (aortic stenosis)    Altered mental status    Elevated troponin I level  Influenza A patient continued to be n.p.o. related to poor mentation plan to place [employer] to connect with power of  about her poor prognosis  Urinary tract infection continue with antibiotics  Tachycardia will order EKG rule out atrial fibrillation increase beta-blocker dose  Acute kidney injury and hypernatremia increase IV fluid           I spent 25 minutes in the professional and overall care of this patient.      CHIN Mercer MD

## 2025-02-24 NOTE — PROGRESS NOTES
"Adela Barrett is a 69 y.o. female on day 3 of admission presenting with Sepsis with encephalopathy without septic shock, due to unspecified organism (Multi).      Subjective   Remains obtunded.  Opens eyes to tactile stimulation but does not track or follow commands.  Started on Lovenox and Cardizem for A-fib with RVR.       Objective     Last Recorded Vitals  Blood pressure 123/62, pulse (!) 160, temperature 36.9 °C (98.4 °F), temperature source Temporal, resp. rate (!) 33, height 1.651 m (5' 5\"), weight 83.6 kg (184 lb 4.9 oz), SpO2 98%.    Physical Exam  Eyes:      Pupils: Pupils are equal, round, and reactive to light.       Neurological Exam  Mental Status  Arousable to tactile stimuli. Oriented only to person.  Obtunded.  Opens eyes, does not track.  Not following commands..    Cranial Nerves  CN III, IV, VI: Pupils equal round and reactive to light bilaterally.  Face appears symmetrical..    Motor    Localizes to noxious stimulation on right side.  Left sided hemiplegia .    Relevant Results  Scheduled medications  aspirin, 81 mg, oral, Daily  atorvastatin, 80 mg, oral, Nightly  [Held by provider] baclofen, 5 mg, oral, BID  cefTRIAXone, 2 g, intravenous, q24h  cholecalciferol, 2,000 Units, oral, Daily  enoxaparin, 1 mg/kg, subcutaneous, q12h  furosemide, 20 mg, intravenous, Daily  [Held by provider] furosemide, 20 mg, oral, Daily  [Held by provider] gabapentin, 100 mg, oral, BID  insulin lispro, 0-10 Units, subcutaneous, q4h  ipratropium-albuteroL, 3 mL, nebulization, TID  lidocaine, 1 Application, Topical, Once  lubricating eye drops, 1 drop, Both Eyes, BID  lurasidone, 120 mg, oral, Daily with breakfast  metoprolol, 5 mg, intravenous, q6h  [Held by provider] metoprolol tartrate, 25 mg, oral, BID  nystatin, 5 mL, Swish & Swallow, 4x daily  oseltamivir, 30 mg, oral, BID  pantoprazole, 40 mg, oral, Daily before breakfast   Or  pantoprazole, 40 mg, intravenous, Daily before breakfast  polymyxin B " sulf-trimethoprim, 1 drop, Both Eyes, q4h DEMOND  potassium chloride CR, 10 mEq, oral, Daily  vortioxetine, 20 mg, oral, Daily      Continuous medications  amiodarone, 1 mg/min, Last Rate: 1 mg/min (02/24/25 1216)   Followed by  amiodarone, 0.5 mg/min  dextrose 5%, 100 mL/hr, Last Rate: 100 mL/hr (02/24/25 0633)      PRN medications  PRN medications: acetaminophen **OR** acetaminophen, albuterol, alum-mag hydroxide-simeth, bisacodyl, dextrose, dextrose, [Held by provider] diazePAM, glucagon, glucagon, [Held by provider] ipratropium, magnesium hydroxide, melatonin, ondansetron ODT **OR** ondansetron, oxygen, polyethylene glycol  Results for orders placed or performed during the hospital encounter of 02/21/25 (from the past 24 hours)   POCT GLUCOSE   Result Value Ref Range    POCT Glucose 185 (H) 74 - 99 mg/dL   POCT GLUCOSE   Result Value Ref Range    POCT Glucose 152 (H) 74 - 99 mg/dL   Blood Gas Arterial   Result Value Ref Range    POCT pH, Arterial 7.47 (H) 7.38 - 7.42 pH    POCT pCO2, Arterial 43 (H) 38 - 42 mm Hg    POCT pO2, Arterial 79 (L) 85 - 95 mm Hg    POCT SO2, Arterial 98 94 - 100 %    POCT Oxy Hemoglobin, Arterial 95.2 94.0 - 98.0 %    POCT Base Excess, Arterial 6.8 (H) -2.0 - 3.0 mmol/L    POCT HCO3 Calculated, Arterial 31.3 (H) 22.0 - 26.0 mmol/L    Patient Temperature      FiO2 40 %    Apparatus CANNULA     Site of Arterial Puncture Radial Right     Doron's Test Positive    POCT GLUCOSE   Result Value Ref Range    POCT Glucose 147 (H) 74 - 99 mg/dL   POCT GLUCOSE   Result Value Ref Range    POCT Glucose 165 (H) 74 - 99 mg/dL   CBC   Result Value Ref Range    WBC 9.0 4.4 - 11.3 x10*3/uL    nRBC 0.0 0.0 - 0.0 /100 WBCs    RBC 4.46 4.00 - 5.20 x10*6/uL    Hemoglobin 11.6 (L) 12.0 - 16.0 g/dL    Hematocrit 39.6 36.0 - 46.0 %    MCV 89 80 - 100 fL    MCH 26.0 26.0 - 34.0 pg    MCHC 29.3 (L) 32.0 - 36.0 g/dL    RDW 18.6 (H) 11.5 - 14.5 %    Platelets 219 150 - 450 x10*3/uL   Basic Metabolic Panel   Result  Value Ref Range    Glucose 173 (H) 74 - 99 mg/dL    Sodium 149 (H) 136 - 145 mmol/L    Potassium 3.6 3.5 - 5.3 mmol/L    Chloride 108 (H) 98 - 107 mmol/L    Bicarbonate 28 21 - 32 mmol/L    Anion Gap 17 10 - 20 mmol/L    Urea Nitrogen 37 (H) 6 - 23 mg/dL    Creatinine 1.14 (H) 0.50 - 1.05 mg/dL    eGFR 53 (L) >60 mL/min/1.73m*2    Calcium 10.6 (H) 8.6 - 10.3 mg/dL   Magnesium   Result Value Ref Range    Magnesium 2.38 1.60 - 2.40 mg/dL   POCT GLUCOSE   Result Value Ref Range    POCT Glucose 196 (H) 74 - 99 mg/dL   Electrocardiogram, 12-lead PRN ACS symptoms   Result Value Ref Range    Ventricular Rate 149 BPM    Atrial Rate 166 BPM    QRS Duration 82 ms    QT Interval 306 ms    QTC Calculation(Bazett) 481 ms    R Axis -58 degrees    T Axis 90 degrees    QRS Count 25 beats    Q Onset 217 ms    T Offset 370 ms    QTC Fredericia 414 ms   Troponin I, High Sensitivity   Result Value Ref Range    Troponin I, High Sensitivity 64 (HH) 0 - 13 ng/L   Lactate   Result Value Ref Range    Lactate 1.7 0.4 - 2.0 mmol/L   POCT GLUCOSE   Result Value Ref Range    POCT Glucose 302 (H) 74 - 99 mg/dL     Carotid duplex bilateral    Result Date: 2/24/2025            [facility] [address] [place] [address]     Tel [phone] Fax [phone]  Vascular Lab Report  VASC US CAROTID ARTERY DUPLEX BILATERAL Patient Name:     DORIE Salamanca Physician: [id number] Mauro Dutta MD Study Date:       2/24/2025          Ordering Provider: [id number] IZABEL ALONZO MRN/PID:          [id number]           Fellow: Accession#:       [id number]       Technologist:      Danielle Queen RVT Date of           1955 / 69      Technologist 2: Birth/Age:        years Gender:           F                  Encounter#:        [id number] Admission Status: Inpatient          Location           [facility]                                       Performed:  Diagnosis/ICD: Other specified symptoms and signs involving the circulatory and                respiratory systems-R09.89 Indication:    altered mental status CPT Codes:     61097 Cerebrovascular Carotid Duplex scan complete  Pertinent History: CTA states possible pseudo aneurysm vs ulcerative plaque at                    right ICA just distal to bifurcation.  CONCLUSIONS: Right Carotid: Findings are consistent with less than 50% stenosis of the right proximal internal carotid artery. Laminar flow seen by color Doppler. Possible ulcerative plaque vs. bulb flow noted at proximal ICA. Right external carotid artery appears patent with no evidence of stenosis. No evidence of hemodynamically significant stenosis of the right common carotid artery. The right vertebral artery is patent with antegrade flow. No evidence of hemodynamically significant stenosis in the right subclavian artery. Left Carotid: Findings are consistent with less than 50% stenosis of the left proximal internal carotid artery. Laminar flow seen by color Doppler. Left external carotid artery appears patent with no evidence of stenosis. No evidence of hemodynamically significant stenosis of the left common carotid artery. The left vertebral artery is patent with antegrade flow. No evidence of hemodynamically significant stenosis in the left subclavian artery. Additional Findings: Technically difficult exam due to body habitus/ patient positioning/ labored breathing. Irregular heart rate/ rhythm noted throughout exam.  Imaging & Doppler Findings: Right Plaque Morph: The proximal right internal carotid artery demonstrates irregular, calcified and heterogenous plaque. The distal right common carotid artery demonstrates calcified and irregular plaque. Left Plaque Morph: The proximal left internal carotid artery demonstrates heterogenous, calcified and irregular plaque. The mid left internal carotid artery  demonstrates irregular, calcified and heterogenous plaque. The proximal left external carotid artery demonstrates calcified plaque. The mid left common carotid artery demonstrates intimal thickening plaque. The distal left common carotid artery demonstrates intimal thickening plaque.   Right                       Left   PSV      EDV                PSV     EDV 64 cm/s  8 cm/s    CCA P    77 cm/s 10 cm/s 49 cm/s  12 cm/s   CCA D    50 cm/s 6 cm/s 81 cm/s  7 cm/s    ICA P    64 cm/s 10 cm/s 47 cm/s  10 cm/s   ICA M    62 cm/s 17 cm/s 50 cm/s  11 cm/s   ICA D    56 cm/s 17 cm/s 42 cm/s  3 cm/s     ECA     60 cm/s 0 cm/s 48 cm/s  9 cm/s  Vertebral  51 cm/s 7 cm/s 100 cm/s 3 cm/s  Subclavian 63 cm/s 9 cm/s                Right Left ICA/CCA Ratio  1.7  1.3   [id number] Mauro Dutta MD Electronically signed by [id number] Mauro Dutta MD on 2/24/2025 at 11:59:13 AM  ** Final **     Electrocardiogram, 12-lead PRN ACS symptoms    Result Date: 2/24/2025   Poor data quality, interpretation may be adversely affected Atrial fibrillation with rapid ventricular response with premature ventricular or aberrantly conducted complexes Left axis deviation Inferior infarct (cited on or before 21-FEB-2025) Anterior infarct , age undetermined Abnormal ECG When compared with ECG of 23-FEB-2025 03:36, (unconfirmed) Atrial fibrillation has replaced Sinus rhythm Anterior infarct is now Present    ECG 12 Lead    Result Date: 2/24/2025  Sinus tachycardia with occasional Premature ventricular complexes Left axis deviation Inferior infarct (cited on or before 21-FEB-2025) Abnormal ECG When compared with ECG of 22-FEB-2025 16:14, (unconfirmed) Fusion complexes are no longer Present Premature ventricular complexes are now Present    ECG 12 Lead    Result Date: 2/24/2025  Sinus tachycardia with Fusion complexes Left axis deviation Pulmonary disease pattern Inferior infarct (cited on or before 21-FEB-2025) Abnormal ECG When compared with ECG of  21-FEB-2025 11:06, (unconfirmed) Fusion complexes are now Present T wave inversion no longer evident in Lateral leads    XR chest 1 view    Result Date: 2/23/2025  Interpreted By:  Tyler Concepcion, STUDY: XR CHEST 1 VIEW; 2/23/2025 4:08 am   INDICATION: CLINICAL INFORMATION: Signs/Symptoms:influenza, worsening hypoxia.   COMPARISON: 02/21/2025   ACCESSION NUMBER(S): [id number]   ORDERING CLINICIAN: JAKI FIELD   TECHNIQUE: Portable chest one view.   FINDINGS: The cardiac size is indeterminate in view of the AP projection. There is prominence of the left hilum for which CT chest is recommended to rule out a left hilar mass. No infiltrates or effusions are identified.  Left basilar atelectasis is present.       1. Questionable left hilar mass for which CT chest with intravenous contrast is recommended for more definitive assessment. 2. Left basilar subsegmental atelectasis.   MACRO: none   Signed by: Tyler Concepcion 2/23/2025 7:08 AM Dictation workstation:   [id number]    CT brain attack angio head and neck W and WO IV contrast    Result Date: 2/22/2025  Interpreted By:  Sunil Hazel and Hooper Grayson STUDY: CT BRAIN ATTACK ANGIO HEAD AND NECK W AND WO IV CONTRAST; CT BRAIN ATTACK HEAD WO IV CONTRAST;  2/22/2025 4:41 pm; 2/22/2025 4:40 pm   INDICATION: Signs/Symptoms:Stroke team; Signs/Symptoms:acute AMS.     COMPARISON: Same day noncontrast head CT.   ACCESSION NUMBER(S): [id number]; [id number]   ORDERING CLINICIAN: BUBBA YANG   TECHNIQUE: Unenhanced brain CT was obtained with coronal and sagittal reformation of. 70 ML; N/A of Omnipaque 350; N/A was administered intravenously and axial images of the head and neck were acquired. Coronal, sagittal, and 3-D reconstructions were provided for review.   FINDINGS: BRAIN CT:   The lateral right parietal lobe has an old area of encephalomalacia, with a smaller area of encephalomalacia in the lateral left parietal lobe unchanged.   The sulci are  dilated in the areas of encephalomalacia. The ventricles and sulci elsewhere are moderately prominent, unchanged.   No hemorrhage, mass or large acute infarct is noted.   Paranasal sinuses and mastoid air cells are normally aerated.     CT ANGIOGRAM:   The main pulmonary artery is dilated to 3.6 cm.     CTA NECK:   AORTIC ARCH:   Minimal eccentric calcified atheromatous plaque observed in the thoracic aortic arch. Normal variant bovine arch noted.   COMMON CAROTID ARTERIES:   Course and caliber of the common carotid arteries bilaterally is unremarkable. No evidence of flow-limiting stenosis or significant atherosclerotic plaque burden.   INTERNAL CAROTID ARTERIES:   Both carotid bulbs demonstrate dense calcifications and atheromatous plaque, right worse than left.   Plaque ulceration/pseudo aneurysm with irregular posterior 4 mm outpouching noted of the posterior RIGHT C1 segment internal carotid artery just distal to the bifurcation. The remainder of the visualized extracranial course of the right internal carotid artery appears normal. Few scattered calcified plaques observed in the extracranial course of the left internal carotid artery.   VERTEBRAL ARTERIES:   Bilateral normal subclavian origins of the vertebral arteries observed. Courses and calibers of the vertebral arteries unremarkable. No flow-limiting stenosis, evidence of dissection, or aneurysm.   CTA HEAD:   The intracranial courses of the bilateral internal carotid arteries are densely calcified in the vicinity of the paraophthalmic and communicating segments of the arteries.   Vertebrobasilar confluence appears normal. Normal appearance of the basilar artery.   [id number]:   P1 and P2 segments of the posterior cerebral arteries appear normal. A1 and A2 segments of the anterior cerebral arteries appear normal. Normal appearance of the anterior communicating artery.   Normal appearances of the M1 and M2 segments of the middle cerebral arteries.    No evidence of large vessel occlusion. No evidence of aneurysm. No significant stenoses are identified.   OTHER OSSEOUS/SOFT TISSUE STRUCTURES:   Paravertebral soft tissue structures normal. No pathologically enlarged or centrally necrotic lymph nodes.   Normal CT assessment of the thyroid. No supraclavicular lymphadenopathy.   The demonstrated upper lung parenchyma is normal.   No acute or aggressive appearing bony abnormality. No acute traumatic findings of the cervical spine.       1. No CT angiographic evidence of large vessel occlusion, flow-limiting stenosis, or true aneurysm of the evaluated great vessels of the head and neck. 2. Question possible ulcerative plaque versus pseudo aneurysm involving the proximal right C1 segment internal carotid artery just distal to the carotid bulb. 3. The main pulmonary artery is dilated to 3.6 cm; this can be seen with pulmonary artery hypertension.   I personally reviewed the images/study and I agree with the findings as stated. This study was interpreted at [place], [place].   MACRO: James Acevedo discussed the significance and urgency of this critical finding by epic secure chat with  BUBBA YANG on 2/22/2025 at 4:56 pm.  (**-RCF-**) Findings:  See findings.   Signed by: Sunil Hazel 2/22/2025 5:08 PM Dictation workstation:   [id number]    CT brain attack head wo IV contrast    Result Date: 2/22/2025  Interpreted By:  Sunil Hazel and Hooper Grayson STUDY: CT BRAIN ATTACK ANGIO HEAD AND NECK W AND WO IV CONTRAST; CT BRAIN ATTACK HEAD WO IV CONTRAST;  2/22/2025 4:41 pm; 2/22/2025 4:40 pm   INDICATION: Signs/Symptoms:Stroke team; Signs/Symptoms:acute AMS.     COMPARISON: Same day noncontrast head CT.   ACCESSION NUMBER(S): [id number]; [id number]   ORDERING CLINICIAN: BUBBA YANG   TECHNIQUE: Unenhanced brain CT was obtained with coronal and sagittal reformation of. 70 ML; N/A of Omnipaque  350; N/A was administered intravenously and axial images of the head and neck were acquired. Coronal, sagittal, and 3-D reconstructions were provided for review.   FINDINGS: BRAIN CT:   The lateral right parietal lobe has an old area of encephalomalacia, with a smaller area of encephalomalacia in the lateral left parietal lobe unchanged.   The sulci are dilated in the areas of encephalomalacia. The ventricles and sulci elsewhere are moderately prominent, unchanged.   No hemorrhage, mass or large acute infarct is noted.   Paranasal sinuses and mastoid air cells are normally aerated.     CT ANGIOGRAM:   The main pulmonary artery is dilated to 3.6 cm.     CTA NECK:   AORTIC ARCH:   Minimal eccentric calcified atheromatous plaque observed in the thoracic aortic arch. Normal variant bovine arch noted.   COMMON CAROTID ARTERIES:   Course and caliber of the common carotid arteries bilaterally is unremarkable. No evidence of flow-limiting stenosis or significant atherosclerotic plaque burden.   INTERNAL CAROTID ARTERIES:   Both carotid bulbs demonstrate dense calcifications and atheromatous plaque, right worse than left.   Plaque ulceration/pseudo aneurysm with irregular posterior 4 mm outpouching noted of the posterior RIGHT C1 segment internal carotid artery just distal to the bifurcation. The remainder of the visualized extracranial course of the right internal carotid artery appears normal. Few scattered calcified plaques observed in the extracranial course of the left internal carotid artery.   VERTEBRAL ARTERIES:   Bilateral normal subclavian origins of the vertebral arteries observed. Courses and calibers of the vertebral arteries unremarkable. No flow-limiting stenosis, evidence of dissection, or aneurysm.   CTA HEAD:   The intracranial courses of the bilateral internal carotid arteries are densely calcified in the vicinity of the paraophthalmic and communicating segments of the arteries.   Vertebrobasilar  confluence appears normal. Normal appearance of the basilar artery.   [id number]:   P1 and P2 segments of the posterior cerebral arteries appear normal. A1 and A2 segments of the anterior cerebral arteries appear normal. Normal appearance of the anterior communicating artery.   Normal appearances of the M1 and M2 segments of the middle cerebral arteries.   No evidence of large vessel occlusion. No evidence of aneurysm. No significant stenoses are identified.   OTHER OSSEOUS/SOFT TISSUE STRUCTURES:   Paravertebral soft tissue structures normal. No pathologically enlarged or centrally necrotic lymph nodes.   Normal CT assessment of the thyroid. No supraclavicular lymphadenopathy.   The demonstrated upper lung parenchyma is normal.   No acute or aggressive appearing bony abnormality. No acute traumatic findings of the cervical spine.       1. No CT angiographic evidence of large vessel occlusion, flow-limiting stenosis, or true aneurysm of the evaluated great vessels of the head and neck. 2. Question possible ulcerative plaque versus pseudo aneurysm involving the proximal right C1 segment internal carotid artery just distal to the carotid bulb. 3. The main pulmonary artery is dilated to 3.6 cm; this can be seen with pulmonary artery hypertension.   I personally reviewed the images/study and I agree with the findings as stated. This study was interpreted at [place], [place].   MACRO: James Acevedo discussed the significance and urgency of this critical finding by epic secure chat with  BUBBA YANG on 2/22/2025 at 4:56 pm.  (**-RCF-**) Findings:  See findings.   Signed by: Sunil Hazel 2/22/2025 5:08 PM Dictation workstation:   [id number]    Electrocardiogram, 12-lead PRN ACS symptoms    Result Date: 2/22/2025  Sinus tachycardia Left axis deviation Inferior infarct , age undetermined Possible Anterior infarct , age undetermined T wave abnormality,  consider lateral ischemia Abnormal ECG No previous ECGs available    CT head wo IV contrast    Result Date: 2/21/2025  Interpreted By:  Al Flores, STUDY: CT HEAD WO IV CONTRAST;  2/21/2025 5:28 pm   INDICATION: Signs/Symptoms:altered mental status.     COMPARISON: None.   ACCESSION NUMBER(S): [id number]   ORDERING CLINICIAN: GEORGES ABEL   TECHNIQUE: Noncontrast axial CT images of head were obtained with coronal and sagittal reconstructed images.   FINDINGS: BRAIN PARENCHYMA: There is diffuse moderate volume loss. There is focal encephalomalacia in the right parietal lobe with surrounding mild gliosis. There is a chronic lacunar infarct in the right basal ganglia. Additional focus of encephalomalacia in the left parietal lobe also noted. There is also patchy chronic ischemic changes in the central enrique. Periventricular and subcortical hemispheric white matter hypodensities are most compatible with chronic small vessel ischemic disease. No acute intraparenchymal hemorrhage or parenchymal evidence of acute large territory ischemic infarct. Gray-white matter distinction is preserved. No mass-effect.   VENTRICLES and EXTRA-AXIAL SPACES:  No acute extra-axial or intraventricular hemorrhage. No effacement of cerebral sulci. The ventricles and sulci are age-concordant.   PARANASAL SINUSES/MASTOIDS:  No hemorrhage or air-fluid levels within the visualized paranasal sinuses. The mastoids are well aerated.   CALVARIUM/ORBITS:  No skull fracture.  The orbits and globes are intact to the extent visualized.   EXTRACRANIAL SOFT TISSUES: No discernible abnormality.       Bilateral chronic parietal lobe ischemic infarct.   Moderate burden of supratentorial and infratentorial chronic small vessel ischemic disease.   No acute intracranial abnormality.   MACRO: None.   Signed by: Al Flores 2/21/2025 6:44 PM Dictation workstation:   [id number]    XR chest 1 view    Result Date: 2/21/2025  Interpreted By:  Nasir  Al, STUDY: XR CHEST 1 VIEW;  2/21/2025 11:33 am   INDICATION: Signs/Symptoms:sepsis, sob.   COMPARISON: None.   ACCESSION NUMBER(S): [id number]   ORDERING CLINICIAN: JACKY LUIS   TECHNIQUE: Single AP portable view of the chest was obtained.   FINDINGS: MEDIASTINUM/ LUNGS/ ANDREI: Cardiomegaly. No blunting of the costophrenic sulci. Patient rotated to the left. Mild central vascular congestion and mild prominence of the interstitium. No abnormal opacity in either lung worrisome for tumor or pneumonia. No pneumothorax. No tracheal deviation. No abnormal hilar fullness or gross mass on either side.   BONES: No lytic or blastic destructive bone lesion.   UPPER ABDOMEN: Grossly intact.       Patient rotated to the left.   Findings suspicious for mild CHF. Clinical correlation needed.   MACRO: None   Signed by: Al Best 2/21/2025 12:04 PM Dictation workstation:   [id number]     Assessment & Plan  Sepsis with encephalopathy without septic shock, due to unspecified organism (Multi)    UTI (urinary tract infection)    Influenza A    Acute hypoxic respiratory failure (Multi)    History of COPD    (HFpEF) heart failure with preserved ejection fraction    Pulmonary HTN (Multi)    AS (aortic stenosis)    Altered mental status    Elevated troponin I level    Metabolic encephalopathy 2/2 sepsis, influenza A, UTI, respiratory failure, DIONNE, cardiac arrhythmia; these conditions likely causing recrudescence of prior stroke. No acute stroke on CT imaging.     Recommend:    Continue supportive care.  Continue aspirin (give rectal if unable to take orally) and statin (when able to take po).    If no improvement of mental status once treated, and if pt can lie flat, may consider MRI brain and EEG.    Case/plan discussed with Dr. Arvizu.  Neurology will follow up as needed.    I spent 55 minutes in the professional and overall care of this patient.      Jeanne Fonseca, APRN-CNP

## 2025-02-24 NOTE — PROGRESS NOTES
"Nutrition Follow Up Assessment:   Nutrition Assessment         Patient is a 69 y.o. female presenting with lethargy at LTC and found to be influenza A positive.    2/24/2025 Follow up/reconsult for tube feeds: Chart reviewed and events noted. Pt was to have MBSS today, 2/24, however pt not medically appropriate. Cardiology on consult as pt in A fib with RVR--on cardizem and amiodarone. Per NP, to place dobhoff today. Note EF 35-40%.    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, mild aortic stenosis, pulmonary HTN, carotid artery disease, intracranial atherosclerosis, cognitive communication deficit, CKD 2, non small cell lung cancer of LLL (Dx 2019, s/p carboplatin/paclitaxel/pembrolizumab and RT), former smoker history,  incidental PE (4/2020), COPD on PRN oxygen (2 liters nasal cannula prn), HFpEF, prolonged QT interval, type II diabetes (Hem A1C 6.4% April 2024), HTN, HLD, Bipolar affective disorder, anxiety with depression, contracture left elbow, oropharyngeal phase dysphagia, spinal stenosis, carpal tunnel syndrome, UTI, pneumonia, difficulty walking, falls, and neuropathy     Nutrition History:  Food and Nutrient History: [facility] indicates pt was on WILL/carb consistent regular texture diet prior to admission.They did note that she typicall eats best at bkfast and eats/drinks very slowly. (Typically takes meds whole in pudding.)  Vitamin/Herbal Supplement Use: januvia, vitD       Anthropometrics:  Height: 165.1 cm (5' 5\")   Weight: 83.6 kg (184 lb 4.9 oz)   BMI (Calculated): 30.67             Weight History:    02/21/25 02/22/25 04:00   Weight 91 kg (200 lb 9.9 oz) 83.9 kg (185 lb)   Discrepancies noted since admission, called SNF to obtain more weights and could only get most recent weight of 218.1# on 2/10/25.  Most other weights in archives are >200#.  Weight Change %:       Nutrition Focused Physical Exam Findings:    Subcutaneous Fat Loss:   Defer Subcutaneous " Fat Loss Assessment: Defer all  Defer All Reason: pt not appropriate at this time  Muscle Wasting:  Defer Muscle Wasting Assessment: Defer all  Defer All Reason: pt not appropriate at this time  Edema:  Edema: +1 trace  Edema Location: BLE  Physical Findings:  Hair: Negative  Eyes: Negative  Nails: Negative  Skin: Negative  Mouth Findings: Dysphagia  Teeth Findings: Edentulous    Nutrition Significant Labs:  BMP Trend:   Results from last 7 days   Lab Units 02/24/25  0519 02/23/25  0556 02/22/25  1706 02/22/25  0510   GLUCOSE mg/dL 173* 130* 153* 208*   CALCIUM mg/dL 10.6* 10.2 9.9 10.3   SODIUM mmol/L 149* 146* 145 139   POTASSIUM mmol/L 3.6 3.9 3.5 3.9   CO2 mmol/L 28 28 29 27   CHLORIDE mmol/L 108* 106 105 102   BUN mg/dL 37* 30* 32* 32*   CREATININE mg/dL 1.14* 1.05 1.21* 1.24*    , A1C:  Lab Results   Component Value Date    HGBA1C 6.4 (H) 04/30/2024       Nutrition Specific Medications:  aspirin, 81 mg, oral, Daily  atorvastatin, 80 mg, oral, Nightly  [Held by provider] baclofen, 5 mg, oral, BID  cefTRIAXone, 2 g, intravenous, q24h  cholecalciferol, 2,000 Units, oral, Daily  enoxaparin, 1 mg/kg, subcutaneous, q12h  furosemide, 20 mg, intravenous, Daily  [Held by provider] furosemide, 20 mg, oral, Daily  [Held by provider] gabapentin, 100 mg, oral, BID  insulin lispro, 0-10 Units, subcutaneous, q4h  ipratropium-albuteroL, 3 mL, nebulization, TID  lidocaine, 1 Application, Topical, Once  lubricating eye drops, 1 drop, Both Eyes, BID  lurasidone, 120 mg, oral, Daily with breakfast  metoprolol, 5 mg, intravenous, q6h  [Held by provider] metoprolol tartrate, 25 mg, oral, BID  nystatin, 5 mL, Swish & Swallow, 4x daily  oseltamivir, 30 mg, oral, BID  pantoprazole, 40 mg, oral, Daily before breakfast   Or  pantoprazole, 40 mg, intravenous, Daily before breakfast  polymyxin B sulf-trimethoprim, 1 drop, Both Eyes, q4h DEMOND  potassium chloride CR, 10 mEq, oral, Daily  vortioxetine, 20 mg, oral, Daily      Dietary Orders  (From admission, onward)       Start     Ordered    02/21/25 1902  NPO Diet; Effective now  Diet effective now        Comments: Please let medical house know if she passes RN bedside swallow    02/21/25 1904 02/21/25 1639  May Not Participate in Room Service  ( ROOM SERVICE MAY NOT PARTICIPATE)  Once        Question:  .  Answer:  Yes    02/21/25 1638                     Estimated Needs:   Method for Estimating Needs: 1710-2000kcal or 30-35kcal/kg IBW of 57kg  Method for Estimating 24 Hour Protein Needs: 68-86g or 1.2-1.5g/kg IBW  Method for Estimating 24 Hour Fluid Needs: 1mL/kcal/d or as per physician  Ideal body weight: 57 kg (125 lb 10.6 oz)  Adjusted ideal body weight: 67.6 kg (149 lb 1.9 oz)          Nutrition Diagnosis   Malnutrition Diagnosis  Patient has Malnutrition Diagnosis: No    Nutrition Diagnosis  Patient has Nutrition Diagnosis: Yes  Diagnosis Status (1): Active  Nutrition Diagnosis 1: Swallowing difficulty  Related to (1): history of disphagia  As Evidenced by (1): Pt with failed nursing and ST bedside; MBSS pending 2/25.  Additional Nutrition Diagnosis: Diagnosis 2  Diagnosis Status (2): Active  Nutrition Diagnosis 2: Inadequate energy intake  Related to (2): history of poor intakes after breakfast  As Evidenced by (2): currently inability to consume safe PO diet.  Additional Assessment Information (2): 2/24: Case discussed with nurse; dobhoff to be placed today.       Nutrition Interventions/Recommendations   Nutrition prescription for enteral nutrition    Nutrition Recommendations:  Individualized Nutrition Prescription Provided for : Enteral nutrition will meet >75% of estimated nutrient needs    Nutrition Interventions/Goals:   Interventions: Enteral intake  Enteral Intake: Insert enteral feeding tube  Goal: Recommend Jevity 1.5 start at 15mL/hr and increase by 10mL every 6-8 hours until goal of 55mL/hr is reached for 1980kcal, 84g pro, and 1003mL formula free water. (Recommend water flush  of 165mL 6x/day to provide 1993mL volume (formula + flush))  Coordination of Care with Providers: Nursing, Provider, Other (Comment) (RN Jamila; epic chat NP Cristhian)       Nutrition Monitoring and Evaluation   Food/Nutrient Related History Monitoring  Monitoring and Evaluation Plan: Enteral and parenteral nutrition intake determination  Enteral and Parenteral Nutrition Intake Determination: Enteral nutrition intake - To meet > 75% estimated energy needs         Biochemical Data, Medical Tests and Procedures  Monitoring and Evaluation Plan: Electrolyte/renal panel  Electrolyte and Renal Panel: Electrolytes within normal limits         Goal Status: Some progress toward goal(s)    Time Spent (min): 30 minutes

## 2025-02-24 NOTE — CARE PLAN
SHIFT NOTE    Patient remains hds. She remains very lethargic and withdrawn. ABG's were drawn on patient last night which were unremarkable. NP has been notified that as of a few minutes ago patient's heart rates go up into the 150-180's and then she comes out of it spontaneously. D5 IVF now running continuously.     Hourly rounding was performed and patient needs were met. Call light within reach. No other acute events, will continue to monitor.       The patient's goals for the shift include    Problem: Respiratory  Goal: Clear secretions with interventions this shift  2/24/2025 0658 by Liyah Palomares RN  Outcome: Progressing  2/23/2025 2039 by Liyah Palomares RN  Flowsheets (Taken 2/23/2025 2039)  Clear secretions with interventions this shift:   Encourage/provide pulmonary hygiene/secretion clearance   Med administration/monitoring of effect   Suctioning     Problem: Respiratory  Goal: No signs of respiratory distress (eg. Use of accessory muscles. Peds grunting)  Outcome: Progressing     Problem: Safety - Adult  Goal: Free from fall injury  2/24/2025 0658 by Liyah Palomares RN  Outcome: Progressing  2/23/2025 2039 by Liyah Palomares RN  Flowsheets (Taken 2/23/2025 2039)  Free from fall injury:   Instruct family/caregiver on patient safety   Based on caregiver fall risk screen, instruct family/caregiver to ask for assistance with transferring infant if caregiver noted to have fall risk factors     Problem: Chronic Conditions and Co-morbidities  Goal: Patient's chronic conditions and co-morbidity symptoms are monitored and maintained or improved  2/24/2025 0658 by Liyah Palomares RN  Outcome: Progressing  2/23/2025 2039 by Liyah Palomares RN  Flowsheets (Taken 2/23/2025 2039)  Care Plan - Patient's Chronic Conditions and Co-Morbidity Symptoms are Monitored and Maintained or Improved:   Monitor and assess patient's chronic conditions and comorbid symptoms for stability, deterioration, or improvement   Collaborate with  multidisciplinary team to address chronic and comorbid conditions and prevent exacerbation or deterioration   Update acute care plan with appropriate goals if chronic or comorbid symptoms are exacerbated and prevent overall improvement and discharge       The clinical goals for the shift include Patient will remain hds by end of shift 2/23/25 by 0700.

## 2025-02-24 NOTE — PROGRESS NOTES
02/24/25 1210   Discharge Planning   Living Arrangements Alone  ([facility] LTC)   Support Systems Nondenominational/angelita community  (Patient has no family)   Assistance Needed needs assistance with ADL's   Type of Residence Nursing home/residential care   Do you have animals or pets at home? No   Who is requesting discharge planning? Provider   Home or Post Acute Services Post acute facilities (Rehab/SNF/etc)   Type of Post Acute Facility Services Long term care   Expected Discharge Disposition Inter   Does the patient need discharge transport arranged? Yes   RoundTrip coordination needed? Yes   Has discharge transport been arranged? No   Patient Choice   Patient / Family choosing to utilize agency / facility established prior to hospitalization Yes   Intensity of Service   Intensity of Service >30 min     Patient does not have any emergency contacts listed so placed a call to [facility] where patient is a resident to see if they have any information. Per facility, Martell Fernandez is emergency contact ([phone]) for patient. Did place a call to Martell and he is patient's  and he is not POA. He sates that he has been doing things for patient for about 2 years which is when patient had a stroke. He states that patient will return to [facility] on discharge. Will inform attending of patient having no POA. CT team to follow.

## 2025-02-24 NOTE — CONSULTS
Reason For Consult  Pseudoaneurysm versus plaque ulceration    History Of Present Illness  Adela Barrett is a 69 y.o. female presenting with hypoxic respiratory failure.  Reports show patient was initially arousable to verbal stimuli though currently to sternal rub okay Difficult to evaluate due to altered mental status. Nonverbal. Pupils responsive reactive.  Baseline left-sided weakness reported.  Currently does not move to command.  Prior, brain attack was called and to CT head / CT angiogram of the head and neck obtained.  CT angio showed right internal carotid with 4 mm outpouching favoring plaque ulceration versus pseudoaneurysm and neurosurgery placed on consult.      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, Anxiety and depression, Bipolar 1 disorder (Multi), Calculus of gallbladder without cholecystitis, Carotid artery disease (CMS-HCC), Carpal tunnel syndrome, Cervical spondylosis, CHF (congestive heart failure), CKD (chronic kidney disease), CKD (chronic kidney disease), stage II, Contracture of left elbow, COPD (chronic obstructive pulmonary disease) (Multi), CVA (cerebral vascular accident) (Multi), Diabetes mellitus (Multi), 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    Surgical History  She has a past surgical history that includes Carpal tunnel release (Left); Tonsillectomy; Cataract extraction; Breast surgery;  "and NM injection.     Social History  She reports that she has quit smoking. Her smoking use included cigarettes. She does not have any smokeless tobacco history on file. She reports that she does not currently use alcohol. She reports that she does not use drugs.    Family History  Family History   Adopted: Yes   Family history unknown: Yes        Allergies  Bee venom protein (honey bee), Carisoprodol, Diclofenac, Lamotrigine, Metformin, and Venom-wasp    Review of Systems  Unable to obtain     Physical Exam  General: Well developed, obtunded   Skin: Warm and dry, no lesions, no rashes  ENMT: mouth dry, no apparent injury   Head/Neck: Neck Supple, no apparent injury  Respiratory/Thorax: Normal breath sounds with good chest expansion, thorax symmetric  Cardiovascular: No pitting edema, no JVD    Motor Strength: no purposeful movement     Muscle Bulk: Normal and symmetric in all extremities    Sensation: intact to light touch       Last Recorded Vitals  Blood pressure 145/69, pulse (!) 176, temperature 36.9 °C (98.4 °F), temperature source Temporal, resp. rate (!) 32, height 1.651 m (5' 5\"), weight 83.6 kg (184 lb 4.9 oz), SpO2 96%.    Relevant Results  ECG 12 Lead    Result Date: 2/24/2025  Sinus tachycardia with occasional Premature ventricular complexes Left axis deviation Inferior infarct (cited on or before 21-FEB-2025) Abnormal ECG When compared with ECG of 22-FEB-2025 16:14, (unconfirmed) Fusion complexes are no longer Present Premature ventricular complexes are now Present    ECG 12 Lead    Result Date: 2/24/2025  Sinus tachycardia with Fusion complexes Left axis deviation Pulmonary disease pattern Inferior infarct (cited on or before 21-FEB-2025) Abnormal ECG When compared with ECG of 21-FEB-2025 11:06, (unconfirmed) Fusion complexes are now Present T wave inversion no longer evident in Lateral leads    XR chest 1 view    Result Date: 2/23/2025  Interpreted By:  Tyler Concepcion, STUDY: XR CHEST 1 VIEW; " 2/23/2025 4:08 am   INDICATION: CLINICAL INFORMATION: Signs/Symptoms:influenza, worsening hypoxia.   COMPARISON: 02/21/2025   ACCESSION NUMBER(S): [id number]   ORDERING CLINICIAN: JAKI FIELD   TECHNIQUE: Portable chest one view.   FINDINGS: The cardiac size is indeterminate in view of the AP projection. There is prominence of the left hilum for which CT chest is recommended to rule out a left hilar mass. No infiltrates or effusions are identified.  Left basilar atelectasis is present.       1. Questionable left hilar mass for which CT chest with intravenous contrast is recommended for more definitive assessment. 2. Left basilar subsegmental atelectasis.   MACRO: none   Signed by: Tyler Concepcion 2/23/2025 7:08 AM Dictation workstation:   [id number]    CT brain attack angio head and neck W and WO IV contrast    Result Date: 2/22/2025  Interpreted By:  Sunil Hazel and Hooper Grayson STUDY: CT BRAIN ATTACK ANGIO HEAD AND NECK W AND WO IV CONTRAST; CT BRAIN ATTACK HEAD WO IV CONTRAST;  2/22/2025 4:41 pm; 2/22/2025 4:40 pm   INDICATION: Signs/Symptoms:Stroke team; Signs/Symptoms:acute AMS.     COMPARISON: Same day noncontrast head CT.   ACCESSION NUMBER(S): [id number]; [id number]   ORDERING CLINICIAN: BUBBA YANG   TECHNIQUE: Unenhanced brain CT was obtained with coronal and sagittal reformation of. 70 ML; N/A of Omnipaque 350; N/A was administered intravenously and axial images of the head and neck were acquired. Coronal, sagittal, and 3-D reconstructions were provided for review.   FINDINGS: BRAIN CT:   The lateral right parietal lobe has an old area of encephalomalacia, with a smaller area of encephalomalacia in the lateral left parietal lobe unchanged.   The sulci are dilated in the areas of encephalomalacia. The ventricles and sulci elsewhere are moderately prominent, unchanged.   No hemorrhage, mass or large acute infarct is noted.   Paranasal sinuses and mastoid air cells are normally  aerated.     CT ANGIOGRAM:   The main pulmonary artery is dilated to 3.6 cm.     CTA NECK:   AORTIC ARCH:   Minimal eccentric calcified atheromatous plaque observed in the thoracic aortic arch. Normal variant bovine arch noted.   COMMON CAROTID ARTERIES:   Course and caliber of the common carotid arteries bilaterally is unremarkable. No evidence of flow-limiting stenosis or significant atherosclerotic plaque burden.   INTERNAL CAROTID ARTERIES:   Both carotid bulbs demonstrate dense calcifications and atheromatous plaque, right worse than left.   Plaque ulceration/pseudo aneurysm with irregular posterior 4 mm outpouching noted of the posterior RIGHT C1 segment internal carotid artery just distal to the bifurcation. The remainder of the visualized extracranial course of the right internal carotid artery appears normal. Few scattered calcified plaques observed in the extracranial course of the left internal carotid artery.   VERTEBRAL ARTERIES:   Bilateral normal subclavian origins of the vertebral arteries observed. Courses and calibers of the vertebral arteries unremarkable. No flow-limiting stenosis, evidence of dissection, or aneurysm.   CTA HEAD:   The intracranial courses of the bilateral internal carotid arteries are densely calcified in the vicinity of the paraophthalmic and communicating segments of the arteries.   Vertebrobasilar confluence appears normal. Normal appearance of the basilar artery.   [id number]:   P1 and P2 segments of the posterior cerebral arteries appear normal. A1 and A2 segments of the anterior cerebral arteries appear normal. Normal appearance of the anterior communicating artery.   Normal appearances of the M1 and M2 segments of the middle cerebral arteries.   No evidence of large vessel occlusion. No evidence of aneurysm. No significant stenoses are identified.   OTHER OSSEOUS/SOFT TISSUE STRUCTURES:   Paravertebral soft tissue structures normal. No pathologically enlarged or  centrally necrotic lymph nodes.   Normal CT assessment of the thyroid. No supraclavicular lymphadenopathy.   The demonstrated upper lung parenchyma is normal.   No acute or aggressive appearing bony abnormality. No acute traumatic findings of the cervical spine.       1. No CT angiographic evidence of large vessel occlusion, flow-limiting stenosis, or true aneurysm of the evaluated great vessels of the head and neck. 2. Question possible ulcerative plaque versus pseudo aneurysm involving the proximal right C1 segment internal carotid artery just distal to the carotid bulb. 3. The main pulmonary artery is dilated to 3.6 cm; this can be seen with pulmonary artery hypertension.   I personally reviewed the images/study and I agree with the findings as stated. This study was interpreted at [place], [place].   MACRO: James Acevedo discussed the significance and urgency of this critical finding by epic secure chat with  BUBBA YANG on 2/22/2025 at 4:56 pm.  (**-RCF-**) Findings:  See findings.   Signed by: Sunil Hazel 2/22/2025 5:08 PM Dictation workstation:   [id number]    CT brain attack head wo IV contrast    Result Date: 2/22/2025  Interpreted By:  Sunil Hazel and Hooper Grayson STUDY: CT BRAIN ATTACK ANGIO HEAD AND NECK W AND WO IV CONTRAST; CT BRAIN ATTACK HEAD WO IV CONTRAST;  2/22/2025 4:41 pm; 2/22/2025 4:40 pm   INDICATION: Signs/Symptoms:Stroke team; Signs/Symptoms:acute AMS.     COMPARISON: Same day noncontrast head CT.   ACCESSION NUMBER(S): [id number]; [id number]   ORDERING CLINICIAN: BUBBA YANG   TECHNIQUE: Unenhanced brain CT was obtained with coronal and sagittal reformation of. 70 ML; N/A of Omnipaque 350; N/A was administered intravenously and axial images of the head and neck were acquired. Coronal, sagittal, and 3-D reconstructions were provided for review.   FINDINGS: BRAIN CT:   The lateral right parietal lobe has  an old area of encephalomalacia, with a smaller area of encephalomalacia in the lateral left parietal lobe unchanged.   The sulci are dilated in the areas of encephalomalacia. The ventricles and sulci elsewhere are moderately prominent, unchanged.   No hemorrhage, mass or large acute infarct is noted.   Paranasal sinuses and mastoid air cells are normally aerated.     CT ANGIOGRAM:   The main pulmonary artery is dilated to 3.6 cm.     CTA NECK:   AORTIC ARCH:   Minimal eccentric calcified atheromatous plaque observed in the thoracic aortic arch. Normal variant bovine arch noted.   COMMON CAROTID ARTERIES:   Course and caliber of the common carotid arteries bilaterally is unremarkable. No evidence of flow-limiting stenosis or significant atherosclerotic plaque burden.   INTERNAL CAROTID ARTERIES:   Both carotid bulbs demonstrate dense calcifications and atheromatous plaque, right worse than left.   Plaque ulceration/pseudo aneurysm with irregular posterior 4 mm outpouching noted of the posterior RIGHT C1 segment internal carotid artery just distal to the bifurcation. The remainder of the visualized extracranial course of the right internal carotid artery appears normal. Few scattered calcified plaques observed in the extracranial course of the left internal carotid artery.   VERTEBRAL ARTERIES:   Bilateral normal subclavian origins of the vertebral arteries observed. Courses and calibers of the vertebral arteries unremarkable. No flow-limiting stenosis, evidence of dissection, or aneurysm.   CTA HEAD:   The intracranial courses of the bilateral internal carotid arteries are densely calcified in the vicinity of the paraophthalmic and communicating segments of the arteries.   Vertebrobasilar confluence appears normal. Normal appearance of the basilar artery.   [id number]:   P1 and P2 segments of the posterior cerebral arteries appear normal. A1 and A2 segments of the anterior cerebral arteries appear normal.  Normal appearance of the anterior communicating artery.   Normal appearances of the M1 and M2 segments of the middle cerebral arteries.   No evidence of large vessel occlusion. No evidence of aneurysm. No significant stenoses are identified.   OTHER OSSEOUS/SOFT TISSUE STRUCTURES:   Paravertebral soft tissue structures normal. No pathologically enlarged or centrally necrotic lymph nodes.   Normal CT assessment of the thyroid. No supraclavicular lymphadenopathy.   The demonstrated upper lung parenchyma is normal.   No acute or aggressive appearing bony abnormality. No acute traumatic findings of the cervical spine.       1. No CT angiographic evidence of large vessel occlusion, flow-limiting stenosis, or true aneurysm of the evaluated great vessels of the head and neck. 2. Question possible ulcerative plaque versus pseudo aneurysm involving the proximal right C1 segment internal carotid artery just distal to the carotid bulb. 3. The main pulmonary artery is dilated to 3.6 cm; this can be seen with pulmonary artery hypertension.   I personally reviewed the images/study and I agree with the findings as stated. This study was interpreted at [place], [place].   MACRO: James Acevedo discussed the significance and urgency of this critical finding by epic secure chat with  BUBBA YANG on 2/22/2025 at 4:56 pm.  (**-RCF-**) Findings:  See findings.   Signed by: Sunil Hazel 2/22/2025 5:08 PM Dictation workstation:   [id number]        Assessment/Plan     Patient with minimal response  Imaging reviewed no discrete aneurysm identified  No acute neurosurgical intervention planned   with patient improvement, may follow-up with Dr. Yohannes Fields PA-C

## 2025-02-24 NOTE — PROGRESS NOTES
Speech-Language Pathology                 Therapy Communication Note    Patient Name: Adela Barrett  MRN: [id number]  Department: [facility]  Room: 2105/2105-A  Today's Date: 2/24/2025     Discipline: Speech Language Pathology      Missed Visit Reason:  Medical status/level of alertness    Missed Time: Attempt    Comment: Patient had been recommended for MBSS when evaluated 2/23; however, per discussion with RN, patient is not appropriate for participation at this time. Will continue to follow for MBSS when appropriate.

## 2025-02-24 NOTE — CARE PLAN
The patient's goals for the shift include      The clinical goals for the shift include Patient will remain hds by end of shift 2/23/25 by 0700.  Problem: Respiratory  Goal: Clear secretions with interventions this shift  Outcome: Not Progressing  Goal: No signs of respiratory distress (eg. Use of accessory muscles. Peds grunting)  Outcome: Not Progressing  Goal: Patent airway maintained this shift  Outcome: Not Progressing  Goal: Tolerate pulmonary toileting this shift  Outcome: Not Progressing     Problem: Safety - Adult  Goal: Free from fall injury  Outcome: Not Progressing     Problem: Chronic Conditions and Co-morbidities  Goal: Patient's chronic conditions and co-morbidity symptoms are monitored and maintained or improved  Outcome: Not Progressing     Problem: Nutrition  Goal: Nutrient intake appropriate for maintaining nutritional needs  Outcome: Not Progressing     Problem: Pain - Adult  Goal: Verbalizes/displays adequate comfort level or baseline comfort level  Outcome: Not Progressing     Problem: Discharge Planning  Goal: Discharge to home or other facility with appropriate resources  Outcome: Not Progressing     Problem: Skin  Goal: Participates in plan/prevention/treatment measures  Outcome: Not Progressing  Goal: Prevent/manage excess moisture  Outcome: Not Progressing  Goal: Prevent/minimize sheer/friction injuries  Outcome: Not Progressing  Goal: Promote/optimize nutrition  Outcome: Not Progressing  Goal: Promote skin healing  Outcome: Not Progressing

## 2025-02-25 ENCOUNTER — APPOINTMENT (OUTPATIENT)
Dept: RADIOLOGY | Facility: HOSPITAL | Age: 70
End: 2025-02-25
Payer: MEDICARE

## 2025-02-25 LAB
ANION GAP SERPL CALC-SCNC: 13 MMOL/L (ref 10–20)
BACTERIA BLD CULT: NORMAL
BACTERIA BLD CULT: NORMAL
BACTERIA UR CULT: NORMAL
BUN SERPL-MCNC: 39 MG/DL (ref 6–23)
CALCIUM SERPL-MCNC: 9.8 MG/DL (ref 8.6–10.3)
CHLORIDE SERPL-SCNC: 105 MMOL/L (ref 98–107)
CO2 SERPL-SCNC: 28 MMOL/L (ref 21–32)
CREAT SERPL-MCNC: 1.11 MG/DL (ref 0.5–1.05)
EGFRCR SERPLBLD CKD-EPI 2021: 54 ML/MIN/1.73M*2
ERYTHROCYTE [DISTWIDTH] IN BLOOD BY AUTOMATED COUNT: 18.6 % (ref 11.5–14.5)
GLUCOSE BLD MANUAL STRIP-MCNC: 196 MG/DL (ref 74–99)
GLUCOSE BLD MANUAL STRIP-MCNC: 204 MG/DL (ref 74–99)
GLUCOSE BLD MANUAL STRIP-MCNC: 230 MG/DL (ref 74–99)
GLUCOSE BLD MANUAL STRIP-MCNC: 233 MG/DL (ref 74–99)
GLUCOSE BLD MANUAL STRIP-MCNC: 271 MG/DL (ref 74–99)
GLUCOSE BLD MANUAL STRIP-MCNC: 300 MG/DL (ref 74–99)
GLUCOSE SERPL-MCNC: 282 MG/DL (ref 74–99)
HCT VFR BLD AUTO: 38.2 % (ref 36–46)
HGB BLD-MCNC: 11.1 G/DL (ref 12–16)
MAGNESIUM SERPL-MCNC: 2.08 MG/DL (ref 1.6–2.4)
MCH RBC QN AUTO: 25.8 PG (ref 26–34)
MCHC RBC AUTO-ENTMCNC: 29.1 G/DL (ref 32–36)
MCV RBC AUTO: 89 FL (ref 80–100)
NRBC BLD-RTO: 0 /100 WBCS (ref 0–0)
PLATELET # BLD AUTO: 218 X10*3/UL (ref 150–450)
POTASSIUM SERPL-SCNC: 3.3 MMOL/L (ref 3.5–5.3)
RBC # BLD AUTO: 4.3 X10*6/UL (ref 4–5.2)
SODIUM SERPL-SCNC: 143 MMOL/L (ref 136–145)
WBC # BLD AUTO: 9.5 X10*3/UL (ref 4.4–11.3)

## 2025-02-25 PROCEDURE — 71045 X-RAY EXAM CHEST 1 VIEW: CPT

## 2025-02-25 PROCEDURE — 2500000005 HC RX 250 GENERAL PHARMACY W/O HCPCS: Performed by: PHYSICIAN ASSISTANT

## 2025-02-25 PROCEDURE — 94640 AIRWAY INHALATION TREATMENT: CPT

## 2025-02-25 PROCEDURE — 82374 ASSAY BLOOD CARBON DIOXIDE: CPT | Performed by: PHYSICIAN ASSISTANT

## 2025-02-25 PROCEDURE — 2500000004 HC RX 250 GENERAL PHARMACY W/ HCPCS (ALT 636 FOR OP/ED): Performed by: INTERNAL MEDICINE

## 2025-02-25 PROCEDURE — 36415 COLL VENOUS BLD VENIPUNCTURE: CPT | Performed by: PHYSICIAN ASSISTANT

## 2025-02-25 PROCEDURE — 82947 ASSAY GLUCOSE BLOOD QUANT: CPT

## 2025-02-25 PROCEDURE — 5A0945A ASSISTANCE WITH RESPIRATORY VENTILATION, 24-96 CONSECUTIVE HOURS, HIGH NASAL FLOW/VELOCITY: ICD-10-PCS | Performed by: INTERNAL MEDICINE

## 2025-02-25 PROCEDURE — 83735 ASSAY OF MAGNESIUM: CPT | Performed by: PHYSICIAN ASSISTANT

## 2025-02-25 PROCEDURE — 2500000004 HC RX 250 GENERAL PHARMACY W/ HCPCS (ALT 636 FOR OP/ED): Performed by: NURSE PRACTITIONER

## 2025-02-25 PROCEDURE — 87081 CULTURE SCREEN ONLY: CPT | Mod: STJLAB | Performed by: NURSE PRACTITIONER

## 2025-02-25 PROCEDURE — 2500000001 HC RX 250 WO HCPCS SELF ADMINISTERED DRUGS (ALT 637 FOR MEDICARE OP): Performed by: PHYSICIAN ASSISTANT

## 2025-02-25 PROCEDURE — 74018 RADEX ABDOMEN 1 VIEW: CPT

## 2025-02-25 PROCEDURE — 99222 1ST HOSP IP/OBS MODERATE 55: CPT | Performed by: INTERNAL MEDICINE

## 2025-02-25 PROCEDURE — 2500000001 HC RX 250 WO HCPCS SELF ADMINISTERED DRUGS (ALT 637 FOR MEDICARE OP): Performed by: NURSE PRACTITIONER

## 2025-02-25 PROCEDURE — 74018 RADEX ABDOMEN 1 VIEW: CPT | Performed by: RADIOLOGY

## 2025-02-25 PROCEDURE — 2500000002 HC RX 250 W HCPCS SELF ADMINISTERED DRUGS (ALT 637 FOR MEDICARE OP, ALT 636 FOR OP/ED): Performed by: PHYSICIAN ASSISTANT

## 2025-02-25 PROCEDURE — 2500000004 HC RX 250 GENERAL PHARMACY W/ HCPCS (ALT 636 FOR OP/ED): Performed by: PHYSICIAN ASSISTANT

## 2025-02-25 PROCEDURE — 2500000002 HC RX 250 W HCPCS SELF ADMINISTERED DRUGS (ALT 637 FOR MEDICARE OP, ALT 636 FOR OP/ED): Performed by: INTERNAL MEDICINE

## 2025-02-25 PROCEDURE — 71045 X-RAY EXAM CHEST 1 VIEW: CPT | Performed by: RADIOLOGY

## 2025-02-25 PROCEDURE — 2500000005 HC RX 250 GENERAL PHARMACY W/O HCPCS: Performed by: INTERNAL MEDICINE

## 2025-02-25 PROCEDURE — 2060000001 HC INTERMEDIATE ICU ROOM DAILY

## 2025-02-25 PROCEDURE — 85027 COMPLETE CBC AUTOMATED: CPT | Performed by: PHYSICIAN ASSISTANT

## 2025-02-25 PROCEDURE — 2500000004 HC RX 250 GENERAL PHARMACY W/ HCPCS (ALT 636 FOR OP/ED)

## 2025-02-25 RX ORDER — POTASSIUM CHLORIDE 14.9 MG/ML
20 INJECTION INTRAVENOUS
Status: COMPLETED | OUTPATIENT
Start: 2025-02-25 | End: 2025-02-25

## 2025-02-25 RX ORDER — ASPIRIN 300 MG/1
300 SUPPOSITORY RECTAL DAILY
Status: DISCONTINUED | OUTPATIENT
Start: 2025-02-25 | End: 2025-02-27

## 2025-02-25 RX ORDER — AMIODARONE HYDROCHLORIDE 200 MG/1
200 TABLET ORAL 2 TIMES DAILY
Status: DISCONTINUED | OUTPATIENT
Start: 2025-02-25 | End: 2025-02-25

## 2025-02-25 RX ADMIN — POLYMYXIN B SULFATE AND TRIMETHOPRIM 1 DROP: 10000; 1 SOLUTION OPHTHALMIC at 02:11

## 2025-02-25 RX ADMIN — Medication 12 L/MIN: at 08:00

## 2025-02-25 RX ADMIN — POTASSIUM CHLORIDE 20 MEQ: 14.9 INJECTION, SOLUTION INTRAVENOUS at 10:00

## 2025-02-25 RX ADMIN — FUROSEMIDE 20 MG: 10 INJECTION, SOLUTION INTRAVENOUS at 09:58

## 2025-02-25 RX ADMIN — INSULIN LISPRO 6 UNITS: 100 INJECTION, SOLUTION INTRAVENOUS; SUBCUTANEOUS at 10:00

## 2025-02-25 RX ADMIN — Medication 90 PERCENT: at 11:43

## 2025-02-25 RX ADMIN — NYSTATIN 500000 UNITS: 100000 SUSPENSION ORAL at 12:26

## 2025-02-25 RX ADMIN — IPRATROPIUM BROMIDE AND ALBUTEROL SULFATE 3 ML: 2.5; .5 SOLUTION RESPIRATORY (INHALATION) at 10:31

## 2025-02-25 RX ADMIN — POLYMYXIN B SULFATE AND TRIMETHOPRIM 1 DROP: 10000; 1 SOLUTION OPHTHALMIC at 17:04

## 2025-02-25 RX ADMIN — INSULIN LISPRO 4 UNITS: 100 INJECTION, SOLUTION INTRAVENOUS; SUBCUTANEOUS at 21:41

## 2025-02-25 RX ADMIN — POLYMYXIN B SULFATE AND TRIMETHOPRIM 1 DROP: 10000; 1 SOLUTION OPHTHALMIC at 21:41

## 2025-02-25 RX ADMIN — ASPIRIN 300 MG: 300 SUPPOSITORY RECTAL at 16:59

## 2025-02-25 RX ADMIN — POLYVINYL ALCOHOL, POVIDONE 1 DROP: 14; 6 SOLUTION/ DROPS OPHTHALMIC at 21:41

## 2025-02-25 RX ADMIN — POLYMYXIN B SULFATE AND TRIMETHOPRIM 1 DROP: 10000; 1 SOLUTION OPHTHALMIC at 14:37

## 2025-02-25 RX ADMIN — ENOXAPARIN SODIUM 80 MG: 80 INJECTION SUBCUTANEOUS at 09:59

## 2025-02-25 RX ADMIN — INSULIN LISPRO 6 UNITS: 100 INJECTION, SOLUTION INTRAVENOUS; SUBCUTANEOUS at 05:23

## 2025-02-25 RX ADMIN — AMIODARONE HYDROCHLORIDE 0.5 MG/MIN: 1.8 INJECTION, SOLUTION INTRAVENOUS at 05:25

## 2025-02-25 RX ADMIN — METOPROLOL TARTRATE 5 MG: 5 INJECTION INTRAVENOUS at 05:23

## 2025-02-25 RX ADMIN — PIPERACILLIN SODIUM AND TAZOBACTAM SODIUM 3.38 G: 3; .375 INJECTION, SOLUTION INTRAVENOUS at 10:00

## 2025-02-25 RX ADMIN — INSULIN LISPRO 4 UNITS: 100 INJECTION, SOLUTION INTRAVENOUS; SUBCUTANEOUS at 16:59

## 2025-02-25 RX ADMIN — PANTOPRAZOLE SODIUM 40 MG: 40 INJECTION, POWDER, FOR SOLUTION INTRAVENOUS at 06:15

## 2025-02-25 RX ADMIN — IPRATROPIUM BROMIDE AND ALBUTEROL SULFATE 3 ML: 2.5; .5 SOLUTION RESPIRATORY (INHALATION) at 15:05

## 2025-02-25 RX ADMIN — POLYMYXIN B SULFATE AND TRIMETHOPRIM 1 DROP: 10000; 1 SOLUTION OPHTHALMIC at 05:23

## 2025-02-25 RX ADMIN — POTASSIUM CHLORIDE 20 MEQ: 14.9 INJECTION, SOLUTION INTRAVENOUS at 12:21

## 2025-02-25 RX ADMIN — INSULIN LISPRO 4 UNITS: 100 INJECTION, SOLUTION INTRAVENOUS; SUBCUTANEOUS at 12:23

## 2025-02-25 RX ADMIN — METOPROLOL TARTRATE 5 MG: 5 INJECTION INTRAVENOUS at 12:28

## 2025-02-25 RX ADMIN — AMIODARONE HYDROCHLORIDE 0.5 MG/MIN: 1.8 INJECTION, SOLUTION INTRAVENOUS at 17:00

## 2025-02-25 RX ADMIN — NYSTATIN 500000 UNITS: 100000 SUSPENSION ORAL at 17:07

## 2025-02-25 RX ADMIN — ENOXAPARIN SODIUM 80 MG: 80 INJECTION SUBCUTANEOUS at 21:41

## 2025-02-25 RX ADMIN — PIPERACILLIN SODIUM AND TAZOBACTAM SODIUM 3.38 G: 3; .375 INJECTION, SOLUTION INTRAVENOUS at 18:17

## 2025-02-25 RX ADMIN — METOPROLOL TARTRATE 5 MG: 5 INJECTION INTRAVENOUS at 17:03

## 2025-02-25 RX ADMIN — Medication 60 L/MIN: at 20:00

## 2025-02-25 RX ADMIN — POLYMYXIN B SULFATE AND TRIMETHOPRIM 1 DROP: 10000; 1 SOLUTION OPHTHALMIC at 10:00

## 2025-02-25 RX ADMIN — POLYVINYL ALCOHOL, POVIDONE 1 DROP: 14; 6 SOLUTION/ DROPS OPHTHALMIC at 09:59

## 2025-02-25 RX ADMIN — CEFTRIAXONE 2 G: 2 INJECTION, POWDER, FOR SOLUTION INTRAMUSCULAR; INTRAVENOUS at 05:23

## 2025-02-25 RX ADMIN — IPRATROPIUM BROMIDE AND ALBUTEROL SULFATE 3 ML: 2.5; .5 SOLUTION RESPIRATORY (INHALATION) at 23:57

## 2025-02-25 RX ADMIN — Medication 90 PERCENT: at 14:04

## 2025-02-25 ASSESSMENT — COGNITIVE AND FUNCTIONAL STATUS - GENERAL
CLIMB 3 TO 5 STEPS WITH RAILING: TOTAL
MOBILITY SCORE: 6
DAILY ACTIVITIY SCORE: 6
EATING MEALS: TOTAL
MOVING TO AND FROM BED TO CHAIR: TOTAL
TOILETING: TOTAL
DRESSING REGULAR UPPER BODY CLOTHING: TOTAL
WALKING IN HOSPITAL ROOM: TOTAL
MOVING FROM LYING ON BACK TO SITTING ON SIDE OF FLAT BED WITH BEDRAILS: TOTAL
TURNING FROM BACK TO SIDE WHILE IN FLAT BAD: TOTAL
HELP NEEDED FOR BATHING: TOTAL
STANDING UP FROM CHAIR USING ARMS: TOTAL
DRESSING REGULAR LOWER BODY CLOTHING: TOTAL
PERSONAL GROOMING: TOTAL

## 2025-02-25 ASSESSMENT — PAIN SCALES - GENERAL
PAINLEVEL_OUTOF10: 0 - NO PAIN

## 2025-02-25 ASSESSMENT — PAIN - FUNCTIONAL ASSESSMENT
PAIN_FUNCTIONAL_ASSESSMENT: 0-10

## 2025-02-25 NOTE — PROGRESS NOTES
Speech-Language Pathology                 Therapy Communication Note    Patient Name: Adela Barrett  MRN: [id number]  Department: [facility] 2  Room: 2105/2105-A  Today's Date: 2/25/2025     Discipline: Speech Language Pathology      Missed Visit Reason:  Medical status    Missed Time: Attempt    Comment: Patient remains inappropriate for swallowing assessment/treatment at this time, per discussion with RN. Will continue to follow and re-attempt when appropriate.

## 2025-02-25 NOTE — NURSING NOTE
Several attempts made to rectify corpak positioning, unfortunately no staff available to assist.  Will pass info along to night shift.

## 2025-02-25 NOTE — CARE PLAN
SHIFT NOTE    Patient remains hds. She is still very drowsy and not alert and oriented. Patient needed to be bumped up to 12 L oximizer overnight because she was desating too 88-89%. Patient's Corpak is still not in the correct position because no one was able to come over and re-advance it- med Sandy is aware.     Hourly rounding was performed and patient needs were met. Call light within reach. No other acute events, will continue to monitor.       The patient's goals for the shift include    Problem: Respiratory  Goal: No signs of respiratory distress (eg. Use of accessory muscles. Peds grunting)  Outcome: Progressing     Problem: Respiratory  Goal: Patent airway maintained this shift  Outcome: Progressing     Problem: Safety - Adult  Goal: Free from fall injury  2/25/2025 0648 by Liyah Palomares RN  Outcome: Progressing  2/24/2025 2035 by Liyah Palomares RN  Flowsheets (Taken 2/23/2025 2039)  Free from fall injury:   Instruct family/caregiver on patient safety   Based on caregiver fall risk screen, instruct family/caregiver to ask for assistance with transferring infant if caregiver noted to have fall risk factors     Problem: Chronic Conditions and Co-morbidities  Goal: Patient's chronic conditions and co-morbidity symptoms are monitored and maintained or improved  2/25/2025 0648 by Liyah Palomares RN  Outcome: Progressing  2/24/2025 2035 by Liyah Palomares RN  Flowsheets (Taken 2/23/2025 2039)  Care Plan - Patient's Chronic Conditions and Co-Morbidity Symptoms are Monitored and Maintained or Improved:   Monitor and assess patient's chronic conditions and comorbid symptoms for stability, deterioration, or improvement   Collaborate with multidisciplinary team to address chronic and comorbid conditions and prevent exacerbation or deterioration   Update acute care plan with appropriate goals if chronic or comorbid symptoms are exacerbated and prevent overall improvement and discharge     Problem: Pain - Adult  Goal:  Verbalizes/displays adequate comfort level or baseline comfort level  2/25/2025 0648 by Liyah Palomares RN  Outcome: Progressing  2/24/2025 2035 by Liyah Palomares RN  Flowsheets (Taken 2/23/2025 2039)  Verbalizes/displays adequate comfort level or baseline comfort level:   Assess pain using appropriate pain scale   Encourage patient to monitor pain and request assistance   Administer analgesics based on type and severity of pain and evaluate response       The clinical goals for the shift include Patient will remain hds by end of shift 2/25/25 at 0700.

## 2025-02-25 NOTE — CONSULTS
Pulmonary Consult    Request of Pulmonary Consult to evaluate Adela Barrett  for hypoxia  I have independently interviewed and examined the patient in the office and reviewed available records.    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, mild aortic stenosis, pulmonary HTN, carotid artery disease, intracranial atherosclerosis, cognitive communication deficit, CKD 2, non small cell lung cancer of LLL (Dx 2019, s/p carboplatin/paclitaxel/pembrolizumab and RT), former smoker history,  incidental PE (4/2020), COPD on PRN oxygen (2 liters nasal cannula prn), HFpEF, prolonged QT interval, type II diabetes (Hem A1C 6.4% April 2024), HTN, HLD, Bipolar affective disorder, anxiety with depression, contracture left elbow, oropharyngeal phase dysphagia, spinal stenosis, carpal tunnel syndrome, UTI, pneumonia, difficulty walking, falls, and neuropathy.    Patient is unable to give history , History obtained from reviewing EMR.  She is presented from nursing home with encephalopathy and hypoxia  Pulmonary service consulted for respiratory failure   Patient has prolonged history of COPD maintained on oxygen therapy as 2L baseline  She is tested positive for Influenza A and requiring high oxygen therapy 12 Nc    Family History:  Family History   Adopted: Yes   Family history unknown: Yes       Social History:  Social History     Socioeconomic History    Marital status: Single   Tobacco Use    Smoking status: Former     Types: Cigarettes   Vaping Use    Vaping status: Unknown   Substance and Sexual Activity    Alcohol use: Not Currently    Drug use: Never    Sexual activity: Defer     Social Drivers of Health     Financial Resource Strain: Patient Unable To Answer (2/21/2025)    Overall Financial Resource Strain (CARDIA)     Difficulty of Paying Living Expenses: Patient unable to answer   Food Insecurity: Patient Unable To Answer (2/21/2025)    Hunger Vital  Sign     Worried About Running Out of Food in the Last Year: Patient unable to answer     Ran Out of Food in the Last Year: Patient unable to answer   Transportation Needs: Patient Unable To Answer (2/21/2025)    PRAPARE - Transportation     Lack of Transportation (Medical): Patient unable to answer     Lack of Transportation (Non-Medical): Patient unable to answer   Physical Activity: Not on File (12/3/2021)    Received from [employer]    Physical Activity     Physical Activity: 0   Stress: Not on File (12/3/2021)    Received from [employer]    Stress     Stress: 0   Social Connections: Not on File (12/3/2021)    Received from [employer]    Social Connections     Connectedness: 0   Intimate Partner Violence: Patient Unable To Answer (2/21/2025)    Humiliation, Afraid, Rape, and Kick questionnaire     Fear of Current or Ex-Partner: Patient unable to answer     Emotionally Abused: Patient unable to answer     Physically Abused: Patient unable to answer     Sexually Abused: Patient unable to answer   Housing Stability: Patient Unable To Answer (2/21/2025)    Housing Stability Vital Sign     Unable to Pay for Housing in the Last Year: Patient unable to answer     Number of Times Moved in the Last Year: 0     Homeless in the Last Year: Patient unable to answer       Current Medications:  Current Outpatient Medications   Medication Instructions    acetaminophen (TYLENOL 8 HOUR) 650 mg, Every 6 hours PRN    albuterol 2.5 mg, nebulization, Every 6 hours PRN    alum-mag hydroxide-simeth (Mylanta) 200-200-20 mg/5 mL oral suspension 30 mL, Every 6 hours PRN    aspirin 81 mg, Daily    atorvastatin (LIPITOR) 80 mg, Nightly    baclofen (LIORESAL) 5 mg, 2 times daily    bisacodyl (DULCOLAX) 10 mg, Daily PRN    cholecalciferol (VITAMIN D-3) 50 mcg, Daily    diazePAM (VALIUM) 10 mg, Every 8 hours PRN    furosemide (LASIX) 20 mg, Daily    gabapentin (NEURONTIN) 100 mg, 2 times daily    glimepiride (AMARYL) 4 mg, Daily before breakfast     "ipratropium (Atrovent) 42 mcg (0.06 %) nasal spray 2 sprays, Every 6 hours PRN    loperamide (IMODIUM) 2 mg, 4 times daily PRN    lurasidone (LATUDA) 120 mg, Daily with breakfast    magnesium hydroxide (Milk of Magnesia) 400 mg/5 mL suspension 30 mL, Daily PRN    nitroglycerin (NITROSTAT) 0.4 mg, Every 5 min PRN    ondansetron (ZOFRAN) 4 mg, Every 6 hours PRN    pantoprazole (PROTONIX) 40 mg, Daily before breakfast    polyethylene glycol (GLYCOLAX, MIRALAX) 17 g, Daily PRN    potassium chloride CR 10 mEq ER tablet 10 mEq, Daily    SITagliptin phosphate (JANUVIA) 25 mg, Daily    umeclidinium-vilanteroL (Anoro Ellipta) 62.5-25 mcg/actuation blister with device 1 puff, inhalation, Daily    vortioxetine (TRINTELLIX) 20 mg, Daily        Drug Allergies/Intolerances:  Allergies   Allergen Reactions    Bee Venom Protein (Honey Bee) Unknown    Carisoprodol Unknown    Diclofenac Unknown    Lamotrigine Unknown    Metformin Unknown    Venom-Wasp Unknown          Physical Examination:  /54   Pulse 104   Temp 36.2 °C (97.2 °F) (Temporal)   Resp (!) 32   Ht 1.651 m (5' 5\")   Wt 84.7 kg (186 lb 11.7 oz)   SpO2 95%   BMI 31.07 kg/m²      General: AO *0, arousable  HEENT: opens eyes, on oxygen therapy  Neck: supple; no lymphadenopathy or thyromegaly.  Chest: distant breath sounds  Cardiac: no gallop or murmur.  Abdomen: soft; non-tender; non-distended; no hepatosplenomegaly.  Extremities: localizes to pain    Chest Radiograph     XR chest 1 view 02/25/2025    Narrative  Interpreted By:  Jefferson Bull,  STUDY:  XR CHEST 1 VIEW;  2/25/2025 10:09 am    INDICATION:  Signs/Symptoms:sob.    COMPARISON:  02/23/2025    ACCESSION NUMBER(S):  [id number]    ORDERING CLINICIAN:  CHIN YANG    FINDINGS:  Increased bibasilar opacities. Cardiomediastinal silhouette  unchanged. Unchanged mild pulmonary vascular congestion. Feeding tube  partially imaged.    Impression  Increased bibasilar atelectasis or " infiltrates.    MACRO:  None    Signed by: Jefferson Bull 2/25/2025 10:24 AM  Dictation workstation:   [id number]      Echocardiogram     No results found for this or any previous visit from the past 365 days.       Chest CT Scan     No results found for this or any previous visit from the past 365 days.       Co-morbidities Problem List    Assessment and Plan / Recommendations:  Problem List Items Addressed This Visit       * (Principal) Sepsis with encephalopathy without septic shock, due to unspecified organism (Multi) - Primary    UTI (urinary tract infection)    Altered mental status    Relevant Orders    Carotid duplex bilateral (Completed)     Other Visit Diagnoses       Aneurysm of other specified arteries        Relevant Orders    Carotid duplex bilateral (Completed)    Other specified symptoms and signs involving the circulatory and respiratory systems        Atrial fibrillation, unspecified type (Multi)        Relevant Medications    nitroglycerin (Nitrostat) 0.4 mg SL tablet    metoprolol tartrate (Lopressor) tablet 25 mg    metoprolol tartrate (Lopressor) injection 5 mg (Completed)    metoprolol tartrate (Lopressor) injection 5 mg    metoprolol tartrate (Lopressor) injection 2.5 mg (Completed)    Other Relevant Orders    Transthoracic Echo (TTE) Complete (Completed)    Atrial tachycardia (CMS-HCC)        Relevant Medications    nitroglycerin (Nitrostat) 0.4 mg SL tablet    metoprolol tartrate (Lopressor) tablet 25 mg    metoprolol tartrate (Lopressor) injection 5 mg (Completed)    metoprolol tartrate (Lopressor) injection 5 mg    metoprolol tartrate (Lopressor) injection 2.5 mg (Completed)    Other Relevant Orders    Transthoracic Echo (TTE) Complete (Completed)          Respiratory failure:  requiring high oxygen 12 L   continue with oxygen therapy maintain saturation> 90    Metabolic encephalopathy   2ry  to influenza A, UTI, respiratory failure, DIONNE, cardiac arrhythmia     Influenza A:  oseltamivir    COPD : Recommend duo neb/ 8 hours    Other medical problem  CVA  CHF  DIONNE  Afib    Palliative care consultation        Sri Bass MD

## 2025-02-25 NOTE — ASSESSMENT & PLAN NOTE
Acute respiratory failure requiring high oxygen 12 L with sepsis and questionable aspiration patient was diagnosed with influenza A started on Tamiflu start patient on Zosyn for possible aspiration repeat chest x-ray  Patient has a poor prognosis related to respiratory failure, history of CVA with left-sided weakness, congestive heart failure with preserved ejection fraction  There is no power of  consult palliative care and consult pulmonary medicine  Atrial fibrillation patient started on amiodarone drip heart rate is better  Neurology and neurosurgery on board and no plan for intervention

## 2025-02-25 NOTE — PROGRESS NOTES
"Subjective  Patient is not doing well requiring 12 L of oxygen still no power of   Nursing staff was interviewed  Objectives    Last Recorded Vitals  Blood pressure 126/66, pulse 90, temperature (!) 38 °C (100.4 °F), temperature source Temporal, resp. rate (!) 29, height 1.651 m (5' 5\"), weight 84.7 kg (186 lb 11.7 oz), SpO2 96%.    Physical Exam  HENT:      Right Ear: External ear normal.      Left Ear: External ear normal.      Mouth/Throat:      Mouth: Mucous membranes are moist.   Cardiovascular:      Rate and Rhythm: Normal rate and regular rhythm.      Heart sounds: No murmur heard.     No friction rub. No gallop.   Pulmonary:      Effort: No accessory muscle usage or respiratory distress.      Breath sounds: No stridor. No wheezing or rhonchi.   Chest:      Chest wall: No tenderness.   Abdominal:      General: There is no distension.      Palpations: There is no mass.      Tenderness: There is no abdominal tenderness. There is no guarding or rebound.   Musculoskeletal:         General: No deformity or signs of injury.      Cervical back: No rigidity or tenderness. Normal range of motion.      Right lower leg: No edema.      Left lower leg: No edema.   Skin:     Coloration: Skin is not jaundiced or pale.      Findings: No lesion.   Neurological:   Very lethargic       Labs    No results displayed because visit has over 200 results.            Imaging          Patient Active Problem List   Diagnosis    Sepsis with encephalopathy without septic shock, due to unspecified organism (Multi)    UTI (urinary tract infection)    Influenza A    Acute hypoxic respiratory failure (Multi)    History of COPD    (HFpEF) heart failure with preserved ejection fraction    Pulmonary HTN (Multi)    AS (aortic stenosis)    Altered mental status    Elevated troponin I level         Assessment/Plan   Assessment & Plan  Sepsis with encephalopathy without septic shock, due to unspecified organism (Multi)    UTI (urinary tract " infection)    Influenza A    Acute hypoxic respiratory failure (Multi)    History of COPD    (HFpEF) heart failure with preserved ejection fraction    Pulmonary HTN (Multi)    AS (aortic stenosis)    Altered mental status    Elevated troponin I level  Acute respiratory failure requiring high oxygen 12 L with sepsis and questionable aspiration patient was diagnosed with influenza A started on Tamiflu start patient on Zosyn for possible aspiration repeat chest x-ray  Patient has a poor prognosis related to respiratory failure, history of CVA with left-sided weakness, congestive heart failure with preserved ejection fraction  There is no power of  consult palliative care and consult pulmonary medicine  Atrial fibrillation patient started on amiodarone drip heart rate is better  Neurology and neurosurgery on board and no plan for intervention           I spent 25 minutes in the professional and overall care of this patient.      CHIN Mercer MD

## 2025-02-25 NOTE — CONSULTS
Reason For Consult      Poor quality of life, health declining since stroke, no POA    History Of Present Illness  Adela Barrett is a 69 y.o. female presenting with AMS/Sepsis.     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, Anxiety and depression, Bipolar 1 disorder (Multi), Calculus of gallbladder without cholecystitis, Carotid artery disease (CMS-HCC), Carpal tunnel syndrome, Cervical spondylosis, CHF (congestive heart failure), CKD (chronic kidney disease), CKD (chronic kidney disease), stage II, Contracture of left elbow, COPD (chronic obstructive pulmonary disease) (Multi), CVA (cerebral vascular accident) (Multi), Diabetes mellitus (Multi), 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    Surgical History  She has a past surgical history that includes Carpal tunnel release (Left); Tonsillectomy; Cataract extraction; Breast surgery; and NM injection.     Social History  She reports that she has quit smoking. Her smoking use included cigarettes. She does not have any smokeless tobacco history on file. She reports that she does not currently use alcohol. She reports that she does not use drugs.    Family History  Family History   Adopted: Yes   Family history unknown: Yes        Allergies  Bee venom protein (honey bee), Carisoprodol, Diclofenac, Lamotrigine, Metformin, and Venom-wasp       Last Recorded Vitals  Blood pressure  "119/59, pulse 108, temperature 37.5 °C (99.5 °F), resp. rate (!) 37, height 1.651 m (5' 5\"), weight 84.7 kg (186 lb 11.7 oz), SpO2 97%.         Assessment/Plan     Pt from [facility]. Pts code status is Full Code. Pts Contact is Martell Fernandez . Pt with pmhx of acute hypoxia and altered mental status with a recent diagnosis of Influenza A on 2/20/25. The patient's past medical history is significant for multiple ischemic CVAs (10/6/23) with left sided hemiplegia & hemiparesis, mild aortic stenosis, pulmonary HTN, carotid artery disease, intracranial atherosclerosis, cognitive communication deficit, CKD 2, non small cell lung cancer of LLL (Dx 2019, s/p carboplatin/paclitaxel/pembrolizumab and RT), former smoker history,  incidental PE (4/2020), COPD on PRN oxygen (2 liters nasal cannula prn), HFpEF, prolonged QT interval, type II diabetes (Hem A1C 6.4% April 2024), HTN, HLD, Bipolar affective disorder, anxiety with depression, contracture left elbow, oropharyngeal phase dysphagia, spinal stenosis, carpal tunnel syndrome, UTI, pneumonia, difficulty walking, falls, and neuropathy. Pt admit dx of AMS/Sepsis. Plan to introduce Palliative Care and discuss goals of care with pt/ contact.      4p  Rounded on pt, spoke with RN. Pt unable to have any meaningful conversation. Pt does not open her eyes. Pt is alert and oriented x 0. Pt has been here for several days with no signs of improvement. Pt comes from LTC at [facility]. Spoke with pt's  Martell by phone . Martell has been pts  for the past 8 years. Martell is not her HPOA, but he has been helping her for several years although he has no legal authority. Pt has no family or friends per Martell. Discussed pt's poor prognosis , and Martell is aware as he visited her here at the hospital. He states DNRCC is consistent with pt's wishes due to her poor quality of life since the stroke. Explained to Martell that we would like to consult " Ethics, change pt's code status to DNRCC, and start comfort care here/back [facility] needed. He is aware that pt would most likely pass once we do this, he is agreeable and states pt is a [employer] woman and this is what she would want. Will consult Ethics and await their recs, and will follow .       Lia Millan, JORGEW

## 2025-02-25 NOTE — NURSING NOTE
Attempted to get an ICU nurse to re-advance patient's Corpak since per abd XR it appears to be coiled. No one from ICU has come over, so I spoke with both Cali Ervin and Genesis Archer NP and they said that if no one gets to it tonight they can deal with it in the morning. Ok to hold patient's PO meds tonight.

## 2025-02-25 NOTE — PROGRESS NOTES
Subjective Data:  I48.91 Atrial fibrillation converted to sinus rhythm with an IV amiodarone  I42.9 HFrEF EF 35 to 40%-now improved greater than 50%  I26.9 Past history pulmonary embolism      Recommend changing amiodarone to oral later when able to swallow for 30 days currently nonresponsive unable to swallow and feeding tube not working so will keep on IV drip 1 more day  In future consider Eliquis instead of aspirin but first long-term monitor in discussion with neurology  Continue high-dose statins  Monitor pulmonary status    Overnight Events:    Seen by me during previous admission seen yesterday by my colleague Dr. Schmidt as I was in clinic recommended aggressive rhythm strategy because of hypoperfusion and HFrEF chemically converted with IV amiodarone to sinus rhythm past history of pulmonary embolism.  Presented with positive flu mental status changes converted while in hospital to atrial fibrillation treated with IV diltiazem IV metoprolol developed skin mottling on drips suggestive of poor perfusion aggressive strategy recommended successfully.  Current echo shows significant improvement from 35 to 40% historically to 50 to 55% currently with mild to moderate mitral regurgitation.  A carotid duplex showed mild plaque bilaterally.  She normally sees Dr. Haresh pena cardiology  at [place] her 2 previous echoes were read by his colleagues Dr. Horner and Dr. Krueger my involvement in the patient was at [facility]    I noted multiple ischemic strokes 10/6/2023 mild AAS mild to moderate pulmonary hypertension incidental PE April 2020 chronic kidney disease non-small cell lung cancer 2019 treated with carboplatin plaxitaxel, carboplatin, pembrolizumab and RT. she has anxiety COPD dyslipidemia obesity BMI of 41 spinal stenosis carpal tunnel previous pneumonia remote right parietal infarct in 2023 plan old infarcts left parietal occipital lobe small left SCA stroke in  the past she was recommended a Zio patch by me and neurology had recommended aspirin atorvastatin treatment when I saw her in 2023    Echocardiogram  CONCLUSIONS:   1. The left ventricular systolic function is low normal, with a visually estimated ejection fraction of 50-55%.   2. Spectral Doppler shows a Grade I (impaired relaxation pattern) of left ventricular diastolic filling with normal left atrial filling pressure.   3. Mild to moderate mitral valve regurgitation.   4. Aortic valve sclerosis.     Prior [facility] echo    Impression  Performed by [facility]  CONCLUSIONS:  - Technically difficult exam due to body habitus and suboptimal positioning.  - Exam indication: Stroke  - The left ventricle is normal in size. There is mild left ventricular  hypertrophy. Left ventricular systolic function is moderately decreased. EF = 39 ±   5% (2D biplane) Grade II left ventricular diastolic dysfunction.  - Multivessel wall motion most prominently LAD and RCA wall motion abnormalities;  mild in basal inferolateral with Cx territory.  - The right ventricle is normal in size.  - Exam was compared with the prior  echocardiographic exam performed on  5-6-2023. RCA WMA was there prior, now some more in LAD territory is noted.      Electronically signed by Ceasar Velasco MD on 10/8/2023 at 1:34:50 PM      Echocardiogram    Impression  Performed by [facility]  CONCLUSIONS:  - Technically difficult exam due to body habitus, suboptimal positioning and  coughing, patient's pain level.  - Exam indication: Initial evaluation of Heart Failure  - The left ventricle is normal in size. There is mild concentric left ventricular  hypertrophy. Left ventricular systolic function is normal. EF = 55 ± 5% (visual  est.) Left ventricular diastolic function was not evaluated due to heart rate.  - The right ventricle is normal in size. Right ventricular systolic function is  normal.  - Mild aortic stenosis.  - The  patient has not had a prior CC echocardiographic exam for comparison.      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   Bipolar affective disorder (HCC)   Calculus of gallbladder without cholecystitis without obstruction 05/11/2023   Carotid artery disease (HCC)   Carpal tunnel syndrome   Cervical spondylosis without myelopathy 05/30/2013   Chemotherapy-induced neuropathy (HCC) 08/26/2021   CKD3   COPD (chronic obstructive pulmonary disease) (HCC) 11/25/2022   CVA (cerebral vascular accident) ([facility]) 10/2023   Depression   DM2   Essential hypertension, benign   resolved with weight loss   Generalized anxiety disorder 01/20/2022   History of pulmonary embolism 04/2020   Hyperlipidemia   Mild aortic stenosis 05/2023   Non-small cell lung cancer (HCC) 04/2019   SCC   Obesity, Class II, BMI 35-39.9 11/25/2022   Osteopenia (DXA 2/8/23)   Prolonged QT interval 11/25/2022   Unspecified chronic bronchitis ([facility])       PAST SURGICAL HISTORY[]Expand by Default     PAST SURGICAL HISTORY   Procedure Laterality Date   PAST SURGICAL HISTORY OF Bilateral   breast reduction   PAST SURGICAL HISTORY OF   cervical injections   PAST SURGICAL HISTORY OF 01/15/2015   left carpal tunnel release   REDUCTION OF LARGE BREAST   Breast reduction   TONSILLECTOMY & ADENOIDECTOMY <AGE 13   Tonsil/adenoidectomy   XCAPSL CTRC RMVL INSJ IO LENS PROSTH W/O ECP Right 09/24/2020   Cataract Extraction with PC IOL   XCAPSL CTRC RMVL INSJ IO LENS PROSTH W/O ECP Left   Cataract Extraction with PC IOL       FAMILY HISTORY[]Expand by Default   FAMILY HISTORY   Adopted: Yes       SOCIAL HISTORY[]Expand by Default   Social History     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, Anxiety and depression,  Bipolar 1 disorder (Multi), Calculus of gallbladder without cholecystitis, Carotid artery disease (CMS-HCC), Carpal tunnel syndrome, Cervical spondylosis, CHF (congestive heart failure), CKD (chronic kidney disease), CKD (chronic kidney disease), stage II, Contracture of left elbow, COPD (chronic obstructive pulmonary disease) (Multi), CVA (cerebral vascular accident) (Multi), Diabetes mellitus (Multi), 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     Past Surgical History:  She has a past surgical history that includes Carpal tunnel release (Left); Tonsillectomy; Cataract extraction; Breast surgery; and NM injection.     Problem List:  Problem List[]Expand by Default       Patient Active Problem List   Diagnosis    Sepsis with encephalopathy without septic shock, due to unspecified organism (Multi)    UTI (urinary tract infection)    Influenza A    Acute hypoxic respiratory failure (Multi)    History of COPD    (HFpEF) heart failure with preserved ejection fraction    Pulmonary HTN (Multi)    AS (aortic stenosis)    Altered mental status    Elevated troponin I level            Social History:  She reports that she has quit smoking. Her smoking use included cigarettes. She does not have any smokeless tobacco history on file. She reports that she does not currently use alcohol. She reports that she does not use drugs.         Objective Data:  Last Recorded Vitals:  Vitals:    02/25/25 0534 02/25/25 0600 02/25/25 0700 02/25/25 0800   BP:  122/62 128/66 126/66    BP Location:    Left arm   Patient Position:    Lying   Pulse:  82 86 90   Resp:  24 (!) 27 (!) 29   Temp:    (!) 38 °C (100.4 °F)   TempSrc:    Temporal   SpO2:  97% 96% 96%   Weight: 84.7 kg (186 lb 11.7 oz)      Height:           Last Labs:  CBC - 2/25/2025:  6:03 AM  9.5 11.1 218    38.2      CMP - 2/25/2025:  6:03 AM  9.8 6.6 18 --- 0.5   _ 3.1 18 73      PTT - No results in last year.  _   _ _     TROPHS   Date/Time Value Ref Range Status   02/24/2025 11:36 AM 64 0 - 13 ng/L Final   02/22/2025 05:06 PM 39 0 - 13 ng/L Final   02/22/2025 05:10 AM 38 0 - 13 ng/L Final     BNP   Date/Time Value Ref Range Status   02/21/2025 11:17  0 - 99 pg/mL Final     HGBA1C   Date/Time Value Ref Range Status   04/30/2024 08:41 AM 6.4 4.3 - 5.6 % Final     Comment:     American Diabetes Association guidelines indicate that patients with HgbA1c in the range 5.7-6.4% are at increased risk for development of diabetes, and intervention by lifestyle modification may be beneficial. HgbA1c greater or equal to 6.5% is considered diagnostic of diabetes.   11/08/2023 08:05 AM 7.6 4.3 - 5.6 % Final     Comment:     American Diabetes Association guidelines indicate that patients with HgbA1c in the range 5.7-6.4% are at increased risk for development of diabetes, and intervention by lifestyle modification may be beneficial. HgbA1c greater or equal to 6.5% is considered diagnostic of diabetes.      Last I/O:  I/O last 3 completed shifts:  In: 800 (9.4 mL/kg) [I.V.:800 (9.4 mL/kg)]  Out: 1700 (20.1 mL/kg) [Urine:1700 (0.6 mL/kg/hr)]  Weight: 84.7 kg     Past Cardiology Tests (Last 3 Years):  EKG:  Electrocardiogram, 12-lead PRN ACS symptoms 02/24/2025 (Preliminary)      ECG 12 Lead 02/23/2025 (Preliminary)      ECG 12 Lead 02/23/2025 (Preliminary)      Electrocardiogram, 12-lead PRN ACS symptoms 02/21/2025 (Preliminary)    Echo:  Transthoracic Echo (TTE) Complete 02/24/2025    Ejection Fractions:  EF   Date/Time Value Ref Range Status    02/24/2025 02:53 PM 53 %      Cath:  No results found for this or any previous visit from the past 1095 days.    Stress Test:  No results found for this or any previous visit from the past 1095 days.    Cardiac Imaging:  No results found for this or any previous visit from the past 1095 days.      Inpatient Medications:  Scheduled medications   Medication Dose Route Frequency    aspirin  81 mg oral Daily    [Held by provider] atorvastatin  80 mg oral Nightly    [Held by provider] baclofen  5 mg oral BID    cholecalciferol  2,000 Units oral Daily    enoxaparin  1 mg/kg subcutaneous q12h    furosemide  20 mg intravenous Daily    [Held by provider] furosemide  20 mg oral Daily    [Held by provider] gabapentin  100 mg oral BID    insulin lispro  0-10 Units subcutaneous q4h    ipratropium-albuteroL  3 mL nebulization TID    lidocaine  1 Application Topical Once    lubricating eye drops  1 drop Both Eyes BID    [Held by provider] lurasidone  120 mg oral Daily with breakfast    metoprolol  5 mg intravenous q6h    [Held by provider] metoprolol tartrate  25 mg oral BID    nystatin  5 mL Swish & Swallow 4x daily    oseltamivir  30 mg oral BID    pantoprazole  40 mg oral Daily before breakfast    Or    pantoprazole  40 mg intravenous Daily before breakfast    piperacillin-tazobactam  3.375 g intravenous q8h    polymyxin B sulf-trimethoprim  1 drop Both Eyes q4h DEMOND    potassium chloride  20 mEq intravenous q2h    potassium chloride CR  10 mEq oral Daily    [Held by provider] vortioxetine  20 mg oral Daily     PRN medications   Medication    acetaminophen    Or    acetaminophen    albuterol    alum-mag hydroxide-simeth    bisacodyl    dextrose    dextrose    [Held by provider] diazePAM    glucagon    glucagon    [Held by provider] ipratropium    magnesium hydroxide    melatonin    ondansetron ODT    Or    ondansetron    oxygen    oxygen    polyethylene glycol     Continuous Medications   Medication Dose Last Rate     amiodarone  0.5 mg/min 0.5 mg/min (02/25/25 0525)       Results for orders placed or performed during the hospital encounter of 02/21/25 (from the past 96 hours)   BLOOD GAS VENOUS FULL PANEL   Result Value Ref Range    POCT pH, Venous 7.46 (H) 7.33 - 7.43 pH    POCT pCO2, Venous 42 41 - 51 mm Hg    POCT pO2, Venous 74 (H) 35 - 45 mm Hg    POCT SO2, Venous 96 (H) 45 - 75 %    POCT Oxy Hemoglobin, Venous 93.5 (H) 45.0 - 75.0 %    POCT Hematocrit Calculated, Venous 35.0 (L) 36.0 - 46.0 %    POCT Sodium, Venous 134 (L) 136 - 145 mmol/L    POCT Potassium, Venous 4.8 3.5 - 5.3 mmol/L    POCT Chloride, Venous 103 98 - 107 mmol/L    POCT Ionized Calicum, Venous 1.38 (H) 1.10 - 1.33 mmol/L    POCT Glucose, Venous 349 (H) 74 - 99 mg/dL    POCT Lactate, Venous 1.3 0.4 - 2.0 mmol/L    POCT Base Excess, Venous 5.5 (H) -2.0 - 3.0 mmol/L    POCT HCO3 Calculated, Venous 29.9 (H) 22.0 - 26.0 mmol/L    POCT Hemoglobin, Venous 11.7 (L) 12.0 - 16.0 g/dL    POCT Anion Gap, Venous 6.0 (L) 10.0 - 25.0 mmol/L    Patient Temperature      FiO2 21 %   Troponin I, High Sensitivity   Result Value Ref Range    Troponin I, High Sensitivity 43 (H) 0 - 13 ng/L   Urinalysis with Reflex Culture and Microscopic   Result Value Ref Range    Color, Urine Orange (N) Light-Yellow, Yellow, Dark-Yellow    Appearance, Urine Ex.Turbid (N) Clear    Specific Gravity, Urine 1.021 1.005 - 1.035    pH, Urine 6.5 5.0, 5.5, 6.0, 6.5, 7.0, 7.5, 8.0    Protein, Urine 200 (2+) (A) NEGATIVE, 10 (TRACE), 20 (TRACE) mg/dL    Glucose, Urine Normal Normal mg/dL    Blood, Urine OVER (3+) (A) NEGATIVE mg/dL    Ketones, Urine NEGATIVE NEGATIVE mg/dL    Bilirubin, Urine NEGATIVE NEGATIVE mg/dL    Urobilinogen, Urine 2 (1+) (A) Normal mg/dL    Nitrite, Urine NEGATIVE NEGATIVE    Leukocyte Esterase, Urine 500 Siddhartha/uL (A) NEGATIVE   Extra Urine Gray Tube   Result Value Ref Range    Extra Tube Hold for add-ons.    Microscopic Only, Urine   Result Value Ref Range    WBC, Urine >50  (A) 1-5, NONE /HPF    WBC Clumps, Urine MANY Reference range not established. /HPF    RBC, Urine >20 (A) NONE, 1-2, 3-5 /HPF    Squamous Epithelial Cells, Urine 1-9 (SPARSE) Reference range not established. /HPF    Bacteria, Urine 4+ (A) NONE SEEN /HPF    Budding Yeast, Urine PRESENT (A) NONE /HPF    Mucus, Urine 2+ Reference range not established. /LPF   Urine Culture    Specimen: Clean Catch/Voided; Urine   Result Value Ref Range    Urine Culture       Clinically insignificant growth based on current clinical standards.   Legionella Antigen, Urine    Specimen: Clean Catch/Voided; Urine   Result Value Ref Range    L. pneumophila Urine Ag Negative Negative   Streptococcus pneumoniae Antigen, Urine    Specimen: Clean Catch/Voided; Urine   Result Value Ref Range    Streptococcus pneumoniae Ag, Urine Negative Negative   Sars-CoV-2 and Influenza A/B PCR   Result Value Ref Range    Flu A Result Detected (A) Not Detected    Flu B Result Not Detected Not Detected    Coronavirus 2019, PCR Not Detected Not Detected   POCT GLUCOSE   Result Value Ref Range    POCT Glucose 340 (H) 74 - 99 mg/dL   Procalcitonin   Result Value Ref Range    Procalcitonin 1.75 (H) <=0.07 ng/mL   Troponin I, High Sensitivity   Result Value Ref Range    Troponin I, High Sensitivity 43 (H) 0 - 13 ng/L   POCT GLUCOSE   Result Value Ref Range    POCT Glucose 288 (H) 74 - 99 mg/dL   POCT GLUCOSE   Result Value Ref Range    POCT Glucose 280 (H) 74 - 99 mg/dL   POCT GLUCOSE   Result Value Ref Range    POCT Glucose 211 (H) 74 - 99 mg/dL   Basic Metabolic Panel   Result Value Ref Range    Glucose 208 (H) 74 - 99 mg/dL    Sodium 139 136 - 145 mmol/L    Potassium 3.9 3.5 - 5.3 mmol/L    Chloride 102 98 - 107 mmol/L    Bicarbonate 27 21 - 32 mmol/L    Anion Gap 14 10 - 20 mmol/L    Urea Nitrogen 32 (H) 6 - 23 mg/dL    Creatinine 1.24 (H) 0.50 - 1.05 mg/dL    eGFR 48 (L) >60 mL/min/1.73m*2    Calcium 10.3 8.6 - 10.3 mg/dL   Magnesium   Result Value Ref Range     Magnesium 2.13 1.60 - 2.40 mg/dL   Troponin I, High Sensitivity   Result Value Ref Range    Troponin I, High Sensitivity 38 (H) 0 - 13 ng/L   CBC   Result Value Ref Range    WBC 10.3 4.4 - 11.3 x10*3/uL    nRBC 0.0 0.0 - 0.0 /100 WBCs    RBC 4.24 4.00 - 5.20 x10*6/uL    Hemoglobin 11.2 (L) 12.0 - 16.0 g/dL    Hematocrit 38.0 36.0 - 46.0 %    MCV 90 80 - 100 fL    MCH 26.4 26.0 - 34.0 pg    MCHC 29.5 (L) 32.0 - 36.0 g/dL    RDW 18.2 (H) 11.5 - 14.5 %    Platelets 175 150 - 450 x10*3/uL   POCT GLUCOSE   Result Value Ref Range    POCT Glucose 164 (H) 74 - 99 mg/dL   POCT GLUCOSE   Result Value Ref Range    POCT Glucose 135 (H) 74 - 99 mg/dL   POCT GLUCOSE   Result Value Ref Range    POCT Glucose 145 (H) 74 - 99 mg/dL   POCT GLUCOSE   Result Value Ref Range    POCT Glucose 150 (H) 74 - 99 mg/dL   Blood Gas Arterial Full Panel   Result Value Ref Range    POCT pH, Arterial 7.45 (H) 7.38 - 7.42 pH    POCT pCO2, Arterial 45 (H) 38 - 42 mm Hg    POCT pO2, Arterial 94 85 - 95 mm Hg    POCT SO2, Arterial 99 94 - 100 %    POCT Oxy Hemoglobin, Arterial 96.6 94.0 - 98.0 %    POCT Hematocrit Calculated, Arterial 37.0 36.0 - 46.0 %    POCT Sodium, Arterial 141 136 - 145 mmol/L    POCT Potassium, Arterial 3.6 3.5 - 5.3 mmol/L    POCT Chloride, Arterial 105 98 - 107 mmol/L    POCT Ionized Calcium, Arterial 1.43 (H) 1.10 - 1.33 mmol/L    POCT Glucose, Arterial 164 (H) 74 - 99 mg/dL    POCT Lactate, Arterial 0.7 0.4 - 2.0 mmol/L    POCT Base Excess, Arterial 6.4 (H) -2.0 - 3.0 mmol/L    POCT HCO3 Calculated, Arterial 31.3 (H) 22.0 - 26.0 mmol/L    POCT Hemoglobin, Arterial 12.2 12.0 - 16.0 g/dL    POCT Anion Gap, Arterial 8 (L) 10 - 25 mmo/L    Patient Temperature      FiO2 36 %   CBC and Auto Differential   Result Value Ref Range    WBC 8.5 4.4 - 11.3 x10*3/uL    nRBC 0.0 0.0 - 0.0 /100 WBCs    RBC 4.05 4.00 - 5.20 x10*6/uL    Hemoglobin 10.7 (L) 12.0 - 16.0 g/dL    Hematocrit 35.2 (L) 36.0 - 46.0 %    MCV 87 80 - 100 fL    MCH 26.4  26.0 - 34.0 pg    MCHC 30.4 (L) 32.0 - 36.0 g/dL    RDW 18.2 (H) 11.5 - 14.5 %    Platelets 188 150 - 450 x10*3/uL    Neutrophils % 79.9 40.0 - 80.0 %    Immature Granulocytes %, Automated 0.4 0.0 - 0.9 %    Lymphocytes % 8.3 13.0 - 44.0 %    Monocytes % 10.9 2.0 - 10.0 %    Eosinophils % 0.1 0.0 - 6.0 %    Basophils % 0.4 0.0 - 2.0 %    Neutrophils Absolute 6.77 1.20 - 7.70 x10*3/uL    Immature Granulocytes Absolute, Automated 0.03 0.00 - 0.70 x10*3/uL    Lymphocytes Absolute 0.70 (L) 1.20 - 4.80 x10*3/uL    Monocytes Absolute 0.92 0.10 - 1.00 x10*3/uL    Eosinophils Absolute 0.01 0.00 - 0.70 x10*3/uL    Basophils Absolute 0.03 0.00 - 0.10 x10*3/uL   Comprehensive metabolic panel   Result Value Ref Range    Glucose 153 (H) 74 - 99 mg/dL    Sodium 145 136 - 145 mmol/L    Potassium 3.5 3.5 - 5.3 mmol/L    Chloride 105 98 - 107 mmol/L    Bicarbonate 29 21 - 32 mmol/L    Anion Gap 15 10 - 20 mmol/L    Urea Nitrogen 32 (H) 6 - 23 mg/dL    Creatinine 1.21 (H) 0.50 - 1.05 mg/dL    eGFR 49 (L) >60 mL/min/1.73m*2    Calcium 9.9 8.6 - 10.3 mg/dL    Albumin 3.1 (L) 3.4 - 5.0 g/dL    Alkaline Phosphatase 73 33 - 136 U/L    Total Protein 6.6 6.4 - 8.2 g/dL    AST 18 9 - 39 U/L    Bilirubin, Total 0.5 0.0 - 1.2 mg/dL    ALT 18 7 - 45 U/L   Troponin I, High Sensitivity   Result Value Ref Range    Troponin I, High Sensitivity 39 (H) 0 - 13 ng/L   TSH with reflex to Free T4 if abnormal   Result Value Ref Range    Thyroid Stimulating Hormone 1.03 0.44 - 3.98 mIU/L   Ammonia   Result Value Ref Range    Ammonia 20 16 - 53 umol/L   Lactate   Result Value Ref Range    Lactate 0.8 0.4 - 2.0 mmol/L   Magnesium   Result Value Ref Range    Magnesium 2.04 1.60 - 2.40 mg/dL   POCT GLUCOSE   Result Value Ref Range    POCT Glucose 142 (H) 74 - 99 mg/dL   POCT GLUCOSE   Result Value Ref Range    POCT Glucose 138 (H) 74 - 99 mg/dL   ECG 12 Lead   Result Value Ref Range    Ventricular Rate 114 BPM    Atrial Rate 114 BPM    NJ Interval 148 ms     QRS Duration 84 ms    QT Interval 326 ms    QTC Calculation(Bazett) 449 ms    P Axis 81 degrees    R Axis -56 degrees    T Axis 73 degrees    QRS Count 18 beats    Q Onset 215 ms    P Onset 141 ms    P Offset 194 ms    T Offset 378 ms    QTC Fredericia 403 ms   ECG 12 Lead   Result Value Ref Range    Ventricular Rate 110 BPM    Atrial Rate 110 BPM    VT Interval 152 ms    QRS Duration 74 ms    QT Interval 342 ms    QTC Calculation(Bazett) 462 ms    P Axis 67 degrees    R Axis -56 degrees    T Axis 54 degrees    QRS Count 19 beats    Q Onset 217 ms    P Onset 141 ms    P Offset 196 ms    T Offset 388 ms    QTC Fredericia 418 ms   POCT GLUCOSE   Result Value Ref Range    POCT Glucose 125 (H) 74 - 99 mg/dL   CBC   Result Value Ref Range    WBC 8.1 4.4 - 11.3 x10*3/uL    nRBC 0.0 0.0 - 0.0 /100 WBCs    RBC 4.30 4.00 - 5.20 x10*6/uL    Hemoglobin 11.3 (L) 12.0 - 16.0 g/dL    Hematocrit 37.8 36.0 - 46.0 %    MCV 88 80 - 100 fL    MCH 26.3 26.0 - 34.0 pg    MCHC 29.9 (L) 32.0 - 36.0 g/dL    RDW 18.4 (H) 11.5 - 14.5 %    Platelets 216 150 - 450 x10*3/uL   Basic Metabolic Panel   Result Value Ref Range    Glucose 130 (H) 74 - 99 mg/dL    Sodium 146 (H) 136 - 145 mmol/L    Potassium 3.9 3.5 - 5.3 mmol/L    Chloride 106 98 - 107 mmol/L    Bicarbonate 28 21 - 32 mmol/L    Anion Gap 16 10 - 20 mmol/L    Urea Nitrogen 30 (H) 6 - 23 mg/dL    Creatinine 1.05 0.50 - 1.05 mg/dL    eGFR 58 (L) >60 mL/min/1.73m*2    Calcium 10.2 8.6 - 10.3 mg/dL   Magnesium   Result Value Ref Range    Magnesium 2.12 1.60 - 2.40 mg/dL   POCT GLUCOSE   Result Value Ref Range    POCT Glucose 127 (H) 74 - 99 mg/dL   POCT GLUCOSE   Result Value Ref Range    POCT Glucose 174 (H) 74 - 99 mg/dL   POCT GLUCOSE   Result Value Ref Range    POCT Glucose 185 (H) 74 - 99 mg/dL   POCT GLUCOSE   Result Value Ref Range    POCT Glucose 152 (H) 74 - 99 mg/dL   Blood Gas Arterial   Result Value Ref Range    POCT pH, Arterial 7.47 (H) 7.38 - 7.42 pH    POCT pCO2, Arterial  43 (H) 38 - 42 mm Hg    POCT pO2, Arterial 79 (L) 85 - 95 mm Hg    POCT SO2, Arterial 98 94 - 100 %    POCT Oxy Hemoglobin, Arterial 95.2 94.0 - 98.0 %    POCT Base Excess, Arterial 6.8 (H) -2.0 - 3.0 mmol/L    POCT HCO3 Calculated, Arterial 31.3 (H) 22.0 - 26.0 mmol/L    Patient Temperature      FiO2 40 %    Apparatus CANNULA     Site of Arterial Puncture Radial Right     Doron's Test Positive    POCT GLUCOSE   Result Value Ref Range    POCT Glucose 147 (H) 74 - 99 mg/dL   POCT GLUCOSE   Result Value Ref Range    POCT Glucose 165 (H) 74 - 99 mg/dL   CBC   Result Value Ref Range    WBC 9.0 4.4 - 11.3 x10*3/uL    nRBC 0.0 0.0 - 0.0 /100 WBCs    RBC 4.46 4.00 - 5.20 x10*6/uL    Hemoglobin 11.6 (L) 12.0 - 16.0 g/dL    Hematocrit 39.6 36.0 - 46.0 %    MCV 89 80 - 100 fL    MCH 26.0 26.0 - 34.0 pg    MCHC 29.3 (L) 32.0 - 36.0 g/dL    RDW 18.6 (H) 11.5 - 14.5 %    Platelets 219 150 - 450 x10*3/uL   Basic Metabolic Panel   Result Value Ref Range    Glucose 173 (H) 74 - 99 mg/dL    Sodium 149 (H) 136 - 145 mmol/L    Potassium 3.6 3.5 - 5.3 mmol/L    Chloride 108 (H) 98 - 107 mmol/L    Bicarbonate 28 21 - 32 mmol/L    Anion Gap 17 10 - 20 mmol/L    Urea Nitrogen 37 (H) 6 - 23 mg/dL    Creatinine 1.14 (H) 0.50 - 1.05 mg/dL    eGFR 53 (L) >60 mL/min/1.73m*2    Calcium 10.6 (H) 8.6 - 10.3 mg/dL   Magnesium   Result Value Ref Range    Magnesium 2.38 1.60 - 2.40 mg/dL   POCT GLUCOSE   Result Value Ref Range    POCT Glucose 196 (H) 74 - 99 mg/dL   Electrocardiogram, 12-lead PRN ACS symptoms   Result Value Ref Range    Ventricular Rate 149 BPM    Atrial Rate 166 BPM    QRS Duration 82 ms    QT Interval 306 ms    QTC Calculation(Bazett) 481 ms    R Axis -58 degrees    T Axis 90 degrees    QRS Count 25 beats    Q Onset 217 ms    T Offset 370 ms    QTC Fredericia 414 ms   Troponin I, High Sensitivity   Result Value Ref Range    Troponin I, High Sensitivity 64 (HH) 0 - 13 ng/L   SST TOP   Result Value Ref Range    Extra Tube Hold for  add-ons.    Lactate   Result Value Ref Range    Lactate 1.7 0.4 - 2.0 mmol/L   POCT GLUCOSE   Result Value Ref Range    POCT Glucose 302 (H) 74 - 99 mg/dL   Transthoracic Echo (TTE) Complete   Result Value Ref Range    AV pk mable 2.05 m/s    LV Biplane EF 39 %    LVOT diam 2.26 cm    MV E/A ratio 0.80     Tricuspid annular plane systolic excursion 1.4 cm    AV mn grad 10 mmHg    LV EF 53 %    RV free wall pk S' 11.00 cm/s    RVSP 21.8 mmHg    LVIDd 3.32 cm    Aortic Valve Area by Continuity of Peak Velocity 1.87 cm2    AV pk grad 17 mmHg    Aortic Valve Area by Continuity of VTI 1.95 cm2    LV A4C EF 39.0    POCT GLUCOSE   Result Value Ref Range    POCT Glucose 281 (H) 74 - 99 mg/dL   POCT GLUCOSE   Result Value Ref Range    POCT Glucose 229 (H) 74 - 99 mg/dL   POCT GLUCOSE   Result Value Ref Range    POCT Glucose 293 (H) 74 - 99 mg/dL   POCT GLUCOSE   Result Value Ref Range    POCT Glucose 300 (H) 74 - 99 mg/dL   CBC   Result Value Ref Range    WBC 9.5 4.4 - 11.3 x10*3/uL    nRBC 0.0 0.0 - 0.0 /100 WBCs    RBC 4.30 4.00 - 5.20 x10*6/uL    Hemoglobin 11.1 (L) 12.0 - 16.0 g/dL    Hematocrit 38.2 36.0 - 46.0 %    MCV 89 80 - 100 fL    MCH 25.8 (L) 26.0 - 34.0 pg    MCHC 29.1 (L) 32.0 - 36.0 g/dL    RDW 18.6 (H) 11.5 - 14.5 %    Platelets 218 150 - 450 x10*3/uL   Basic Metabolic Panel   Result Value Ref Range    Glucose 282 (H) 74 - 99 mg/dL    Sodium 143 136 - 145 mmol/L    Potassium 3.3 (L) 3.5 - 5.3 mmol/L    Chloride 105 98 - 107 mmol/L    Bicarbonate 28 21 - 32 mmol/L    Anion Gap 13 10 - 20 mmol/L    Urea Nitrogen 39 (H) 6 - 23 mg/dL    Creatinine 1.11 (H) 0.50 - 1.05 mg/dL    eGFR 54 (L) >60 mL/min/1.73m*2    Calcium 9.8 8.6 - 10.3 mg/dL   Magnesium   Result Value Ref Range    Magnesium 2.08 1.60 - 2.40 mg/dL   POCT GLUCOSE   Result Value Ref Range    POCT Glucose 271 (H) 74 - 99 mg/dL   POCT GLUCOSE   Result Value Ref Range    POCT Glucose 233 (H) 74 - 99 mg/dL         Physical Exam:  Subjective:   Examination:    General Examination:   General Appearance: Obese feeding tube poorly responsive unable to swallow head: normocephalic, atraumatic   Eyes: Anicteric sclera. Pupils are equally round and reactive to light.  Extraocular movements are intact.    Ears: normal   Oral: Cavity: mucosa moist.   Throat: clear.   Neck/Thyroid: neck supple, full range of motion, no cervical lymphadenopathy.   Skin: warm and dry, no suspicious lesions.    Heart: regular rate and rhythm, S1, S2 normal, no murmurs.   Lungs: clear to auscultation bilaterally.   Abdomen: soft, non-tender, non-distended, bowl sound present, normal.   Extremities: no clubbing, no cyanosis, no edema.   Neuro: non-focal, motor strength normal upper lower extremities, sensory exam intact.       Assessment/Plan   I48.91 Atrial fibrillation converted to sinus rhythm with an IV amiodarone  I42.9 HFrEF EF 35 to 40%-now improved greater than 50%  I26.9 Past history pulmonary embolism      Recommend changing amiodarone to oral later when able to swallow for 30 days currently nonresponsive unable to swallow and feeding tube not working so will keep on IV drip 1 more day  In future consider Eliquis instead of aspirin but first long-term monitor in discussion with neurology  Continue high-dose statins  Monitor pulmonary status      Code Status:  Full Code    I spent 60 minutes in the professional and overall care of this patient.        Jose Taylor MD

## 2025-02-25 NOTE — CARE PLAN
Problem: Chronic Conditions and Co-morbidities  Goal: Patient's chronic conditions and co-morbidity symptoms are monitored and maintained or improved  Outcome: Not Progressing   The patient's goals for the shift include      The clinical goals for the shift include Patient will remain hds by end of shift 2/25/25 at 0700.    Outcome: Not Progressing  Goal: No signs of respiratory distress (eg. Use of accessory muscles. Peds grunting)  Outcome: Not Progressing  Goal: Patent airway maintained this shift  Outcome: Not Progressing  Goal: Tolerate pulmonary toileting this shift  Outcome: Not Progressing     Problem: Safety - Adult  Goal: Free from fall injury  Outcome: Not Progressing     Problem: Chronic Conditions and Co-morbidities  Goal: Patient's chronic conditions and co-morbidity symptoms are monitored and maintained or improved  Outcome: Not Progressing     Problem: Nutrition  Goal: Nutrient intake appropriate for maintaining nutritional needs  Outcome: Not Progressing     Problem: Pain - Adult  Goal: Verbalizes/displays adequate comfort level or baseline comfort level  Outcome: Not Progressing     Problem: Discharge Planning  Goal: Discharge to home or other facility with appropriate resources  Outcome: Not Progressing     Problem: Skin  Goal: Participates in plan/prevention/treatment measures  Outcome: Not Progressing  Goal: Prevent/manage excess moisture  Outcome: Not Progressing  Goal: Prevent/minimize sheer/friction injuries  Outcome: Not Progressing  Goal: Promote/optimize nutrition  Outcome: Not Progressing  Goal: Promote skin healing  Outcome: Not Progressing

## 2025-02-26 ENCOUNTER — APPOINTMENT (OUTPATIENT)
Dept: RADIOLOGY | Facility: HOSPITAL | Age: 70
End: 2025-02-26
Payer: MEDICARE

## 2025-02-26 LAB
ANION GAP SERPL CALC-SCNC: 18 MMOL/L (ref 10–20)
BUN SERPL-MCNC: 42 MG/DL (ref 6–23)
CALCIUM SERPL-MCNC: 9.8 MG/DL (ref 8.6–10.3)
CHLORIDE SERPL-SCNC: 107 MMOL/L (ref 98–107)
CO2 SERPL-SCNC: 25 MMOL/L (ref 21–32)
CREAT SERPL-MCNC: 1.5 MG/DL (ref 0.5–1.05)
EGFRCR SERPLBLD CKD-EPI 2021: 38 ML/MIN/1.73M*2
ERYTHROCYTE [DISTWIDTH] IN BLOOD BY AUTOMATED COUNT: 18.5 % (ref 11.5–14.5)
GLUCOSE BLD MANUAL STRIP-MCNC: 183 MG/DL (ref 74–99)
GLUCOSE BLD MANUAL STRIP-MCNC: 196 MG/DL (ref 74–99)
GLUCOSE BLD MANUAL STRIP-MCNC: 212 MG/DL (ref 74–99)
GLUCOSE BLD MANUAL STRIP-MCNC: 213 MG/DL (ref 74–99)
GLUCOSE BLD MANUAL STRIP-MCNC: 226 MG/DL (ref 74–99)
GLUCOSE SERPL-MCNC: 200 MG/DL (ref 74–99)
HCT VFR BLD AUTO: 37.6 % (ref 36–46)
HGB BLD-MCNC: 11.1 G/DL (ref 12–16)
MAGNESIUM SERPL-MCNC: 2.05 MG/DL (ref 1.6–2.4)
MCH RBC QN AUTO: 25.8 PG (ref 26–34)
MCHC RBC AUTO-ENTMCNC: 29.5 G/DL (ref 32–36)
MCV RBC AUTO: 87 FL (ref 80–100)
NRBC BLD-RTO: 0 /100 WBCS (ref 0–0)
PLATELET # BLD AUTO: 234 X10*3/UL (ref 150–450)
POTASSIUM SERPL-SCNC: 3.5 MMOL/L (ref 3.5–5.3)
RBC # BLD AUTO: 4.31 X10*6/UL (ref 4–5.2)
SODIUM SERPL-SCNC: 146 MMOL/L (ref 136–145)
WBC # BLD AUTO: 8 X10*3/UL (ref 4.4–11.3)

## 2025-02-26 PROCEDURE — 74018 RADEX ABDOMEN 1 VIEW: CPT

## 2025-02-26 PROCEDURE — 2500000001 HC RX 250 WO HCPCS SELF ADMINISTERED DRUGS (ALT 637 FOR MEDICARE OP): Performed by: NURSE PRACTITIONER

## 2025-02-26 PROCEDURE — 85027 COMPLETE CBC AUTOMATED: CPT | Performed by: PHYSICIAN ASSISTANT

## 2025-02-26 PROCEDURE — 2500000001 HC RX 250 WO HCPCS SELF ADMINISTERED DRUGS (ALT 637 FOR MEDICARE OP): Performed by: PHYSICIAN ASSISTANT

## 2025-02-26 PROCEDURE — 2500000005 HC RX 250 GENERAL PHARMACY W/O HCPCS: Performed by: INTERNAL MEDICINE

## 2025-02-26 PROCEDURE — 2500000004 HC RX 250 GENERAL PHARMACY W/ HCPCS (ALT 636 FOR OP/ED): Performed by: INTERNAL MEDICINE

## 2025-02-26 PROCEDURE — 99233 SBSQ HOSP IP/OBS HIGH 50: CPT | Performed by: INTERNAL MEDICINE

## 2025-02-26 PROCEDURE — 74018 RADEX ABDOMEN 1 VIEW: CPT | Performed by: RADIOLOGY

## 2025-02-26 PROCEDURE — 2500000002 HC RX 250 W HCPCS SELF ADMINISTERED DRUGS (ALT 637 FOR MEDICARE OP, ALT 636 FOR OP/ED): Performed by: NURSE PRACTITIONER

## 2025-02-26 PROCEDURE — 2500000004 HC RX 250 GENERAL PHARMACY W/ HCPCS (ALT 636 FOR OP/ED): Performed by: NURSE PRACTITIONER

## 2025-02-26 PROCEDURE — 83735 ASSAY OF MAGNESIUM: CPT | Performed by: PHYSICIAN ASSISTANT

## 2025-02-26 PROCEDURE — 2500000004 HC RX 250 GENERAL PHARMACY W/ HCPCS (ALT 636 FOR OP/ED): Performed by: PHYSICIAN ASSISTANT

## 2025-02-26 PROCEDURE — 2500000005 HC RX 250 GENERAL PHARMACY W/O HCPCS: Performed by: PHYSICIAN ASSISTANT

## 2025-02-26 PROCEDURE — 2500000002 HC RX 250 W HCPCS SELF ADMINISTERED DRUGS (ALT 637 FOR MEDICARE OP, ALT 636 FOR OP/ED): Performed by: INTERNAL MEDICINE

## 2025-02-26 PROCEDURE — 2500000002 HC RX 250 W HCPCS SELF ADMINISTERED DRUGS (ALT 637 FOR MEDICARE OP, ALT 636 FOR OP/ED): Performed by: PHYSICIAN ASSISTANT

## 2025-02-26 PROCEDURE — 94640 AIRWAY INHALATION TREATMENT: CPT

## 2025-02-26 PROCEDURE — 2060000001 HC INTERMEDIATE ICU ROOM DAILY

## 2025-02-26 PROCEDURE — 82947 ASSAY GLUCOSE BLOOD QUANT: CPT

## 2025-02-26 PROCEDURE — 80048 BASIC METABOLIC PNL TOTAL CA: CPT | Performed by: PHYSICIAN ASSISTANT

## 2025-02-26 PROCEDURE — 36415 COLL VENOUS BLD VENIPUNCTURE: CPT | Performed by: PHYSICIAN ASSISTANT

## 2025-02-26 RX ORDER — OSELTAMIVIR PHOSPHATE 30 MG/1
30 CAPSULE ORAL 2 TIMES DAILY
Status: DISCONTINUED | OUTPATIENT
Start: 2025-02-26 | End: 2025-02-27

## 2025-02-26 RX ORDER — DEXTROSE MONOHYDRATE 50 MG/ML
100 INJECTION, SOLUTION INTRAVENOUS CONTINUOUS
Status: DISCONTINUED | OUTPATIENT
Start: 2025-02-26 | End: 2025-02-27

## 2025-02-26 RX ORDER — ESOMEPRAZOLE MAGNESIUM 40 MG/1
40 GRANULE, DELAYED RELEASE ORAL
Status: DISCONTINUED | OUTPATIENT
Start: 2025-02-27 | End: 2025-02-27

## 2025-02-26 RX ORDER — METOCLOPRAMIDE HYDROCHLORIDE 5 MG/ML
10 INJECTION INTRAMUSCULAR; INTRAVENOUS ONCE
Status: COMPLETED | OUTPATIENT
Start: 2025-02-26 | End: 2025-02-26

## 2025-02-26 RX ADMIN — PIPERACILLIN SODIUM AND TAZOBACTAM SODIUM 3.38 G: 3; .375 INJECTION, SOLUTION INTRAVENOUS at 10:33

## 2025-02-26 RX ADMIN — POLYMYXIN B SULFATE AND TRIMETHOPRIM 1 DROP: 10000; 1 SOLUTION OPHTHALMIC at 06:03

## 2025-02-26 RX ADMIN — METOPROLOL TARTRATE 5 MG: 5 INJECTION INTRAVENOUS at 06:03

## 2025-02-26 RX ADMIN — IPRATROPIUM BROMIDE AND ALBUTEROL SULFATE 3 ML: 2.5; .5 SOLUTION RESPIRATORY (INHALATION) at 21:09

## 2025-02-26 RX ADMIN — AMIODARONE HYDROCHLORIDE 0.5 MG/MIN: 1.8 INJECTION, SOLUTION INTRAVENOUS at 16:48

## 2025-02-26 RX ADMIN — NYSTATIN 500000 UNITS: 100000 SUSPENSION ORAL at 12:06

## 2025-02-26 RX ADMIN — POLYMYXIN B SULFATE AND TRIMETHOPRIM 1 DROP: 10000; 1 SOLUTION OPHTHALMIC at 17:09

## 2025-02-26 RX ADMIN — POLYMYXIN B SULFATE AND TRIMETHOPRIM 1 DROP: 10000; 1 SOLUTION OPHTHALMIC at 10:33

## 2025-02-26 RX ADMIN — METOCLOPRAMIDE HYDROCHLORIDE 10 MG: 5 INJECTION INTRAMUSCULAR; INTRAVENOUS at 10:46

## 2025-02-26 RX ADMIN — OSELTAMIVIR PHOSPHATE 30 MG: 30 CAPSULE ORAL at 21:44

## 2025-02-26 RX ADMIN — INSULIN LISPRO 2 UNITS: 100 INJECTION, SOLUTION INTRAVENOUS; SUBCUTANEOUS at 04:26

## 2025-02-26 RX ADMIN — Medication 50 L/MIN: at 08:00

## 2025-02-26 RX ADMIN — Medication 50 PERCENT: at 05:09

## 2025-02-26 RX ADMIN — METOPROLOL TARTRATE 5 MG: 5 INJECTION INTRAVENOUS at 00:28

## 2025-02-26 RX ADMIN — NYSTATIN 500000 UNITS: 100000 SUSPENSION ORAL at 21:44

## 2025-02-26 RX ADMIN — NYSTATIN 500000 UNITS: 100000 SUSPENSION ORAL at 16:47

## 2025-02-26 RX ADMIN — Medication 40 L/MIN: at 14:11

## 2025-02-26 RX ADMIN — Medication 40 L/MIN: at 21:09

## 2025-02-26 RX ADMIN — IPRATROPIUM BROMIDE AND ALBUTEROL SULFATE 3 ML: 2.5; .5 SOLUTION RESPIRATORY (INHALATION) at 14:11

## 2025-02-26 RX ADMIN — ASPIRIN 300 MG: 300 SUPPOSITORY RECTAL at 10:33

## 2025-02-26 RX ADMIN — ENOXAPARIN SODIUM 80 MG: 80 INJECTION SUBCUTANEOUS at 21:45

## 2025-02-26 RX ADMIN — POLYVINYL ALCOHOL, POVIDONE 1 DROP: 14; 6 SOLUTION/ DROPS OPHTHALMIC at 09:04

## 2025-02-26 RX ADMIN — INSULIN LISPRO 4 UNITS: 100 INJECTION, SOLUTION INTRAVENOUS; SUBCUTANEOUS at 09:04

## 2025-02-26 RX ADMIN — DEXTROSE MONOHYDRATE 100 ML/HR: 50 INJECTION, SOLUTION INTRAVENOUS at 10:36

## 2025-02-26 RX ADMIN — INSULIN LISPRO 4 UNITS: 100 INJECTION, SOLUTION INTRAVENOUS; SUBCUTANEOUS at 16:58

## 2025-02-26 RX ADMIN — ENOXAPARIN SODIUM 80 MG: 80 INJECTION SUBCUTANEOUS at 10:33

## 2025-02-26 RX ADMIN — AMIODARONE HYDROCHLORIDE 0.5 MG/MIN: 1.8 INJECTION, SOLUTION INTRAVENOUS at 04:27

## 2025-02-26 RX ADMIN — FUROSEMIDE 20 MG: 10 INJECTION, SOLUTION INTRAVENOUS at 09:04

## 2025-02-26 RX ADMIN — POLYMYXIN B SULFATE AND TRIMETHOPRIM 1 DROP: 10000; 1 SOLUTION OPHTHALMIC at 21:45

## 2025-02-26 RX ADMIN — METOPROLOL TARTRATE 25 MG: 25 TABLET, FILM COATED ORAL at 21:44

## 2025-02-26 RX ADMIN — POLYVINYL ALCOHOL, POVIDONE 1 DROP: 14; 6 SOLUTION/ DROPS OPHTHALMIC at 21:44

## 2025-02-26 RX ADMIN — PIPERACILLIN SODIUM AND TAZOBACTAM SODIUM 3.38 G: 3; .375 INJECTION, SOLUTION INTRAVENOUS at 17:09

## 2025-02-26 RX ADMIN — IPRATROPIUM BROMIDE AND ALBUTEROL SULFATE 3 ML: 2.5; .5 SOLUTION RESPIRATORY (INHALATION) at 11:01

## 2025-02-26 RX ADMIN — POLYMYXIN B SULFATE AND TRIMETHOPRIM 1 DROP: 10000; 1 SOLUTION OPHTHALMIC at 14:42

## 2025-02-26 RX ADMIN — INSULIN LISPRO 4 UNITS: 100 INJECTION, SOLUTION INTRAVENOUS; SUBCUTANEOUS at 21:45

## 2025-02-26 RX ADMIN — METOPROLOL TARTRATE 5 MG: 5 INJECTION INTRAVENOUS at 12:06

## 2025-02-26 RX ADMIN — PANTOPRAZOLE SODIUM 40 MG: 40 INJECTION, POWDER, FOR SOLUTION INTRAVENOUS at 06:03

## 2025-02-26 RX ADMIN — INSULIN LISPRO 2 UNITS: 100 INJECTION, SOLUTION INTRAVENOUS; SUBCUTANEOUS at 00:28

## 2025-02-26 RX ADMIN — POLYMYXIN B SULFATE AND TRIMETHOPRIM 1 DROP: 10000; 1 SOLUTION OPHTHALMIC at 02:27

## 2025-02-26 RX ADMIN — INSULIN LISPRO 2 UNITS: 100 INJECTION, SOLUTION INTRAVENOUS; SUBCUTANEOUS at 12:07

## 2025-02-26 RX ADMIN — ATORVASTATIN CALCIUM 80 MG: 80 TABLET, FILM COATED ORAL at 21:44

## 2025-02-26 RX ADMIN — PIPERACILLIN SODIUM AND TAZOBACTAM SODIUM 3.38 G: 3; .375 INJECTION, SOLUTION INTRAVENOUS at 02:28

## 2025-02-26 ASSESSMENT — COGNITIVE AND FUNCTIONAL STATUS - GENERAL
PERSONAL GROOMING: TOTAL
MOBILITY SCORE: 6
HELP NEEDED FOR BATHING: TOTAL
HELP NEEDED FOR BATHING: TOTAL
TURNING FROM BACK TO SIDE WHILE IN FLAT BAD: TOTAL
TOILETING: TOTAL
DAILY ACTIVITIY SCORE: 6
WALKING IN HOSPITAL ROOM: TOTAL
MOVING TO AND FROM BED TO CHAIR: TOTAL
DRESSING REGULAR UPPER BODY CLOTHING: TOTAL
EATING MEALS: TOTAL
PERSONAL GROOMING: TOTAL
DRESSING REGULAR LOWER BODY CLOTHING: TOTAL
MOBILITY SCORE: 6
DAILY ACTIVITIY SCORE: 6
EATING MEALS: TOTAL
DRESSING REGULAR LOWER BODY CLOTHING: TOTAL
TURNING FROM BACK TO SIDE WHILE IN FLAT BAD: TOTAL
TOILETING: TOTAL
MOVING FROM LYING ON BACK TO SITTING ON SIDE OF FLAT BED WITH BEDRAILS: TOTAL
MOVING FROM LYING ON BACK TO SITTING ON SIDE OF FLAT BED WITH BEDRAILS: TOTAL
WALKING IN HOSPITAL ROOM: TOTAL
MOVING TO AND FROM BED TO CHAIR: TOTAL
CLIMB 3 TO 5 STEPS WITH RAILING: TOTAL
CLIMB 3 TO 5 STEPS WITH RAILING: TOTAL
STANDING UP FROM CHAIR USING ARMS: TOTAL
DRESSING REGULAR UPPER BODY CLOTHING: TOTAL
STANDING UP FROM CHAIR USING ARMS: TOTAL

## 2025-02-26 ASSESSMENT — PAIN SCALES - PAIN ASSESSMENT IN ADVANCED DEMENTIA (PAINAD)
BREATHING: NORMAL
CONSOLABILITY: NO NEED TO CONSOLE
FACIALEXPRESSION: SMILING OR INEXPRESSIVE
BODYLANGUAGE: RELAXED
TOTALSCORE: 0

## 2025-02-26 ASSESSMENT — PAIN - FUNCTIONAL ASSESSMENT
PAIN_FUNCTIONAL_ASSESSMENT: 0-10
PAIN_FUNCTIONAL_ASSESSMENT: 0-10
PAIN_FUNCTIONAL_ASSESSMENT: PAINAD (PAIN ASSESSMENT IN ADVANCED DEMENTIA SCALE)
PAIN_FUNCTIONAL_ASSESSMENT: 0-10

## 2025-02-26 ASSESSMENT — PAIN SCALES - GENERAL
PAINLEVEL_OUTOF10: 0 - NO PAIN

## 2025-02-26 NOTE — PROGRESS NOTES
02/26/25 1502   Discharge Planning   Expected Discharge Disposition [facility] will be at the bedside tomorrow   2/27       at  0900  to  get consents  signed    for hospice from two  physicians    Pt can then return to her  LTC at  [facility]    Vanessa Veras, MSW, LSW

## 2025-02-26 NOTE — PROGRESS NOTES
Physical Therapy                 Therapy Communication Note    Patient Name: Adela Barrett  MRN: [id number]  Today's Date: 2/26/2025     Discipline: Physical Therapy    Missed Visit Reason: PT order received, chart reviewed. Pt is not responsive or following commands. Pt not appropriate for therapy services, will sign off.     Missed Time: Attempt

## 2025-02-26 NOTE — PROGRESS NOTES
"Subjective  Patient still not doing well on high flow 50%  Nursing staff was interviewed  Objectives    Last Recorded Vitals  Blood pressure 124/59, pulse 104, temperature 36.6 °C (97.9 °F), temperature source Temporal, resp. rate (!) 33, height 1.651 m (5' 5\"), weight 83.9 kg (184 lb 15.5 oz), SpO2 95%.    Physical Exam  HENT:      Right Ear: External ear normal.      Left Ear: External ear normal.      Mouth/Throat:      Mouth: Mucous membranes are moist.   Cardiovascular:      Rate and Rhythm: Normal rate and regular rhythm.      Heart sounds: No murmur heard.     No friction rub. No gallop.   Pulmonary:      Effort: No accessory muscle usage or respiratory distress.      Breath sounds: No stridor. No wheezing or rhonchi.   Chest:      Chest wall: No tenderness.   Abdominal:      General: There is no distension.      Palpations: There is no mass.      Tenderness: There is no abdominal tenderness. There is no guarding or rebound.   Musculoskeletal:         General: No deformity or signs of injury.      Cervical back: No rigidity or tenderness. Normal range of motion.      Right lower leg: No edema.      Left lower leg: No edema.   Skin:     Coloration: Skin is not jaundiced or pale.      Findings: No lesion.   Neurological:   Respond to pain continue to be lethargic       Labs    No results displayed because visit has over 200 results.            Imaging     XR abdomen 1 view  Narrative: Interpreted By:  Ewa Mcgraw,   STUDY:  XR ABDOMEN 1 VIEW;  2/25/2025 7:52 pm      INDICATION:  Signs/Symptoms:corpak placement.      COMPARISON:  02/24/2025      ACCESSION NUMBER(S):  [id number]      ORDERING CLINICIAN:  JONAS PRESLEY      FINDINGS:  The feeding tube courses below the diaphragm, curves in the region of  the gastric body, with the tip near the gastric fundus. Nonspecific  mild colonic distension, similar to prior. The lung bases are clear.      Impression: 1.  The feeding tube courses below the " diaphragm, curves in the  region of the gastric body, with the tip near the gastric fundus.      MACRO:  None      Signed by: Ewa Mcgraw 2/25/2025 8:14 PM  Dictation workstation:   [id number]  XR chest 1 view  Narrative: Interpreted By:  Jefferson Bull,   STUDY:  XR CHEST 1 VIEW;  2/25/2025 10:09 am      INDICATION:  Signs/Symptoms:sob.      COMPARISON:  02/23/2025      ACCESSION NUMBER(S):  [id number]      ORDERING CLINICIAN:  CHIN YANG      FINDINGS:  Increased bibasilar opacities. Cardiomediastinal silhouette  unchanged. Unchanged mild pulmonary vascular congestion. Feeding tube  partially imaged.      Impression: Increased bibasilar atelectasis or infiltrates.      MACRO:  None      Signed by: Jefferson Bull 2/25/2025 10:24 AM  Dictation workstation:   [id number]       Patient Active Problem List   Diagnosis    Sepsis with encephalopathy without septic shock, due to unspecified organism (Multi)    UTI (urinary tract infection)    Influenza A    Acute hypoxic respiratory failure (Multi)    History of COPD    (HFpEF) heart failure with preserved ejection fraction    Pulmonary HTN (Multi)    AS (aortic stenosis)    Altered mental status    Elevated troponin I level         Assessment/Plan   Assessment & Plan  Sepsis with encephalopathy without septic shock, due to unspecified organism (Multi)    UTI (urinary tract infection)    Influenza A    Acute hypoxic respiratory failure (Multi)    History of COPD    (HFpEF) heart failure with preserved ejection fraction    Pulmonary HTN (Multi)    AS (aortic stenosis)    Altered mental status    Elevated troponin I level  Acute respiratory failure still requiring high amount of oxygen aspiration pneumonia continue with antibiotics  Acute metabolic encephalopathy history of stroke patient has poor prognosis related to her multiple system failure and poor quality of life history of CVA with left-sided weakness patient is bedbound normally palliative care note was  reviewed  Waiting for ethics committee to decide about CODE STATUS and possible hospice care  History of influenza and UTI Corpak is not functioning well still coiling           I spent 25 minutes in the professional and overall care of this patient.      CHIN Mercer MD

## 2025-02-26 NOTE — PROGRESS NOTES
Ethics Consultation  Adela Barrett MRN:[id number]  Requested By: Lia Millan, , JORGEW   Reason: Code Status  February 25. 2025    Past Medical History  CVA (cerebral vascular accident) (Multi) [I63.9]    CKD (chronic kidney disease) [N18.9]    Lung cancer (Multi) [C34.90]    Diabetes mellitus (Multi) [E11.9]    Bipolar 1 disorder (Multi) [F31.9]    CHF (congestive heart failure) [I50.9]    COPD (chronic obstructive pulmonary disease) (Multi) [J44.9]    (HFpEF) heart failure with preserved ejection fraction [I50.30]    Pulmonary HTN (Multi) [I27.20]    Hemiplegia and hemiparesis following cerebral infarction affecting left dominant side (Multi)   CKD (chronic kidney disease), stage II [N18.2]    HTN (hypertension) [I10]    HLD (hyperlipidemia) [E78.5]    Non-small cell lung cancer (Multi) [C34.90] Left lower lobe dx 2019 got chemo and RT  History of chemotherapy [Z92.21]    History of therapeutic radiation [Z92.3]    Anxiety and depression [F41.9, F32.A]    Pulmonary air embolism (Multi) [T79.0XXA]    Type II diabetes mellitus (Multi) [E11.9]    Spinal stenosis [M48.00]  Carpal tunnel syndrome [G56.00]    UTI (urinary tract infection) [N39.0]    PNA (pneumonia) [J18.9]    Contracture of left elbow [M24.522]    Neuropathy [G62.9]    Mild aortic stenosis [I35.0]    Carotid artery disease (CMS-HCC) [I77.9]    Intracranial atherosclerosis [I67.2]    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]    Calculus of gallbladder without cholecystitis [K80.20]    Cervical spondylosis [M47.812]    Osteopenia [M85.80]    Prolonged QT interval [R94.31]    Past Surgical History  Carpal tunnel release [YQC211] Left    Tonsillectomy [NVO6127]     Cataract extraction [YIS7175]     Breast surgery [QXN182] breast reduction  NM injection [GRD1648]       Assessment/Plan  Sepsis with encephalopathy without septic shock, due to unspecified organism (Multi)  UTI (urinary tract infection)  Influenza A   Acute hypoxic  respiratory failure (Multi)   History of COPD   (HFpEF) heart failure with preserved ejection fraction   Pulmonary HTN (Multi)   AS (aortic stenosis)   Altered mental status   Elevated troponin I level  Acute respiratory failure still requiring high amount of oxygen aspiration pneumonia continue with antibiotics  Acute metabolic encephalopathy history of stroke patient has poor prognosis related to her multiple system failure and poor quality of life history of CVA with left-sided weakness patient is bedbound normally palliative care note was reviewed  History of influenza and UTI Corpak is not functioning well still coiling    Reviewing  her present prognosis, Adela Barrett has no family, and no Advance Directives  Her  Martell is the one contact we have who can speak to the patient's values and wishes.  He states that Code Status DNRCC is consistent with her goals of care.    Physicians are not obligated to provide Non-Beneficent care, an d indeed are bound to do no harm, especially if this is in the patient's best interest and consistent with tier values.     Recommendations:  1. Doctors are not obliged to provide Non-beneficent and potentially harmful care to the patient, and indeed are ethically bound not to do harm.  2. Doctors are obligated to do what they consider is in the best interest of the patient and to not administer possibly harmful procedures.  3. This is explicitly stated in  Hospital Policy CP-104, and in the Ethical and [employer] of [employer]  4. It is recommended pursuant to  Policy CP-104 that the Patient's Code Status be changed to DNR-CCA by the Attending Physician and one of his colleagues on the case, and to start comfort care here or back at the [facility].    She cannot get it through Hospice Care because she has no one to sign for her.    Chaplain Ankush Navas  [facility] Ethics Committee

## 2025-02-26 NOTE — PROGRESS NOTES
Occupational Therapy                 Therapy Communication Note    Patient Name: Adela Barrett  MRN: [id number]  Department: [facility] 2  Room: 2105/2105-A  Today's Date: 2/26/2025     Discipline: Occupational Therapy    Missed Visit Reason:  Chart review completed. Patient is not responsive and not following commands. Patient is currently not appropriate for therapy services. Will discharge OT order at this time. Please re-consult should patients condition improve during acute stay.     Missed Time: Attempt

## 2025-02-26 NOTE — CARE PLAN
Problem: Respiratory  Goal: Clear secretions with interventions this shift  Outcome: Progressing  Goal: No signs of respiratory distress (eg. Use of accessory muscles. Peds grunting)  Outcome: Progressing  Goal: Patent airway maintained this shift  Outcome: Progressing  Goal: Tolerate pulmonary toileting this shift  Outcome: Progressing     Problem: Safety - Adult  Goal: Free from fall injury  Outcome: Progressing     Problem: Chronic Conditions and Co-morbidities  Goal: Patient's chronic conditions and co-morbidity symptoms are monitored and maintained or improved  Outcome: Progressing     Problem: Nutrition  Goal: Nutrient intake appropriate for maintaining nutritional needs  Outcome: Progressing     Problem: Pain - Adult  Goal: Verbalizes/displays adequate comfort level or baseline comfort level  Outcome: Progressing     Problem: Discharge Planning  Goal: Discharge to home or other facility with appropriate resources  Outcome: Progressing     Problem: Skin  Goal: Participates in plan/prevention/treatment measures  Outcome: Progressing  Goal: Prevent/manage excess moisture  Outcome: Progressing  Goal: Prevent/minimize sheer/friction injuries  Outcome: Progressing  Goal: Promote/optimize nutrition  Outcome: Progressing  Goal: Promote skin healing  Outcome: Progressing   The patient's goals for the shift include      The clinical goals for the shift include patient will maintain a pulse ox greater than 92% this shift  Able to wean patient to 40L high flow.  Pulse ox 97% all shift.

## 2025-02-26 NOTE — CARE PLAN
SHIFT NOTE    Patient's mentation remains the same. She is still very drowsy/sedated and not following commands. Per NP, Corpak is still not in the correct place and will need to be reinserted. She's in HFNC 50 L, 50% and sating well. Amio drip @ 0.5 mg/min with heart rates in the 's.     Hourly rounding was performed and patient needs were met. Call light within reach. No other acute events, will continue to monitor.       The patient's goals for the shift include    Problem: Respiratory  Goal: Clear secretions with interventions this shift  2/26/2025 0540 by Liyah Palomares RN  Outcome: Progressing  2/25/2025 2037 by Liyah Palomares RN  Flowsheets (Taken 2/23/2025 2039)  Clear secretions with interventions this shift:   Encourage/provide pulmonary hygiene/secretion clearance   Med administration/monitoring of effect   Suctioning     Problem: Respiratory  Goal: No signs of respiratory distress (eg. Use of accessory muscles. Peds grunting)  Outcome: Progressing     Problem: Chronic Conditions and Co-morbidities  Goal: Patient's chronic conditions and co-morbidity symptoms are monitored and maintained or improved  2/26/2025 0540 by Liyah Palomares RN  Outcome: Progressing  2/25/2025 2037 by Liyah Palomares RN  Flowsheets (Taken 2/23/2025 2039)  Care Plan - Patient's Chronic Conditions and Co-Morbidity Symptoms are Monitored and Maintained or Improved:   Monitor and assess patient's chronic conditions and comorbid symptoms for stability, deterioration, or improvement   Collaborate with multidisciplinary team to address chronic and comorbid conditions and prevent exacerbation or deterioration   Update acute care plan with appropriate goals if chronic or comorbid symptoms are exacerbated and prevent overall improvement and discharge     Problem: Chronic Conditions and Co-morbidities  Goal: Patient's chronic conditions and co-morbidity symptoms are monitored and maintained or improved  2/26/2025 0540 by Liyah Palomares  RN  Outcome: Progressing  2/25/2025 2037 by Liyah Palomares RN  Flowsheets (Taken 2/23/2025 2039)  Care Plan - Patient's Chronic Conditions and Co-Morbidity Symptoms are Monitored and Maintained or Improved:   Monitor and assess patient's chronic conditions and comorbid symptoms for stability, deterioration, or improvement   Collaborate with multidisciplinary team to address chronic and comorbid conditions and prevent exacerbation or deterioration   Update acute care plan with appropriate goals if chronic or comorbid symptoms are exacerbated and prevent overall improvement and discharge     Problem: Discharge Planning  Goal: Discharge to home or other facility with appropriate resources  2/26/2025 0540 by Liyah Palomares RN  Outcome: Progressing  2/25/2025 2037 by Liyah Palomares RN  Flowsheets (Taken 2/23/2025 2039)  Discharge to home or other facility with appropriate resources:   Identify barriers to discharge with patient and caregiver   Identify discharge learning needs (meds, wound care, etc)     Problem: Skin  Goal: Participates in plan/prevention/treatment measures  2/26/2025 0540 by Liyah Palomares RN  Outcome: Progressing  2/25/2025 2037 by Liyah Palomares RN  Flowsheets (Taken 2/22/2025 2359 by Lia Dotson RN)  Participates in plan/prevention/treatment measures: Elevate heels       The clinical goals for the shift include Patient will remain hds by end of shift 2/26/25 at 0700.

## 2025-02-26 NOTE — PROGRESS NOTES
Speech-Language Pathology                 Therapy Communication Note    Patient Name: Adela Barrett  MRN: [id number]  Department: [facility] 2  Room: 2105/2105-A  Today's Date: 2/26/2025     Discipline: Speech Language Pathology    Missed Visit/Time Reason:  Not responsive. The Clinical Swallow Evaluation remains on hold at this time.     Missed Time: Attempt    Comment:  SLP team spoke with nursing staff this morning. SLP team will follow-up/re-attempt.

## 2025-02-26 NOTE — ASSESSMENT & PLAN NOTE
Acute respiratory failure still requiring high amount of oxygen aspiration pneumonia continue with antibiotics  Acute metabolic encephalopathy history of stroke patient has poor prognosis related to her multiple system failure and poor quality of life history of CVA with left-sided weakness patient is bedbound normally palliative care note was reviewed  Waiting for ethics committee to decide about CODE STATUS and possible hospice care  History of influenza and UTI Corpak is not functioning well still coiling

## 2025-02-26 NOTE — NURSING NOTE
Ethics reviewed case, states it is ethically reasonable for two physicians to change code status to DNRCC given pts previous discussions with her  regarding goals of care and her current condition with a very poor prognosis. Waiting for two MD's to make decision to change code status and start comfort measures inpatient.     1P  Complex care team SW messaged team that [facility] would accept pt with a two MD DNRCC back at [facility]. Phone call placed to [facility] to confirm this, they state that they will accept if two MD's sign their hospice consents, they would then be able to start services at [facility]. Informed team of this, as MD has not changed code status at this time. Discussed plan to have two physician DNRCC in place and starting comfort care here to see how pt does and then possibly send back to [facility] if needed.    2P  House NP requesting to have [facility] consents brought to the hospital tomorrow as she feels the MD will sign a DNRCC and the hospice consents tomorrow. She is also going to discuss with the consulting physicians to see if one will be the co-signer for a DNRCC and hospice consents.    Referral sent to [facility] at NP's request, requesting that [facility] staff come to the hospital in the morning to assess pt and bring consents.      250P  [facility] to send a nurse tomorrow around 9am with consent forms for two MD's to sign.     Palliative will continue to follow

## 2025-02-27 ENCOUNTER — APPOINTMENT (OUTPATIENT)
Dept: RADIOLOGY | Facility: HOSPITAL | Age: 70
End: 2025-02-27
Payer: MEDICARE

## 2025-02-27 VITALS
SYSTOLIC BLOOD PRESSURE: 90 MMHG | TEMPERATURE: 97.3 F | RESPIRATION RATE: 15 BRPM | HEART RATE: 96 BPM | WEIGHT: 184.08 LBS | BODY MASS INDEX: 30.67 KG/M2 | DIASTOLIC BLOOD PRESSURE: 50 MMHG | HEIGHT: 65 IN | OXYGEN SATURATION: 95 %

## 2025-02-27 LAB
ANION GAP SERPL CALC-SCNC: 17 MMOL/L (ref 10–20)
BNP SERPL-MCNC: 107 PG/ML (ref 0–99)
BUN SERPL-MCNC: 47 MG/DL (ref 6–23)
CALCIUM SERPL-MCNC: 9.5 MG/DL (ref 8.6–10.3)
CHLORIDE SERPL-SCNC: 105 MMOL/L (ref 98–107)
CO2 SERPL-SCNC: 25 MMOL/L (ref 21–32)
CREAT SERPL-MCNC: 2.06 MG/DL (ref 0.5–1.05)
EGFRCR SERPLBLD CKD-EPI 2021: 26 ML/MIN/1.73M*2
ERYTHROCYTE [DISTWIDTH] IN BLOOD BY AUTOMATED COUNT: 18.8 % (ref 11.5–14.5)
GLUCOSE BLD MANUAL STRIP-MCNC: 265 MG/DL (ref 74–99)
GLUCOSE BLD MANUAL STRIP-MCNC: 270 MG/DL (ref 74–99)
GLUCOSE BLD MANUAL STRIP-MCNC: 276 MG/DL (ref 74–99)
GLUCOSE BLD MANUAL STRIP-MCNC: 301 MG/DL (ref 74–99)
GLUCOSE SERPL-MCNC: 294 MG/DL (ref 74–99)
HCT VFR BLD AUTO: 37.7 % (ref 36–46)
HGB BLD-MCNC: 11.2 G/DL (ref 12–16)
MAGNESIUM SERPL-MCNC: 2.04 MG/DL (ref 1.6–2.4)
MCH RBC QN AUTO: 26 PG (ref 26–34)
MCHC RBC AUTO-ENTMCNC: 29.7 G/DL (ref 32–36)
MCV RBC AUTO: 88 FL (ref 80–100)
NRBC BLD-RTO: 0.2 /100 WBCS (ref 0–0)
PLATELET # BLD AUTO: 252 X10*3/UL (ref 150–450)
POTASSIUM SERPL-SCNC: 3.1 MMOL/L (ref 3.5–5.3)
RBC # BLD AUTO: 4.3 X10*6/UL (ref 4–5.2)
SODIUM SERPL-SCNC: 144 MMOL/L (ref 136–145)
STAPHYLOCOCCUS SPEC CULT: NORMAL
WBC # BLD AUTO: 9.9 X10*3/UL (ref 4.4–11.3)

## 2025-02-27 PROCEDURE — 2500000004 HC RX 250 GENERAL PHARMACY W/ HCPCS (ALT 636 FOR OP/ED): Mod: JZ | Performed by: NURSE PRACTITIONER

## 2025-02-27 PROCEDURE — 2500000002 HC RX 250 W HCPCS SELF ADMINISTERED DRUGS (ALT 637 FOR MEDICARE OP, ALT 636 FOR OP/ED): Performed by: PHYSICIAN ASSISTANT

## 2025-02-27 PROCEDURE — 2500000004 HC RX 250 GENERAL PHARMACY W/ HCPCS (ALT 636 FOR OP/ED): Performed by: INTERNAL MEDICINE

## 2025-02-27 PROCEDURE — 2500000005 HC RX 250 GENERAL PHARMACY W/O HCPCS: Performed by: INTERNAL MEDICINE

## 2025-02-27 PROCEDURE — 2500000001 HC RX 250 WO HCPCS SELF ADMINISTERED DRUGS (ALT 637 FOR MEDICARE OP)

## 2025-02-27 PROCEDURE — 71045 X-RAY EXAM CHEST 1 VIEW: CPT | Performed by: RADIOLOGY

## 2025-02-27 PROCEDURE — 36415 COLL VENOUS BLD VENIPUNCTURE: CPT | Performed by: PHYSICIAN ASSISTANT

## 2025-02-27 PROCEDURE — 82947 ASSAY GLUCOSE BLOOD QUANT: CPT

## 2025-02-27 PROCEDURE — 2500000001 HC RX 250 WO HCPCS SELF ADMINISTERED DRUGS (ALT 637 FOR MEDICARE OP): Performed by: NURSE PRACTITIONER

## 2025-02-27 PROCEDURE — 2500000002 HC RX 250 W HCPCS SELF ADMINISTERED DRUGS (ALT 637 FOR MEDICARE OP, ALT 636 FOR OP/ED): Performed by: NURSE PRACTITIONER

## 2025-02-27 PROCEDURE — 85027 COMPLETE CBC AUTOMATED: CPT | Performed by: PHYSICIAN ASSISTANT

## 2025-02-27 PROCEDURE — 71045 X-RAY EXAM CHEST 1 VIEW: CPT

## 2025-02-27 PROCEDURE — 83880 ASSAY OF NATRIURETIC PEPTIDE: CPT

## 2025-02-27 PROCEDURE — 83735 ASSAY OF MAGNESIUM: CPT | Performed by: PHYSICIAN ASSISTANT

## 2025-02-27 PROCEDURE — 80048 BASIC METABOLIC PNL TOTAL CA: CPT | Performed by: PHYSICIAN ASSISTANT

## 2025-02-27 PROCEDURE — 2500000004 HC RX 250 GENERAL PHARMACY W/ HCPCS (ALT 636 FOR OP/ED): Mod: JZ

## 2025-02-27 PROCEDURE — 2500000002 HC RX 250 W HCPCS SELF ADMINISTERED DRUGS (ALT 637 FOR MEDICARE OP, ALT 636 FOR OP/ED): Performed by: INTERNAL MEDICINE

## 2025-02-27 PROCEDURE — 94640 AIRWAY INHALATION TREATMENT: CPT

## 2025-02-27 PROCEDURE — 2500000004 HC RX 250 GENERAL PHARMACY W/ HCPCS (ALT 636 FOR OP/ED): Performed by: NURSE PRACTITIONER

## 2025-02-27 PROCEDURE — 2500000005 HC RX 250 GENERAL PHARMACY W/O HCPCS: Performed by: PHYSICIAN ASSISTANT

## 2025-02-27 PROCEDURE — 2500000001 HC RX 250 WO HCPCS SELF ADMINISTERED DRUGS (ALT 637 FOR MEDICARE OP): Performed by: PHYSICIAN ASSISTANT

## 2025-02-27 RX ORDER — ENOXAPARIN SODIUM 100 MG/ML
1 INJECTION SUBCUTANEOUS
Status: DISCONTINUED | OUTPATIENT
Start: 2025-02-28 | End: 2025-02-27

## 2025-02-27 RX ORDER — PANTOPRAZOLE SODIUM 40 MG/10ML
40 INJECTION, POWDER, LYOPHILIZED, FOR SOLUTION INTRAVENOUS
Status: DISCONTINUED | OUTPATIENT
Start: 2025-02-27 | End: 2025-02-27

## 2025-02-27 RX ORDER — LORAZEPAM 0.5 MG/1
0.5 TABLET ORAL EVERY 4 HOURS PRN
Start: 2025-02-27

## 2025-02-27 RX ORDER — OLANZAPINE 5 MG/1
2.5 TABLET ORAL EVERY 6 HOURS PRN
Status: DISCONTINUED | OUTPATIENT
Start: 2025-02-27 | End: 2025-02-27 | Stop reason: HOSPADM

## 2025-02-27 RX ORDER — MORPHINE SULFATE 10 MG/.5ML
5 SOLUTION ORAL
Start: 2025-02-27

## 2025-02-27 RX ORDER — HYOSCYAMINE SULFATE 0.12 MG/1
0.12 TABLET, ORALLY DISINTEGRATING ORAL EVERY 4 HOURS PRN
Status: DISCONTINUED | OUTPATIENT
Start: 2025-02-27 | End: 2025-02-27 | Stop reason: HOSPADM

## 2025-02-27 RX ORDER — OLANZAPINE 5 MG/1
5 TABLET, ORALLY DISINTEGRATING ORAL EVERY 6 HOURS PRN
Start: 2025-02-27

## 2025-02-27 RX ORDER — ACETAMINOPHEN 650 MG/1
650 SUPPOSITORY RECTAL EVERY 4 HOURS PRN
Status: DISCONTINUED | OUTPATIENT
Start: 2025-02-27 | End: 2025-02-27 | Stop reason: HOSPADM

## 2025-02-27 RX ORDER — OSELTAMIVIR PHOSPHATE 30 MG/1
30 CAPSULE ORAL DAILY
Status: DISCONTINUED | OUTPATIENT
Start: 2025-02-28 | End: 2025-02-27

## 2025-02-27 RX ORDER — NAPROXEN SODIUM 220 MG/1
81 TABLET, FILM COATED ORAL DAILY
Status: DISCONTINUED | OUTPATIENT
Start: 2025-02-27 | End: 2025-02-27 | Stop reason: HOSPADM

## 2025-02-27 RX ORDER — HYOSCYAMINE SULFATE 0.12 MG/1
0.12 TABLET, ORALLY DISINTEGRATING ORAL EVERY 4 HOURS PRN
Start: 2025-02-27

## 2025-02-27 RX ORDER — GLYCOPYRROLATE 0.2 MG/ML
0.2 INJECTION INTRAMUSCULAR; INTRAVENOUS EVERY 4 HOURS PRN
Status: DISCONTINUED | OUTPATIENT
Start: 2025-02-27 | End: 2025-02-27 | Stop reason: HOSPADM

## 2025-02-27 RX ORDER — LORAZEPAM 2 MG/ML
1 INJECTION INTRAMUSCULAR ONCE
Status: COMPLETED | OUTPATIENT
Start: 2025-02-27 | End: 2025-02-27

## 2025-02-27 RX ORDER — HYDROMORPHONE HYDROCHLORIDE 1 MG/ML
1 INJECTION, SOLUTION INTRAMUSCULAR; INTRAVENOUS; SUBCUTANEOUS ONCE
Status: COMPLETED | OUTPATIENT
Start: 2025-02-27 | End: 2025-02-27

## 2025-02-27 RX ORDER — POTASSIUM CHLORIDE 1.5 G/1.58G
40 POWDER, FOR SOLUTION ORAL ONCE
Status: COMPLETED | OUTPATIENT
Start: 2025-02-27 | End: 2025-02-27

## 2025-02-27 RX ORDER — OLANZAPINE 2.5 MG/1
2.5 TABLET ORAL EVERY 6 HOURS PRN
Start: 2025-02-27

## 2025-02-27 RX ORDER — LORAZEPAM 0.5 MG/1
0.5 TABLET ORAL EVERY 4 HOURS PRN
Status: DISCONTINUED | OUTPATIENT
Start: 2025-02-27 | End: 2025-02-27 | Stop reason: HOSPADM

## 2025-02-27 RX ORDER — MORPHINE SULFATE 10 MG/.5ML
5 SOLUTION ORAL
Status: DISCONTINUED | OUTPATIENT
Start: 2025-02-27 | End: 2025-02-27 | Stop reason: HOSPADM

## 2025-02-27 RX ORDER — ACETAMINOPHEN 650 MG/1
650 SUPPOSITORY RECTAL EVERY 4 HOURS PRN
Start: 2025-02-27

## 2025-02-27 RX ORDER — OLANZAPINE 5 MG/1
5 TABLET, ORALLY DISINTEGRATING ORAL EVERY 6 HOURS PRN
Status: DISCONTINUED | OUTPATIENT
Start: 2025-02-27 | End: 2025-02-27 | Stop reason: HOSPADM

## 2025-02-27 RX ADMIN — OSELTAMIVIR PHOSPHATE 30 MG: 30 CAPSULE ORAL at 09:31

## 2025-02-27 RX ADMIN — ASPIRIN 81 MG CHEWABLE TABLET 81 MG: 81 TABLET CHEWABLE at 09:30

## 2025-02-27 RX ADMIN — POLYMYXIN B SULFATE AND TRIMETHOPRIM 1 DROP: 10000; 1 SOLUTION OPHTHALMIC at 01:46

## 2025-02-27 RX ADMIN — POTASSIUM CHLORIDE 40 MEQ: 1.5 POWDER, FOR SOLUTION ORAL at 09:33

## 2025-02-27 RX ADMIN — INSULIN LISPRO 6 UNITS: 100 INJECTION, SOLUTION INTRAVENOUS; SUBCUTANEOUS at 09:32

## 2025-02-27 RX ADMIN — Medication 40 L/MIN: at 01:25

## 2025-02-27 RX ADMIN — NYSTATIN 500000 UNITS: 100000 SUSPENSION ORAL at 06:08

## 2025-02-27 RX ADMIN — INSULIN LISPRO 6 UNITS: 100 INJECTION, SOLUTION INTRAVENOUS; SUBCUTANEOUS at 04:16

## 2025-02-27 RX ADMIN — IPRATROPIUM BROMIDE AND ALBUTEROL SULFATE 3 ML: 2.5; .5 SOLUTION RESPIRATORY (INHALATION) at 08:30

## 2025-02-27 RX ADMIN — PIPERACILLIN SODIUM AND TAZOBACTAM SODIUM 3.38 G: 3; .375 INJECTION, SOLUTION INTRAVENOUS at 01:46

## 2025-02-27 RX ADMIN — AMIODARONE HYDROCHLORIDE 0.5 MG/MIN: 1.8 INJECTION, SOLUTION INTRAVENOUS at 06:21

## 2025-02-27 RX ADMIN — FUROSEMIDE 20 MG: 20 TABLET ORAL at 09:31

## 2025-02-27 RX ADMIN — HYDROMORPHONE HYDROCHLORIDE 1 MG: 1 INJECTION, SOLUTION INTRAMUSCULAR; INTRAVENOUS; SUBCUTANEOUS at 14:06

## 2025-02-27 RX ADMIN — POTASSIUM CHLORIDE 10 MEQ: 750 TABLET, EXTENDED RELEASE ORAL at 09:31

## 2025-02-27 RX ADMIN — POLYVINYL ALCOHOL, POVIDONE 1 DROP: 14; 6 SOLUTION/ DROPS OPHTHALMIC at 09:30

## 2025-02-27 RX ADMIN — INSULIN LISPRO 6 UNITS: 100 INJECTION, SOLUTION INTRAVENOUS; SUBCUTANEOUS at 00:41

## 2025-02-27 RX ADMIN — POTASSIUM CHLORIDE 40 MEQ: 1.5 POWDER, FOR SOLUTION ORAL at 07:04

## 2025-02-27 RX ADMIN — ENOXAPARIN SODIUM 80 MG: 80 INJECTION SUBCUTANEOUS at 09:32

## 2025-02-27 RX ADMIN — LORAZEPAM 1 MG: 2 INJECTION INTRAMUSCULAR; INTRAVENOUS at 13:29

## 2025-02-27 RX ADMIN — METOPROLOL TARTRATE 25 MG: 25 TABLET, FILM COATED ORAL at 09:31

## 2025-02-27 RX ADMIN — Medication 2000 UNITS: at 09:31

## 2025-02-27 RX ADMIN — GLYCOPYRROLATE 0.2 MG: 0.2 INJECTION, SOLUTION INTRAMUSCULAR; INTRAVENOUS at 06:09

## 2025-02-27 RX ADMIN — POLYMYXIN B SULFATE AND TRIMETHOPRIM 1 DROP: 10000; 1 SOLUTION OPHTHALMIC at 06:09

## 2025-02-27 RX ADMIN — MORPHINE SULFATE 5 MG: 10 SOLUTION ORAL at 13:28

## 2025-02-27 RX ADMIN — PIPERACILLIN SODIUM AND TAZOBACTAM SODIUM 3.38 G: 3; .375 INJECTION, SOLUTION INTRAVENOUS at 09:30

## 2025-02-27 RX ADMIN — MORPHINE SULFATE 5 MG: 10 SOLUTION ORAL at 14:30

## 2025-02-27 RX ADMIN — PANTOPRAZOLE SODIUM 40 MG: 40 INJECTION, POWDER, FOR SOLUTION INTRAVENOUS at 07:04

## 2025-02-27 ASSESSMENT — PAIN SCALES - PAIN ASSESSMENT IN ADVANCED DEMENTIA (PAINAD)
CONSOLABILITY: NO NEED TO CONSOLE
FACIALEXPRESSION: SMILING OR INEXPRESSIVE
FACIALEXPRESSION: SMILING OR INEXPRESSIVE
TOTALSCORE: 0
BREATHING: NORMAL
BODYLANGUAGE: RELAXED
BODYLANGUAGE: RELAXED
TOTALSCORE: 0
BREATHING: NORMAL
CONSOLABILITY: NO NEED TO CONSOLE
BREATHING: NORMAL
BODYLANGUAGE: RELAXED
FACIALEXPRESSION: SMILING OR INEXPRESSIVE
TOTALSCORE: 0
CONSOLABILITY: NO NEED TO CONSOLE

## 2025-02-27 ASSESSMENT — PAIN - FUNCTIONAL ASSESSMENT
PAIN_FUNCTIONAL_ASSESSMENT: PAINAD (PAIN ASSESSMENT IN ADVANCED DEMENTIA SCALE)

## 2025-02-27 ASSESSMENT — RESPIRATORY DISTRESS OBSERVATION SCALE (RDOS)
GRUNTING AT END OF EXPIRATION: 0 - NONE
HEART RATE PER MINUTE: 1 - 90-109 BEATS
RESTLESS NONPURPOSEFUL MOVEMENTS: 0 - NONE
RDOS TOTAL SCORE: 11
RESPIRATORY RATE PER MINUTE: 2 - >30 BREATHS
HEART RATE PER MINUTE: 1 - 90-109 BEATS
PARADOXICAL BREATHING PATTERN: 0 - NONE
ACCESSORY MUSCLE RISE IN CLAVICLE DURING INSPIRATION: 1 - SLIGHT RISE
PARADOXICAL BREATHING PATTERN: 2 - PRESENT
LOOK OF FEAR: 0 - NONE
RESTLESS NONPURPOSEFUL MOVEMENTS: 0 - NONE
LOOK OF FEAR: 0 - NONE
INVOLUNTARY NASAL FLARING: 2 - PRESENT
RESPIRATORY RATE PER MINUTE: 2 - >30 BREATHS
INVOLUNTARY NASAL FLARING: 2 - PRESENT
RDOS TOTAL SCORE: 6
ACCESSORY MUSCLE RISE IN CLAVICLE DURING INSPIRATION: 2 - PRONOUNCED RISE
GRUNTING AT END OF EXPIRATION: 2 - PRESENT

## 2025-02-27 ASSESSMENT — COGNITIVE AND FUNCTIONAL STATUS - GENERAL
TOILETING: TOTAL
TURNING FROM BACK TO SIDE WHILE IN FLAT BAD: TOTAL
PERSONAL GROOMING: TOTAL
MOVING FROM LYING ON BACK TO SITTING ON SIDE OF FLAT BED WITH BEDRAILS: TOTAL
DRESSING REGULAR UPPER BODY CLOTHING: TOTAL
MOBILITY SCORE: 6
EATING MEALS: TOTAL
HELP NEEDED FOR BATHING: TOTAL
MOVING TO AND FROM BED TO CHAIR: TOTAL
WALKING IN HOSPITAL ROOM: TOTAL
DAILY ACTIVITIY SCORE: 6
STANDING UP FROM CHAIR USING ARMS: TOTAL
DRESSING REGULAR LOWER BODY CLOTHING: TOTAL
CLIMB 3 TO 5 STEPS WITH RAILING: TOTAL

## 2025-02-27 ASSESSMENT — PAIN SCALES - GENERAL: PAINLEVEL_OUTOF10: 0 - NO PAIN

## 2025-02-27 ASSESSMENT — PAIN SCALES - WONG BAKER: WONGBAKER_NUMERICALRESPONSE: NO HURT

## 2025-02-27 NOTE — CARE PLAN
The patient's goals for the shift include    Problem: Respiratory  Goal: Clear secretions with interventions this shift  Outcome: Not Progressing  Goal: No signs of respiratory distress (eg. Use of accessory muscles. Peds grunting)  Outcome: Not Progressing  Goal: Patent airway maintained this shift  Outcome: Not Progressing  Goal: Tolerate pulmonary toileting this shift  Outcome: Not Progressing     Problem: Safety - Adult  Goal: Free from fall injury  Outcome: Not Progressing     Problem: Chronic Conditions and Co-morbidities  Goal: Patient's chronic conditions and co-morbidity symptoms are monitored and maintained or improved  Outcome: Not Progressing     Problem: Nutrition  Goal: Nutrient intake appropriate for maintaining nutritional needs  Outcome: Not Progressing     Problem: Pain - Adult  Goal: Verbalizes/displays adequate comfort level or baseline comfort level  Outcome: Not Progressing     Problem: Discharge Planning  Goal: Discharge to home or other facility with appropriate resources  Outcome: Not Progressing     Problem: Skin  Goal: Participates in plan/prevention/treatment measures  Outcome: Not Progressing  Goal: Prevent/manage excess moisture  Outcome: Not Progressing  Goal: Prevent/minimize sheer/friction injuries  Outcome: Not Progressing  Goal: Promote/optimize nutrition  Outcome: Not Progressing  Goal: Promote skin healing  Outcome: Not Progressing       The clinical goals for the shift include Pt will remain HDS throughout shift.    Patient code status changed to DNR comfort care. Comfort care measures taken; ativan and morphine administered per orders. Corpak removed. Patient awaiting to continue hospice care at [facility]. Safety maintained.

## 2025-02-27 NOTE — SIGNIFICANT EVENT
Code status:  In consultation with Ethics consult from yesterday, attending physician and consulting physician Dr. Bass have signed DNRCC form and hospice consents were signed by myself and Dr. Bass. Patient will be started on comfort medications and will be transferred back to her LTC facility for further comfort care.    Anastasia Fisher, APRN-CNP

## 2025-02-27 NOTE — PROGRESS NOTES
Speech-Language Pathology                 Therapy Communication Note    Patient Name: Adela Barrett  MRN: [id number]  Department: [facility] [[email or web address] 2  Room: 2105/2105-A  Today's Date: 2/27/2025     Discipline: Speech Language Pathology      Missed Visit Reason:  Medical status, level of alertness    Missed Time: Attempt at 1024    Comment: Patient continues to be minimally responsive, with hospice discharge likely per RN. ST to remain available as needed during inpatient stay.

## 2025-02-27 NOTE — PROGRESS NOTES
02/27/25 0857   Discharge Planning   [facility]   Does the patient need discharge transport arranged? Yes   RoundTrip coordination needed? Yes   Has discharge transport been arranged? No     [facility] will be at the bedside at 9am to get  signatures from two physicians  for hospice    [facility]  [place] updated that ADOD would be today once  plan is confirmed with   JB Jorgensen, ISHAANW          [id number]     2-27  Consents  were signed with Nora    Pt is now  DNRCC    Sw sent message to [facility] to confirm that they are ready to  accept pt  back   being on  6-8  L of oxygen    JB Serrano, ISHAANW

## 2025-02-27 NOTE — PROGRESS NOTES
Pulmonary Consult    Request of Pulmonary Consult to evaluate Adela Barrett  for hypoxia  I have independently interviewed and examined the patient in the office and reviewed available records.    Physician HPI   The patient is still has altered mental status with minimal response  Ethics committee note was reviewed the plan to make the patient DNR CC and consult hospice care patient has a poor prognosis related to respiratory failure congestive heart failure atrial fibrillation dementia acute metabolic encephalopathy   Current Medications:  Current Outpatient Medications   Medication Instructions    acetaminophen (TYLENOL 8 HOUR) 650 mg, Every 6 hours PRN    albuterol 2.5 mg, nebulization, Every 6 hours PRN    alum-mag hydroxide-simeth (Mylanta) 200-200-20 mg/5 mL oral suspension 30 mL, Every 6 hours PRN    aspirin 81 mg, Daily    atorvastatin (LIPITOR) 80 mg, Nightly    baclofen (LIORESAL) 5 mg, 2 times daily    bisacodyl (DULCOLAX) 10 mg, Daily PRN    cholecalciferol (VITAMIN D-3) 50 mcg, Daily    diazePAM (VALIUM) 10 mg, Every 8 hours PRN    furosemide (LASIX) 20 mg, Daily    gabapentin (NEURONTIN) 100 mg, 2 times daily    glimepiride (AMARYL) 4 mg, Daily before breakfast    ipratropium (Atrovent) 42 mcg (0.06 %) nasal spray 2 sprays, Every 6 hours PRN    loperamide (IMODIUM) 2 mg, 4 times daily PRN    lurasidone (LATUDA) 120 mg, Daily with breakfast    magnesium hydroxide (Milk of Magnesia) 400 mg/5 mL suspension 30 mL, Daily PRN    nitroglycerin (NITROSTAT) 0.4 mg, Every 5 min PRN    ondansetron (ZOFRAN) 4 mg, Every 6 hours PRN    pantoprazole (PROTONIX) 40 mg, Daily before breakfast    polyethylene glycol (GLYCOLAX, MIRALAX) 17 g, Daily PRN    potassium chloride CR 10 mEq ER tablet 10 mEq, Daily    SITagliptin phosphate (JANUVIA) 25 mg, Daily    umeclidinium-vilanteroL (Anoro Ellipta) 62.5-25 mcg/actuation blister with device 1 puff, inhalation, Daily    vortioxetine (TRINTELLIX) 20 mg, Daily        Drug  "Allergies/Intolerances:  Allergies   Allergen Reactions    Bee Venom Protein (Honey Bee) Unknown    Carisoprodol Unknown    Diclofenac Unknown    Lamotrigine Unknown    Metformin Unknown    Venom-Wasp Unknown          Physical Examination:  /56   Pulse 108   Temp 36.5 °C (97.7 °F)   Resp (!) 30   Ht 1.651 m (5' 5\")   Wt 83.9 kg (184 lb 15.5 oz)   SpO2 94%   BMI 30.78 kg/m²      General: AO *0, arousable  HEENT: opens eyes, on oxygen therapy  Neck: supple; no lymphadenopathy or thyromegaly.  Chest: distant breath sounds  Cardiac: no gallop or murmur.  Abdomen: soft; non-tender; non-distended; no hepatosplenomegaly.  Extremities: localizes to pain    Chest Radiograph     XR chest 1 view 02/25/2025    Narrative  Interpreted By:  Jefferson Bull,  STUDY:  XR CHEST 1 VIEW;  2/25/2025 10:09 am    INDICATION:  Signs/Symptoms:sob.    COMPARISON:  02/23/2025    ACCESSION NUMBER(S):  [id number]    ORDERING CLINICIAN:  CHIN YANG    FINDINGS:  Increased bibasilar opacities. Cardiomediastinal silhouette  unchanged. Unchanged mild pulmonary vascular congestion. Feeding tube  partially imaged.    Impression  Increased bibasilar atelectasis or infiltrates.    MACRO:  None    Signed by: Jefferson Bull 2/25/2025 10:24 AM  Dictation workstation:   [id number]      Echocardiogram     No results found for this or any previous visit from the past 365 days.       Chest CT Scan     No results found for this or any previous visit from the past 365 days.       Co-morbidities Problem List    Assessment and Plan / Recommendations:  Problem List Items Addressed This Visit       * (Principal) Sepsis with encephalopathy without septic shock, due to unspecified organism (Multi) - Primary    UTI (urinary tract infection)    Altered mental status    Relevant Orders    Carotid duplex bilateral (Completed)     Other Visit Diagnoses       Aneurysm of other specified arteries        Relevant Orders    Carotid duplex bilateral (Completed)    " Other specified symptoms and signs involving the circulatory and respiratory systems        Atrial fibrillation, unspecified type (Multi)        Relevant Medications    nitroglycerin (Nitrostat) 0.4 mg SL tablet    metoprolol tartrate (Lopressor) tablet 25 mg    metoprolol tartrate (Lopressor) injection 5 mg (Completed)    metoprolol tartrate (Lopressor) injection 2.5 mg (Completed)    Other Relevant Orders    Transthoracic Echo (TTE) Complete (Completed)    Atrial tachycardia (CMS-HCC)        Relevant Medications    nitroglycerin (Nitrostat) 0.4 mg SL tablet    metoprolol tartrate (Lopressor) tablet 25 mg    metoprolol tartrate (Lopressor) injection 5 mg (Completed)    metoprolol tartrate (Lopressor) injection 2.5 mg (Completed)    Other Relevant Orders    Transthoracic Echo (TTE) Complete (Completed)        A/P  Metabolic encephalopathy   2ry  to influenza A, UTI, respiratory failure, DIONNE, cardiac arrhythmia  Poor prognosis    Respiratory failure:  continue with oxygen therapy for comfort    Other medical problem  COPD :  CVA  CHF  DIONNE  Afib    Patient is DNR ml/ ethics committee        Sri Bass MD

## 2025-02-27 NOTE — PROGRESS NOTES
"Subjective  Patient continued to be minimally responsive  Nursing staff was interviewed  Objectives    Last Recorded Vitals  Blood pressure 119/60, pulse 108, temperature 36.7 °C (98.1 °F), resp. rate (!) 38, height 1.651 m (5' 5\"), weight 83.5 kg (184 lb 1.4 oz), SpO2 93%.    Physical Exam  HENT:      Right Ear: External ear normal.      Left Ear: External ear normal.      Mouth/Throat:      Mouth: Mucous membranes are moist.   Cardiovascular:      Rate and Rhythm: Normal rate and regular rhythm.      Heart sounds: No murmur heard.     No friction rub. No gallop.   Pulmonary:      Effort: No accessory muscle usage or respiratory distress.      Breath sounds: No stridor. No wheezing or rhonchi.   Chest:      Chest wall: No tenderness.   Abdominal:      General: There is no distension.      Palpations: There is no mass.      Tenderness: There is no abdominal tenderness. There is no guarding or rebound.   Musculoskeletal:         General: No deformity or signs of injury.      Cervical back: No rigidity or tenderness. Normal range of motion.      Right lower leg: No edema.      Left lower leg: No edema.   Skin:     Coloration: Skin is not jaundiced or pale.      Findings: No lesion.   Neurological:      G lethargic minimally responsive       Labs    No results displayed because visit has over 200 results.            Imaging     XR chest 1 view  Narrative: Interpreted By:  Reno Ackerman,   STUDY:  XR CHEST 1 VIEW      INDICATION:  Signs/Symptoms:shallow breathing and concern for fluid overload.      COMPARISON:  February 25      ACCESSION NUMBER(S):  [id number]      ORDERING CLINICIAN:  MOLINA MURPHY      FINDINGS:  Mild patchy basilar airspace opacity right lung base unchanged. No  large effusion or pneumothorax.      Impression: Unchanged appearance right basilar patchy airspace disease which  could be atelectasis, asymmetric edema, or pneumonia.      Signed by: Reno Ackerman 2/27/2025 6:53 AM  Dictation workstation:   " [id number]       Patient Active Problem List   Diagnosis    Sepsis with encephalopathy without septic shock, due to unspecified organism (Multi)    UTI (urinary tract infection)    Influenza A    Acute hypoxic respiratory failure (Multi)    History of COPD    (HFpEF) heart failure with preserved ejection fraction    Pulmonary HTN (Multi)    AS (aortic stenosis)    Altered mental status    Elevated troponin I level         Assessment/Plan   Assessment & Plan  Sepsis with encephalopathy without septic shock, due to unspecified organism (Multi)    UTI (urinary tract infection)    Influenza A    Acute hypoxic respiratory failure (Multi)    History of COPD    (HFpEF) heart failure with preserved ejection fraction    Pulmonary HTN (Multi)    AS (aortic stenosis)    Altered mental status    Elevated troponin I level  Acute respiratory failure related to urinary tract infection possible aspiration pneumonia patient is not doing well continue to require high flow oxygen ethics committee note was reviewed the plan to make the patient DNR CC and consult hospice care patient has a poor prognosis related to respiratory failure congestive heart failure atrial fibrillation dementia acute metabolic encephalopathy           I spent 30 minutes in the professional and overall care of this patient.      CHIN Mercer MD

## 2025-02-27 NOTE — ASSESSMENT & PLAN NOTE
Acute respiratory failure related to urinary tract infection possible aspiration pneumonia patient is not doing well continue to require high flow oxygen ethics committee note was reviewed the plan to make the patient DNR CC and consult hospice care patient has a poor prognosis related to respiratory failure congestive heart failure atrial fibrillation dementia acute metabolic encephalopathy

## 2025-02-27 NOTE — NURSING NOTE
Hospice consents brought to hospital by the [facility] Liaison. Waiting for two signature DNRCC and hospice consents to be completed. Once these are complete, plan to start comfort care and transport back to [facility] where [facility] will take over for hospice services.    DNRCC in place, comfort medications given prior to placing pt on NC. Pt resting comfortably at this time. Transport arranged for 330pm pickup back [facility]

## 2025-02-27 NOTE — PROGRESS NOTES
"   02/27/25 1243   Discharge Planning   Does the patient need discharge transport arranged? Yes   RoundTrip coordination needed? Yes   Has discharge transport been arranged? Yes   What day is the transport expected? 02/27/25   What time is the transport expected? 1530     [facility] will accept back  with [facility]    Team and facility informed  of transport time/ per Roundtrip   \"The BLS Vehicle you requested for Adela S. in unit/room 2105 on 02/27/2025 is scheduled to arrive at 3:30pm EST! [facility] is handling this ride and you can contact them at [phone].\"    JB Serrano, ISHAANW          1500     2-27  BARI  spoke to  manuel   of admissions at    [facility]  as  [employer] messages  went unanswered. She is  aware  of     the pickup time.  She reports that     nurse to nurse is [phone]. pt is returning to the [facility] unit     Team informed      SW spoke to Martell and informed him that Adela  would be going back to   [facility] this evening    JB Serrano, LSW        "

## 2025-02-27 NOTE — PROGRESS NOTES
PHARMACIST CONSULT  RENAL DOSING ADJUSTMENT    Patient Name: Adela Barrett  MRN: [id number]  Admission Date: 2/21/2025 10:58 AM  Date/Time of Consult: 02/27/25 at 9:41 AM    In accordance with the inpatient pharmacy renal dose adjustment consult agreement the following medication changes have been made:  Antimicrobial: currently ordered oseltamivir 30 mg q 12 hr, will be adjusted to oseltamivir 30 mg q 24 hr      Results from last 72 hours   Lab Units 02/27/25  0448 02/26/25  0530 02/25/25  0603   CREATININE mg/dL 2.06* 1.50* 1.11*   BUN mg/dL 47* 42* 39*       Serum creatinine: 2.06 mg/dL (H) 02/27/25 0448  Estimated creatinine clearance: 27.9 mL/min (A)      Per consult agreement, pharmacy will order related labs, evaluate results, and adjust dosing as it pertains to the drug therapy being managed.  Thank you for allowing me to participate in the care for this patient.    Signature: TITUS SMITH  02/27/25 at 9:41 AM

## 2025-02-27 NOTE — PROGRESS NOTES
Subjective Data:  I48.91 Atrial fibrillation converted to sinus rhythm with an IV amiodarone  I42.9 HFrEF EF 35 to 40%-now improved greater than 50%  I26.9 Past history pulmonary embolism        Poorly responsive altered mental status attending inquiring regarding signing DNR hospice orders and returning to facility  Has moderate aortic valve stenosis  COPD HFpEF  Acute respiratory failure  History of influenza  Nonfunctional Corpak still coiling which means we still have her on IV amiodarone until the Corpak is resolved  Does not respond to commands          Recommend changing amiodarone to oral later when able to swallow for 30 days currently nonresponsive unable to swallow and feeding tube not working so will keep on IV drip 1 more day  In future consider Eliquis instead of aspirin but first long-term monitor in discussion with neurology  Continue high-dose statins  Monitor pulmonary status    Prior echo's at [facility] inferolateral hypokinetic scar read by Dr. Horner and Dr. 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   Bipolar affective disorder (HCC)   Calculus of gallbladder without cholecystitis without obstruction 05/11/2023   Carotid artery disease (HCC)   Carpal tunnel syndrome   Cervical spondylosis without myelopathy 05/30/2013   Chemotherapy-induced neuropathy ([facility]) 08/26/2021   CKD3   COPD (chronic obstructive pulmonary disease) ([facility]) 11/25/2022   CVA (cerebral vascular accident) ([facility]) 10/2023   Depression   DM2   Essential hypertension, benign   resolved with weight loss   Generalized anxiety disorder 01/20/2022   History of pulmonary embolism 04/2020   Hyperlipidemia   Mild aortic stenosis 05/2023   Non-small cell lung cancer (HCC) 04/2019   SCC   Obesity, Class II, BMI 35-39.9 11/25/2022   Osteopenia (DXA 2/8/23)   Prolonged QT interval  11/25/2022   Unspecified chronic bronchitis (HCC)   PAST SURGICAL HISTORY[]Expand by Default     PAST SURGICAL HISTORY   Procedure Laterality Date   PAST SURGICAL HISTORY OF Bilateral   breast reduction   PAST SURGICAL HISTORY OF   cervical injections   PAST SURGICAL HISTORY OF 01/15/2015   left carpal tunnel release   REDUCTION OF LARGE BREAST   Breast reduction   TONSILLECTOMY & ADENOIDECTOMY <AGE 13   Tonsil/adenoidectomy   XCAPSL CTRC RMVL INSJ IO LENS PROSTH W/O ECP Right 09/24/2020   Cataract Extraction with PC IOL   XCAPSL CTRC RMVL INSJ IO LENS PROSTH W/O ECP Left   Cataract Extraction with PC IOL       FAMILY HISTORY[]Expand by Default   FAMILY HISTORY   Adopted: Yes       SOCIAL HISTORY[]Expand by Default   Social History       Overnight Events:    CODE STATUS hospice orders ethics consultation being discussed  Once Corpak resolved changed to oral amiodarone or even consider stopping the therapy if she is for hospice this determination hopefully to be made this morning     Objective Data:  Last Recorded Vitals:  Vitals:    02/27/25 0000 02/27/25 0125 02/27/25 0340 02/27/25 0400   BP: 100/54  119/60    BP Location:       Patient Position:       Pulse: 100  102 106   Resp: (!) 36  (!) 33 (!) 38   Temp: 36.6 °C (97.9 °F)  36.7 °C (98.1 °F)    TempSrc:       SpO2: 96% 93% 95% 95%   Weight:   83.5 kg (184 lb 1.4 oz)    Height:           Last Labs:  CBC - 2/27/2025:  4:48 AM  9.9 11.2 252    37.7      CMP - 2/27/2025:  4:48 AM  9.5 6.6 18 --- 0.5   _ 3.1 18 73      PTT - No results in last year.  _   _ _     TROPHS   Date/Time Value Ref Range Status   02/24/2025 11:36 AM 64 0 - 13 ng/L Final   02/22/2025 05:06 PM 39 0 - 13 ng/L Final   02/22/2025 05:10 AM 38 0 - 13 ng/L Final     BNP   Date/Time Value Ref Range Status   02/27/2025 04:48  0 - 99 pg/mL Final   02/21/2025 11:17  0 - 99 pg/mL Final     HGBA1C   Date/Time Value Ref Range Status   04/30/2024 08:41 AM 6.4 4.3 - 5.6 % Final     Comment:      American Diabetes Association guidelines indicate that patients with HgbA1c in the range 5.7-6.4% are at increased risk for development of diabetes, and intervention by lifestyle modification may be beneficial. HgbA1c greater or equal to 6.5% is considered diagnostic of diabetes.   11/08/2023 08:05 AM 7.6 4.3 - 5.6 % Final     Comment:     American Diabetes Association guidelines indicate that patients with HgbA1c in the range 5.7-6.4% are at increased risk for development of diabetes, and intervention by lifestyle modification may be beneficial. HgbA1c greater or equal to 6.5% is considered diagnostic of diabetes.      Last I/O:  I/O last 3 completed shifts:  In: 2477.3 (29.7 mL/kg) [I.V.:2082.3 (24.9 mL/kg); NG/GT:345; IV Piggyback:50]  Out: 700 (8.4 mL/kg) [Urine:700 (0.2 mL/kg/hr)]  Weight: 83.5 kg     Past Cardiology Tests (Last 3 Years):  EKG:  Electrocardiogram, 12-lead PRN ACS symptoms 02/24/2025 (Preliminary)      ECG 12 Lead 02/23/2025 (Preliminary)      ECG 12 Lead 02/23/2025 (Preliminary)      Electrocardiogram, 12-lead PRN ACS symptoms 02/21/2025 (Preliminary)    Echo:  Transthoracic Echo (TTE) Complete 02/24/2025    Ejection Fractions:  EF   Date/Time Value Ref Range Status   02/24/2025 02:53 PM 53 %      Cath:  No results found for this or any previous visit from the past 1095 days.    Stress Test:  No results found for this or any previous visit from the past 1095 days.    Cardiac Imaging:  No results found for this or any previous visit from the past 1095 days.      Inpatient Medications:  Scheduled medications   Medication Dose Route Frequency    [Held by provider] aspirin  81 mg oral Daily    aspirin  300 mg rectal Daily    atorvastatin  80 mg oral Nightly    cholecalciferol  2,000 Units oral Daily    enoxaparin  1 mg/kg subcutaneous q12h    [Held by provider] esomeprazole  40 mg nasoduodenal tube Daily before breakfast    furosemide  20 mg oral Daily    insulin lispro  0-10 Units subcutaneous q4h     ipratropium-albuteroL  3 mL nebulization TID    lidocaine  1 Application Topical Once    lubricating eye drops  1 drop Both Eyes BID    [Held by provider] lurasidone  120 mg oral Daily with breakfast    metoprolol tartrate  25 mg oral BID    nystatin  5 mL Swish & Swallow 4x daily    oseltamivir  30 mg oral BID    oxygen   inhalation Continuous - Inhalation    pantoprazole  40 mg intravenous Daily before breakfast    piperacillin-tazobactam  3.375 g intravenous q8h    polymyxin B sulf-trimethoprim  1 drop Both Eyes q4h DEMOND    potassium chloride CR  10 mEq oral Daily    [Held by provider] vortioxetine  20 mg oral Daily     PRN medications   Medication    acetaminophen    Or    acetaminophen    albuterol    alum-mag hydroxide-simeth    bisacodyl    dextrose    dextrose    [Held by provider] diazePAM    glucagon    glucagon    glycopyrrolate    [Held by provider] ipratropium    magnesium hydroxide    melatonin    ondansetron ODT    Or    ondansetron    oxygen    oxygen    polyethylene glycol     Continuous Medications   Medication Dose Last Rate    amiodarone  0.5 mg/min 0.5 mg/min (02/27/25 0621)     Results for orders placed or performed during the hospital encounter of 02/21/25 (from the past 24 hours)   POCT GLUCOSE   Result Value Ref Range    POCT Glucose 183 (H) 74 - 99 mg/dL   POCT GLUCOSE   Result Value Ref Range    POCT Glucose 226 (H) 74 - 99 mg/dL   POCT GLUCOSE   Result Value Ref Range    POCT Glucose 212 (H) 74 - 99 mg/dL   POCT GLUCOSE   Result Value Ref Range    POCT Glucose 276 (H) 74 - 99 mg/dL   POCT GLUCOSE   Result Value Ref Range    POCT Glucose 265 (H) 74 - 99 mg/dL   CBC   Result Value Ref Range    WBC 9.9 4.4 - 11.3 x10*3/uL    nRBC 0.2 (H) 0.0 - 0.0 /100 WBCs    RBC 4.30 4.00 - 5.20 x10*6/uL    Hemoglobin 11.2 (L) 12.0 - 16.0 g/dL    Hematocrit 37.7 36.0 - 46.0 %    MCV 88 80 - 100 fL    MCH 26.0 26.0 - 34.0 pg    MCHC 29.7 (L) 32.0 - 36.0 g/dL    RDW 18.8 (H) 11.5 - 14.5 %    Platelets 252  150 - 450 x10*3/uL   Basic Metabolic Panel   Result Value Ref Range    Glucose 294 (H) 74 - 99 mg/dL    Sodium 144 136 - 145 mmol/L    Potassium 3.1 (L) 3.5 - 5.3 mmol/L    Chloride 105 98 - 107 mmol/L    Bicarbonate 25 21 - 32 mmol/L    Anion Gap 17 10 - 20 mmol/L    Urea Nitrogen 47 (H) 6 - 23 mg/dL    Creatinine 2.06 (H) 0.50 - 1.05 mg/dL    eGFR 26 (L) >60 mL/min/1.73m*2    Calcium 9.5 8.6 - 10.3 mg/dL   Magnesium   Result Value Ref Range    Magnesium 2.04 1.60 - 2.40 mg/dL   B-Type Natriuretic Peptide   Result Value Ref Range     (H) 0 - 99 pg/mL        Physical Exam:  Subjective:   Examination:   General Examination:   General Appearance: Obese feeding tube poorly responsive unable to swallow head: normocephalic, atraumatic   Eyes: Anicteric sclera. Pupils are equally round and reactive to light.  Extraocular movements are intact.    Ears: normal   Oral: Cavity: mucosa moist.   Throat: clear.   Neck/Thyroid: neck supple, full range of motion, no cervical lymphadenopathy.   Skin: warm and dry, no suspicious lesions.    Heart: regular rate and rhythm, S1, S2 normal, no murmurs.   Lungs: clear to auscultation bilaterally.   Abdomen: soft, non-tender, non-distended, bowl sound present, normal.   Extremities: no clubbing, no cyanosis, no edema.   Neuro: non-focal, motor strength normal upper lower extremities, sensory exam intact.       Assessment/Plan   I48.91 Atrial fibrillation converted to sinus rhythm with an IV amiodarone  I42.9 HFrEF EF 35 to 40%-now improved greater than 50%  I26.9 Past history pulmonary embolism      Code Status:  Full Code    I spent 40 minutes in the professional and overall care of this patient.        Jose Taylor MD

## 2025-02-27 NOTE — CARE PLAN
Overnight HR low 100s. RR 30s. Sp02>92 on HFNC 40L 50% Fio2. Pt responds to pain, doesn't follow commands, occasionally opens eyes. Pt breathing shallow, unable to clear secretions on her own. Oral suctioning not helping. CXR and BNP ordered. IVF discontinued. Pt seen by Genesis Archer. Lazaro ordered. Tube feed running at 25ml/hr. No residual from corpak.

## 2025-02-28 NOTE — DISCHARGE SUMMARY
Discharge Diagnosis  Sepsis with encephalopathy without septic shock, due to unspecified organism (Multi)    Issues Requiring Follow-Up  Discharged to hospice    Discharge Meds     Medication List      START taking these medications     acetaminophen 650 mg suppository; Commonly known as: Tylenol; Insert 1   suppository (650 mg) into the rectum every 4 hours if needed (fever >38.0   C).; Replaces: acetaminophen 650 mg ER tablet   hyoscyamine 0.125 mg disintegrating tablet; Commonly known as: Anaspaz;   Place 1 tablet (0.125 mg) under the tongue every 4 hours if needed (excess   secretions).   LORazepam 0.5 mg tablet; Commonly known as: Ativan; Take 1 tablet (0.5   mg) by mouth every 4 hours if needed (anxiety, restlessness, insomnia).   Sublingual   lubricating eye drops ophthalmic solution; Administer 1 drop into both   eyes 2 times a day.   morphine 10 mg/0.5 mL concentrated oral solution; Place 0.3 mL (6 mg)   under the tongue every 1 hour if needed for shortness of breath,   discomfort, moderate pain (4 - 6) or severe pain (7 - 10).   * OLANZapine 2.5 mg tablet; Commonly known as: ZyPREXA; Take 1 tablet   (2.5 mg) by mouth every 6 hours if needed (nausea, vomiting).   * OLANZapine zydis 5 mg disintegrating tablet; Commonly known as:   ZyPREXA; Dissolve 1 tablet (5 mg) in the mouth every 6 hours if needed   (agitation, delirium).   oxygen gas therapy; Commonly known as: O2; Inhale 1 each once every 24   hours.  * This list has 2 medication(s) that are the same as other medications   prescribed for you. Read the directions carefully, and ask your doctor or   other care provider to review them with you.     CONTINUE taking these medications     albuterol 2.5 mg /3 mL (0.083 %) nebulizer solution   bisacodyl 10 mg suppository; Commonly known as: Dulcolax   ondansetron 4 mg tablet; Commonly known as: Zofran     STOP taking these medications     acetaminophen 650 mg ER tablet; Commonly known as: Tylenol 8 HOUR;    Replaced by: acetaminophen 650 mg suppository   alum-mag hydroxide-simeth 200-200-20 mg/5 mL oral suspension; Commonly   known as: Mylanta   Anoro Ellipta 62.5-25 mcg/actuation blister with device; Generic drug:   umeclidinium-vilanteroL   aspirin 81 mg EC tablet   atorvastatin 80 mg tablet; Commonly known as: Lipitor   baclofen 5 mg tablet; Commonly known as: Lioresal   cholecalciferol 50 MCG (2000 UT) tablet; Commonly known as: Vitamin D-3   diazePAM 10 mg tablet; Commonly known as: Valium   furosemide 20 mg tablet; Commonly known as: Lasix   gabapentin 100 mg capsule; Commonly known as: Neurontin   glimepiride 4 mg tablet; Commonly known as: Amaryl   ipratropium 42 mcg (0.06 %) nasal spray; Commonly known as: Atrovent   loperamide 2 mg capsule; Commonly known as: Imodium   lurasidone 120 mg tablet; Commonly known as: Latuda   magnesium hydroxide 400 mg/5 mL suspension; Commonly known as: Milk of   Magnesia   nitroglycerin 0.4 mg SL tablet; Commonly known as: Nitrostat   pantoprazole 40 mg EC tablet; Commonly known as: ProtoNix   polyethylene glycol 17 gram packet; Commonly known as: Glycolax, Miralax   potassium chloride CR 10 mEq ER tablet; Commonly known as: Klor-Con   SITagliptin phosphate 25 mg tablet; Commonly known as: Januvia   Trintellix 20 mg tablet tablet; Generic drug: vortioxetine       Test Results Pending At Discharge  Pending Labs       No current pending labs.            Hospital Course   Was admitted to the hospital secondary to respiratory failure she was diagnosed with influenza A and urinary tract infection patient went into atrial fibrillation with rapid ventricular rate patient had no family had a history of stroke left-sided weakness patient has poor prognosis and poor quality of life ethics committee was consulted and the plan to make the patient DNR and admit her to hospice care    Pertinent Physical Exam At Time of Discharge  Physical Exam    Outpatient Follow-Up  No future  appointments.      CHIN Mercer MD

## 2025-03-06 LAB
ATRIAL RATE: 114 BPM
ATRIAL RATE: 127 BPM
P AXIS: 81 DEGREES
P AXIS: 95 DEGREES
P OFFSET: 188 MS
P OFFSET: 194 MS
P ONSET: 141 MS
P ONSET: 141 MS
PR INTERVAL: 148 MS
PR INTERVAL: 152 MS
Q ONSET: 215 MS
Q ONSET: 217 MS
QRS COUNT: 18 BEATS
QRS COUNT: 21 BEATS
QRS DURATION: 74 MS
QRS DURATION: 84 MS
QT INTERVAL: 302 MS
QT INTERVAL: 326 MS
QTC CALCULATION(BAZETT): 438 MS
QTC CALCULATION(BAZETT): 449 MS
QTC FREDERICIA: 388 MS
QTC FREDERICIA: 403 MS
R AXIS: -56 DEGREES
R AXIS: -57 DEGREES
T AXIS: 73 DEGREES
T AXIS: 96 DEGREES
T OFFSET: 368 MS
T OFFSET: 378 MS
VENTRICULAR RATE: 114 BPM
VENTRICULAR RATE: 127 BPM

## 2025-03-10 LAB
ATRIAL RATE: 166 BPM
Q ONSET: 217 MS
QRS COUNT: 25 BEATS
QRS DURATION: 82 MS
QT INTERVAL: 306 MS
QTC CALCULATION(BAZETT): 481 MS
QTC FREDERICIA: 414 MS
R AXIS: -58 DEGREES
T AXIS: 90 DEGREES
T OFFSET: 370 MS
VENTRICULAR RATE: 149 BPM

## 2025-03-25 LAB
ATRIAL RATE: 110 BPM
P AXIS: 67 DEGREES
P OFFSET: 196 MS
P ONSET: 141 MS
PR INTERVAL: 152 MS
Q ONSET: 217 MS
QRS COUNT: 19 BEATS
QRS DURATION: 74 MS
QT INTERVAL: 342 MS
QTC CALCULATION(BAZETT): 462 MS
QTC FREDERICIA: 418 MS
R AXIS: -56 DEGREES
T AXIS: 54 DEGREES
T OFFSET: 388 MS
VENTRICULAR RATE: 110 BPM